# Patient Record
Sex: MALE | Race: BLACK OR AFRICAN AMERICAN | Employment: OTHER | URBAN - METROPOLITAN AREA
[De-identification: names, ages, dates, MRNs, and addresses within clinical notes are randomized per-mention and may not be internally consistent; named-entity substitution may affect disease eponyms.]

---

## 2017-03-01 ENCOUNTER — APPOINTMENT (OUTPATIENT)
Dept: CT IMAGING | Age: 82
DRG: 189 | End: 2017-03-01
Attending: EMERGENCY MEDICINE
Payer: MEDICARE

## 2017-03-01 ENCOUNTER — APPOINTMENT (OUTPATIENT)
Dept: GENERAL RADIOLOGY | Age: 82
DRG: 189 | End: 2017-03-01
Attending: EMERGENCY MEDICINE
Payer: MEDICARE

## 2017-03-01 ENCOUNTER — HOSPITAL ENCOUNTER (INPATIENT)
Age: 82
LOS: 7 days | Discharge: NURSING, REHAB OR LONG-TERM CARE | DRG: 189 | End: 2017-03-08
Attending: EMERGENCY MEDICINE | Admitting: INTERNAL MEDICINE
Payer: MEDICARE

## 2017-03-01 DIAGNOSIS — J96.01 ACUTE RESPIRATORY FAILURE WITH HYPOXIA (HCC): Primary | ICD-10-CM

## 2017-03-01 DIAGNOSIS — J44.9 CHRONIC OBSTRUCTIVE PULMONARY DISEASE, UNSPECIFIED COPD TYPE (HCC): ICD-10-CM

## 2017-03-01 DIAGNOSIS — J44.1 ACUTE EXACERBATION OF CHRONIC OBSTRUCTIVE PULMONARY DISEASE (COPD) (HCC): ICD-10-CM

## 2017-03-01 DIAGNOSIS — C34.12 MALIGNANT NEOPLASM OF UPPER LOBE OF LEFT LUNG (HCC): ICD-10-CM

## 2017-03-01 DIAGNOSIS — J18.9 COMMUNITY ACQUIRED PNEUMONIA: ICD-10-CM

## 2017-03-01 DIAGNOSIS — R06.02 SHORTNESS OF BREATH: ICD-10-CM

## 2017-03-01 PROBLEM — J96.91 RESPIRATORY FAILURE WITH HYPOXIA (HCC): Status: ACTIVE | Noted: 2017-03-01

## 2017-03-01 LAB
ALBUMIN SERPL BCP-MCNC: 3.1 G/DL (ref 3.4–5)
ALBUMIN/GLOB SERPL: 0.7 {RATIO} (ref 0.8–1.7)
ALP SERPL-CCNC: 72 U/L (ref 45–117)
ALT SERPL-CCNC: 62 U/L (ref 16–61)
ANION GAP BLD CALC-SCNC: 6 MMOL/L (ref 3–18)
APTT PPP: 29 SEC (ref 23–36.4)
ARTERIAL PATENCY WRIST A: YES
AST SERPL W P-5'-P-CCNC: 53 U/L (ref 15–37)
BASE EXCESS BLD CALC-SCNC: 11 MMOL/L
BASOPHILS # BLD AUTO: 0 K/UL (ref 0–0.06)
BASOPHILS # BLD: 0 % (ref 0–2)
BDY SITE: ABNORMAL
BILIRUB DIRECT SERPL-MCNC: 0.2 MG/DL (ref 0–0.2)
BILIRUB SERPL-MCNC: 0.6 MG/DL (ref 0.2–1)
BNP SERPL-MCNC: 235 PG/ML (ref 0–1800)
BUN SERPL-MCNC: 14 MG/DL (ref 7–18)
BUN/CREAT SERPL: 19 (ref 12–20)
CALCIUM SERPL-MCNC: 9.3 MG/DL (ref 8.5–10.1)
CHLORIDE SERPL-SCNC: 94 MMOL/L (ref 100–108)
CK MB CFR SERPL CALC: 1.1 % (ref 0–4)
CK MB SERPL-MCNC: 3.1 NG/ML (ref 5–25)
CK SERPL-CCNC: 287 U/L (ref 39–308)
CO2 SERPL-SCNC: 35 MMOL/L (ref 21–32)
CREAT SERPL-MCNC: 0.75 MG/DL (ref 0.6–1.3)
DIFFERENTIAL METHOD BLD: ABNORMAL
EOSINOPHIL # BLD: 0 K/UL (ref 0–0.4)
EOSINOPHIL NFR BLD: 0 % (ref 0–5)
ERYTHROCYTE [DISTWIDTH] IN BLOOD BY AUTOMATED COUNT: 14.1 % (ref 11.6–14.5)
GAS FLOW.O2 O2 DELIVERY SYS: ABNORMAL L/MIN
GLOBULIN SER CALC-MCNC: 4.4 G/DL (ref 2–4)
GLUCOSE SERPL-MCNC: 130 MG/DL (ref 74–99)
HCO3 BLD-SCNC: 35.6 MMOL/L (ref 22–26)
HCT VFR BLD AUTO: 40.4 % (ref 36–48)
HGB BLD-MCNC: 13.2 G/DL (ref 13–16)
INR PPP: 1 (ref 0.8–1.2)
LACTATE BLD-SCNC: 1.1 MMOL/L (ref 0.4–2)
LYMPHOCYTES # BLD AUTO: 5 % (ref 21–52)
LYMPHOCYTES # BLD: 0.6 K/UL (ref 0.9–3.6)
MAGNESIUM SERPL-MCNC: 2.6 MG/DL (ref 1.8–2.4)
MCH RBC QN AUTO: 26.6 PG (ref 24–34)
MCHC RBC AUTO-ENTMCNC: 32.7 G/DL (ref 31–37)
MCV RBC AUTO: 81.5 FL (ref 74–97)
MONOCYTES # BLD: 1.4 K/UL (ref 0.05–1.2)
MONOCYTES NFR BLD AUTO: 11 % (ref 3–10)
NEUTS SEG # BLD: 10.3 K/UL (ref 1.8–8)
NEUTS SEG NFR BLD AUTO: 84 % (ref 40–73)
O2/TOTAL GAS SETTING VFR VENT: 40 %
PCO2 BLD: 54.4 MMHG (ref 35–45)
PEEP RESPIRATORY: 6 CMH2O
PH BLD: 7.42 [PH] (ref 7.35–7.45)
PHOSPHATE SERPL-MCNC: 2.5 MG/DL (ref 2.5–4.9)
PIP ISTAT,IPIP: 13
PLATELET # BLD AUTO: 245 K/UL (ref 135–420)
PMV BLD AUTO: 9.6 FL (ref 9.2–11.8)
PO2 BLD: 76 MMHG (ref 80–100)
POTASSIUM SERPL-SCNC: 3.7 MMOL/L (ref 3.5–5.5)
PROT SERPL-MCNC: 7.5 G/DL (ref 6.4–8.2)
PROTHROMBIN TIME: 12.7 SEC (ref 11.5–15.2)
RBC # BLD AUTO: 4.96 M/UL (ref 4.7–5.5)
SAO2 % BLD: 95 % (ref 92–97)
SERVICE CMNT-IMP: ABNORMAL
SODIUM SERPL-SCNC: 135 MMOL/L (ref 136–145)
SPECIMEN TYPE: ABNORMAL
SPONTANEOUS TIMED, IST: YES
TOTAL RESP. RATE, ITRR: 32
TROPONIN I SERPL-MCNC: <0.02 NG/ML (ref 0–0.04)
WBC # BLD AUTO: 12.3 K/UL (ref 4.6–13.2)

## 2017-03-01 PROCEDURE — 99285 EMERGENCY DEPT VISIT HI MDM: CPT

## 2017-03-01 PROCEDURE — 87186 SC STD MICRODIL/AGAR DIL: CPT | Performed by: EMERGENCY MEDICINE

## 2017-03-01 PROCEDURE — 65660000001 HC RM ICU INTERMED STEPDOWN

## 2017-03-01 PROCEDURE — 71010 XR CHEST PORT: CPT

## 2017-03-01 PROCEDURE — 74011250636 HC RX REV CODE- 250/636: Performed by: PHYSICIAN ASSISTANT

## 2017-03-01 PROCEDURE — 74011250637 HC RX REV CODE- 250/637: Performed by: PHYSICIAN ASSISTANT

## 2017-03-01 PROCEDURE — 85610 PROTHROMBIN TIME: CPT | Performed by: EMERGENCY MEDICINE

## 2017-03-01 PROCEDURE — 80076 HEPATIC FUNCTION PANEL: CPT | Performed by: EMERGENCY MEDICINE

## 2017-03-01 PROCEDURE — 82803 BLOOD GASES ANY COMBINATION: CPT

## 2017-03-01 PROCEDURE — 96365 THER/PROPH/DIAG IV INF INIT: CPT

## 2017-03-01 PROCEDURE — 74011636320 HC RX REV CODE- 636/320: Performed by: EMERGENCY MEDICINE

## 2017-03-01 PROCEDURE — 74011250636 HC RX REV CODE- 250/636: Performed by: EMERGENCY MEDICINE

## 2017-03-01 PROCEDURE — 94660 CPAP INITIATION&MGMT: CPT

## 2017-03-01 PROCEDURE — 83880 ASSAY OF NATRIURETIC PEPTIDE: CPT | Performed by: EMERGENCY MEDICINE

## 2017-03-01 PROCEDURE — 80048 BASIC METABOLIC PNL TOTAL CA: CPT | Performed by: EMERGENCY MEDICINE

## 2017-03-01 PROCEDURE — 74011000250 HC RX REV CODE- 250: Performed by: EMERGENCY MEDICINE

## 2017-03-01 PROCEDURE — 82550 ASSAY OF CK (CPK): CPT | Performed by: EMERGENCY MEDICINE

## 2017-03-01 PROCEDURE — 94762 N-INVAS EAR/PLS OXIMTRY CONT: CPT

## 2017-03-01 PROCEDURE — 87040 BLOOD CULTURE FOR BACTERIA: CPT | Performed by: EMERGENCY MEDICINE

## 2017-03-01 PROCEDURE — 94640 AIRWAY INHALATION TREATMENT: CPT

## 2017-03-01 PROCEDURE — 36600 WITHDRAWAL OF ARTERIAL BLOOD: CPT

## 2017-03-01 PROCEDURE — 71275 CT ANGIOGRAPHY CHEST: CPT

## 2017-03-01 PROCEDURE — 93005 ELECTROCARDIOGRAM TRACING: CPT

## 2017-03-01 PROCEDURE — 85025 COMPLETE CBC W/AUTO DIFF WBC: CPT | Performed by: EMERGENCY MEDICINE

## 2017-03-01 PROCEDURE — 83605 ASSAY OF LACTIC ACID: CPT

## 2017-03-01 PROCEDURE — 74011000258 HC RX REV CODE- 258: Performed by: EMERGENCY MEDICINE

## 2017-03-01 PROCEDURE — 74011250636 HC RX REV CODE- 250/636: Performed by: FAMILY MEDICINE

## 2017-03-01 PROCEDURE — 83735 ASSAY OF MAGNESIUM: CPT | Performed by: EMERGENCY MEDICINE

## 2017-03-01 PROCEDURE — 85730 THROMBOPLASTIN TIME PARTIAL: CPT | Performed by: EMERGENCY MEDICINE

## 2017-03-01 PROCEDURE — 87077 CULTURE AEROBIC IDENTIFY: CPT | Performed by: EMERGENCY MEDICINE

## 2017-03-01 PROCEDURE — 84100 ASSAY OF PHOSPHORUS: CPT | Performed by: EMERGENCY MEDICINE

## 2017-03-01 PROCEDURE — 74011000250 HC RX REV CODE- 250: Performed by: PHYSICIAN ASSISTANT

## 2017-03-01 RX ORDER — MAGNESIUM SULFATE HEPTAHYDRATE 40 MG/ML
2 INJECTION, SOLUTION INTRAVENOUS ONCE
Status: COMPLETED | OUTPATIENT
Start: 2017-03-01 | End: 2017-03-01

## 2017-03-01 RX ORDER — IPRATROPIUM BROMIDE AND ALBUTEROL SULFATE 2.5; .5 MG/3ML; MG/3ML
3 SOLUTION RESPIRATORY (INHALATION) ONCE
Status: COMPLETED | OUTPATIENT
Start: 2017-03-01 | End: 2017-03-01

## 2017-03-01 RX ORDER — SODIUM CHLORIDE 9 MG/ML
100 INJECTION, SOLUTION INTRAVENOUS
Status: COMPLETED | OUTPATIENT
Start: 2017-03-01 | End: 2017-03-01

## 2017-03-01 RX ORDER — IPRATROPIUM BROMIDE AND ALBUTEROL SULFATE 2.5; .5 MG/3ML; MG/3ML
3 SOLUTION RESPIRATORY (INHALATION)
Status: DISCONTINUED | OUTPATIENT
Start: 2017-03-01 | End: 2017-03-08 | Stop reason: HOSPADM

## 2017-03-01 RX ORDER — ASPIRIN 81 MG/1
81 TABLET ORAL DAILY
Status: DISCONTINUED | OUTPATIENT
Start: 2017-03-02 | End: 2017-03-08 | Stop reason: HOSPADM

## 2017-03-01 RX ORDER — ENOXAPARIN SODIUM 100 MG/ML
40 INJECTION SUBCUTANEOUS EVERY 24 HOURS
Status: DISCONTINUED | OUTPATIENT
Start: 2017-03-01 | End: 2017-03-08 | Stop reason: HOSPADM

## 2017-03-01 RX ORDER — ACETAMINOPHEN 325 MG/1
650 TABLET ORAL
Status: DISCONTINUED | OUTPATIENT
Start: 2017-03-01 | End: 2017-03-08 | Stop reason: HOSPADM

## 2017-03-01 RX ORDER — ONDANSETRON 2 MG/ML
4 INJECTION INTRAMUSCULAR; INTRAVENOUS
Status: DISCONTINUED | OUTPATIENT
Start: 2017-03-01 | End: 2017-03-08 | Stop reason: HOSPADM

## 2017-03-01 RX ORDER — SODIUM CHLORIDE 0.9 % (FLUSH) 0.9 %
5-10 SYRINGE (ML) INJECTION EVERY 8 HOURS
Status: DISCONTINUED | OUTPATIENT
Start: 2017-03-01 | End: 2017-03-08 | Stop reason: HOSPADM

## 2017-03-01 RX ORDER — SODIUM CHLORIDE 0.9 % (FLUSH) 0.9 %
5-10 SYRINGE (ML) INJECTION AS NEEDED
Status: DISCONTINUED | OUTPATIENT
Start: 2017-03-01 | End: 2017-03-08 | Stop reason: HOSPADM

## 2017-03-01 RX ORDER — ALBUTEROL SULFATE 0.83 MG/ML
2.5 SOLUTION RESPIRATORY (INHALATION)
Status: DISPENSED | OUTPATIENT
Start: 2017-03-01 | End: 2017-03-01

## 2017-03-01 RX ORDER — LEVOFLOXACIN 5 MG/ML
750 INJECTION, SOLUTION INTRAVENOUS EVERY 24 HOURS
Status: DISCONTINUED | OUTPATIENT
Start: 2017-03-01 | End: 2017-03-04 | Stop reason: CLARIF

## 2017-03-01 RX ADMIN — IOPAMIDOL 71 ML: 755 INJECTION, SOLUTION INTRAVENOUS at 16:35

## 2017-03-01 RX ADMIN — ALBUTEROL SULFATE 2.5 MG: 2.5 SOLUTION RESPIRATORY (INHALATION) at 13:49

## 2017-03-01 RX ADMIN — IPRATROPIUM BROMIDE AND ALBUTEROL SULFATE 3 ML: .5; 3 SOLUTION RESPIRATORY (INHALATION) at 13:56

## 2017-03-01 RX ADMIN — Medication 10 ML: at 21:48

## 2017-03-01 RX ADMIN — ENOXAPARIN SODIUM 40 MG: 40 INJECTION SUBCUTANEOUS at 21:48

## 2017-03-01 RX ADMIN — MAGNESIUM SULFATE HEPTAHYDRATE 2 G: 40 INJECTION, SOLUTION INTRAVENOUS at 13:59

## 2017-03-01 RX ADMIN — IPRATROPIUM BROMIDE AND ALBUTEROL SULFATE 3 ML: .5; 3 SOLUTION RESPIRATORY (INHALATION) at 20:52

## 2017-03-01 RX ADMIN — ACETAMINOPHEN 650 MG: 325 TABLET, FILM COATED ORAL at 22:38

## 2017-03-01 RX ADMIN — METHYLPREDNISOLONE SODIUM SUCCINATE 40 MG: 125 INJECTION, POWDER, FOR SOLUTION INTRAMUSCULAR; INTRAVENOUS at 21:46

## 2017-03-01 RX ADMIN — SODIUM CHLORIDE 100 ML: 900 INJECTION, SOLUTION INTRAVENOUS at 16:35

## 2017-03-01 RX ADMIN — LEVOFLOXACIN 750 MG: 5 INJECTION, SOLUTION INTRAVENOUS at 17:25

## 2017-03-01 NOTE — H&P
[facility]as [facility]man 1946 [facility]  Hospitalist Division      History & Physical    Patient: Tyson Angeles MRN: [id number]  [facility]: [id number]    YOB: 1935  Age: 81 y.o. Sex: male    DOA: 3/1/2017 LOS:  LOS: 0 days        DOA:  3/1/2017  PCP:  Rico Barragan MD    Chief Complaint:  Respiratory distress    Assessment/ Plan:     Patient Active Problem List   Diagnosis Code    Lung mass R91.8    Renal mass N28.89    PAD (peripheral artery disease) (HCC) I73.9    Pulmonary nodules R91.8    CAD (coronary artery disease) I25.10    HTN (hypertension) I10    Hyperlipidemia E78.5    BPH (benign prostatic hypertrophy) N40.0    Respiratory failure with hypoxia (HCC) J96.91       A/P:  - Acute on chronic hypoxic respiratory failure with respiratory distress - Continue BIPAP. CTA Chest pending  - Acute COPD exacerbation - Continue Solumedrol/ Duo-Nebs. - Lovenox for DVT Prophylaxis  - Code status will need re-addressed. He told the ER MD he did not want to be intubated. He told me that he may want intubation if he had no other option. HPI:     Tyson Angeles is a 81 y.o. male with a hx of COPD, Chronic hypoxic respiratory failure on Home O2 3L NC, HTN, BPH, ? Remote history of lung CA who was admitted to [facility] on 3/1/17 for acute hypoxic respiratory failure and COPD exacerbation. He complains of getting short of breath about 1pm this afternoon after walking across a parking lot. He was in distress upon EMS's and was tripoding in the parking lot. He denies any chest pain. He also notes non-productive cough. He declines to answer any other questions for me while on BIPAP. He does not known his home medications and declines to discuss them. He is more comfortable on BIPAP in the ER. He will be admitted to [facility] for further evaluation and treatment.        Past Medical History:   Diagnosis Date    Arthritis     Arthropathy, unspecified, site unspecified     Asthma     BPH (benign prostatic hyperplasia)     COPD (chronic obstructive pulmonary disease) (HCC)     Hypertension     Microscopic hematuria        Past Surgical History:   Procedure Laterality Date    HX AAA REPAIR      HX COLONOSCOPY  2002    HX HERNIA REPAIR  2/6/2004    HX OTHER SURGICAL      Biopsy Prostate    HX OTHER SURGICAL  1/10/2008    HX VASCULAR ACCESS         Family History   Problem Relation Age of Onset    Asthma Father        Social History     Social History    Marital status: LEGALLY      Spouse name: N/A    Number of children: N/A    Years of education: N/A     Social History Main Topics    Smoking status: Former Smoker     Packs/day: 0.30     Years: 55.00     Types: Cigarettes     Quit date: 1/1/2004    Smokeless tobacco: Not on file    Alcohol use No    Drug use: No    Sexual activity: Not Currently     Other Topics Concern    Not on file     Social History Narrative    No narrative on file       No Known Allergies    Review of Systems:  - fever, - chills, - fatigue, - weight loss, - night sweats   - sore throat, - sinus congestion, - lymphadenopathy, - vision changes  - CP, -  palpitations  + shortness of breath. - cough, - hemoptysis  - nausea, - vomiting, - diarrhea, - abdominal pain, - reflux, - dysphagia  - dysuria, - hematuria, - urinary frequency  - rash, - pruritis  - back pain, - neck pain, - myalgia, - arthralgia  - H/A, - numbness, - tingling, - weakness, - slurred speech    Physical Exam:      Visit Vitals    /65    Pulse (!) 126    Temp 98.9 °F (37.2 °C)    Resp 27    SpO2 98%       Physical Exam:  Gen: In general, this is a well nourished male, in no acute distress on BIPAP. HEENT: Sclerae nonicteric. Oral mucous membranes moist.    Neck: Supple with midline trachea. CV: RRR without murmur or rub appreciated. Resp:Respirations are unlabored without use of accessory muscles. Lung fields bilaterally without wheezes or rhonchi. Diminished breath sounds bilaterally. Abd: Soft, nontender, nondistended. Extrem: Extremities are warm, without cyanosis or clubbing. No pitting pretibial edema. Palpable distal pulses X 4.   Skin: Warm, no visible rashes. Neuro: Patient is alert, oriented, and cooperative. No obvious focal defects. Moves all 4 extremities. Labs Reviewed:    Recent Results (from the past 24 hour(s))   EKG, 12 LEAD, INITIAL    Collection Time: 03/01/17  1:40 PM   Result Value Ref Range    Ventricular Rate 140 BPM    Atrial Rate 140 BPM    P-R Interval 116 ms    QRS Duration 78 ms    Q-T Interval 292 ms    QTC Calculation (Bezet) 445 ms    Calculated P Axis 80 degrees    Calculated R Axis -37 degrees    Calculated T Axis 68 degrees    Diagnosis       Sinus tachycardia with premature supraventricular complexes with occasional   premature ventricular complexes  Left axis deviation  Inferior infarct , age undetermined  Abnormal ECG  No previous ECGs available     POC LACTIC ACID    Collection Time: 03/01/17  1:54 PM   Result Value Ref Range    Lactic Acid (POC) 1.1 0.4 - 2.0 mmol/L   CBC WITH AUTOMATED DIFF    Collection Time: 03/01/17  2:00 PM   Result Value Ref Range    WBC 12.3 4.6 - 13.2 K/uL    RBC 4.96 4.70 - 5.50 M/uL    HGB 13.2 13.0 - 16.0 g/dL    HCT 40.4 36.0 - 48.0 %    MCV 81.5 74.0 - 97.0 FL    MCH 26.6 24.0 - 34.0 PG    MCHC 32.7 31.0 - 37.0 g/dL    RDW 14.1 11.6 - 14.5 %    PLATELET 944 634 - 275 K/uL    MPV 9.6 9.2 - 11.8 FL    NEUTROPHILS 84 (H) 40 - 73 %    LYMPHOCYTES 5 (L) 21 - 52 %    MONOCYTES 11 (H) 3 - 10 %    EOSINOPHILS 0 0 - 5 %    BASOPHILS 0 0 - 2 %    ABS. NEUTROPHILS 10.3 (H) 1.8 - 8.0 K/UL    ABS. LYMPHOCYTES 0.6 (L) 0.9 - 3.6 K/UL    ABS. MONOCYTES 1.4 (H) 0.05 - 1.2 K/UL    ABS. EOSINOPHILS 0.0 0.0 - 0.4 K/UL    ABS.  BASOPHILS 0.0 0.0 - 0.06 K/UL    DF AUTOMATED     METABOLIC PANEL, BASIC    Collection Time: 03/01/17  2:00 PM   Result Value Ref Range    Sodium 135 (L) 136 - 145 mmol/L Potassium 3.7 3.5 - 5.5 mmol/L    Chloride 94 (L) 100 - 108 mmol/L    CO2 35 (H) 21 - 32 mmol/L    Anion gap 6 3.0 - 18 mmol/L    Glucose 130 (H) 74 - 99 mg/dL    BUN 14 7.0 - 18 MG/DL    Creatinine 0.75 0.6 - 1.3 MG/DL    BUN/Creatinine ratio 19 12 - 20      GFR est AA >60 >60 ml/min/1.73m2    GFR est non-AA >60 >60 ml/min/1.73m2    Calcium 9.3 8.5 - 10.1 MG/DL   CARDIAC PANEL,(CK, CKMB & TROPONIN)    Collection Time: 03/01/17  2:00 PM   Result Value Ref Range     39 - 308 U/L    CK - MB 3.1 <3.6 ng/ml    CK-MB Index 1.1 0.0 - 4.0 %    Troponin-I, Qt. <0.02 0.0 - 0.045 NG/ML   PTT    Collection Time: 03/01/17  2:00 PM   Result Value Ref Range    aPTT 29.0 23.0 - 36.4 SEC   PROTHROMBIN TIME + INR    Collection Time: 03/01/17  2:00 PM   Result Value Ref Range    Prothrombin time 12.7 11.5 - 15.2 sec    INR 1.0 0.8 - 1.2     PRO-BNP    Collection Time: 03/01/17  2:00 PM   Result Value Ref Range    NT pro- 0 - 1800 PG/ML   CULTURE, BLOOD    Collection Time: 03/01/17  2:00 PM   Result Value Ref Range    Special Requests: PERIPHERAL      Culture result: PENDING    HEPATIC FUNCTION PANEL    Collection Time: 03/01/17  2:00 PM   Result Value Ref Range    Protein, total 7.5 6.4 - 8.2 g/dL    Albumin 3.1 (L) 3.4 - 5.0 g/dL    Globulin 4.4 (H) 2.0 - 4.0 g/dL    A-G Ratio 0.7 (L) 0.8 - 1.7      Bilirubin, total 0.6 0.2 - 1.0 MG/DL    Bilirubin, direct 0.2 0.0 - 0.2 MG/DL    Alk.  phosphatase 72 45 - 117 U/L    AST (SGOT) 53 (H) 15 - 37 U/L    ALT (SGPT) 62 (H) 16 - 61 U/L   MAGNESIUM    Collection Time: 03/01/17  2:00 PM   Result Value Ref Range    Magnesium 2.6 (H) 1.8 - 2.4 mg/dL   PHOSPHORUS    Collection Time: 03/01/17  2:00 PM   Result Value Ref Range    Phosphorus 2.5 2.5 - 4.9 MG/DL   POC G3    Collection Time: 03/01/17  2:22 PM   Result Value Ref Range    Device: BIPAP      FIO2 (POC) 40 %    pH (POC) 7.424 7.35 - 7.45      pCO2 (POC) 54.4 (H) 35.0 - 45.0 MMHG    pO2 (POC) 76 (L) 80 - 100 MMHG    HCO3 (POC) 35.6 (H) 22 - 26 MMOL/L    sO2 (POC) 95 92 - 97 %    Base excess (POC) 11 mmol/L    PEEP/CPAP (POC) 6 cmH2O    PIP (POC) 13      Allens test (POC) YES      Total resp. rate 32      Site RIGHT RADIAL      Specimen type (POC) ARTERIAL      Performed by Demetrice Varner     Spontaneous timed YES         Imaging Reviewed:    CXR  3/1/17  IMPRESSION:  1. Probable COPD with new bilateral lower lobe areas of infiltrate. Differential  diagnosis includes pneumonia, pulmonary edema in the face of severe COPD and  aspiration      Carrie Duong [facility]  Hospitalist Division  Pager:  [phone]  Office:  [phone]

## 2017-03-01 NOTE — IP AVS SNAPSHOT
Current Discharge Medication List  
  
Take these medications at their scheduled times Dose & Instructions Dispensing Information Comments Morning Noon Evening Bedtime ADVAIR DISKUS 500-50 mcg/dose diskus inhaler Generic drug:  fluticasone-salmeterol Your next dose is: Today, Tomorrow Other:  ____________ Dose:  1 Puff Take 1 Puff by inhalation every twelve (12) hours. Refills:  0  
     
   
   
   
  
 * albuterol sulfate 2.5 mg/0.5 mL Nebu nebulizer solution Commonly known as:  PROVENTIL;VENTOLIN Your next dose is: Today, Tomorrow Other:  ____________ Dose:  2.5 mg  
2.5 mg by Nebulization route once. Refills:  0  
     
   
   
   
  
 ASPIRIN LOW DOSE 81 mg tablet Generic drug:  aspirin delayed-release Your next dose is: Today, Tomorrow Other:  ____________ Take  by mouth daily. Refills:  0  
     
   
   
   
  
 AVODART 0.5 mg capsule Generic drug:  dutasteride Your next dose is: Today, Tomorrow Other:  ____________ Dose:  0.5 mg Take 0.5 mg by mouth daily. Refills:  0  
     
   
   
   
  
 cephALEXin 500 mg capsule Commonly known as:  Nickola Newer Your next dose is: Today, Tomorrow Other:  ____________ Dose:  500 mg Take 1 Cap by mouth every six (6) hours. Quantity:  12 Cap Refills:  0 CLARITIN 10 mg tablet Generic drug:  loratadine Your next dose is: Today, Tomorrow Other:  ____________ Dose:  10 mg Take 10 mg by mouth daily. Refills:  0  
     
   
   
   
  
 dilTIAZem 30 mg tablet Commonly known as:  CARDIZEM Your next dose is: Today, Tomorrow Other:  ____________ Dose:  30 mg Take 1 Tab by mouth every six (6) hours. Quantity:  30 Tab Refills:  0  
     
   
   
   
  
 lovastatin 40 mg tablet Commonly known as:  MEVACOR  
   
 Your next dose is: Today, Tomorrow Other:  ____________ Dose:  40 mg Take 40 mg by mouth nightly. Refills:  0  
     
   
   
   
  
 predniSONE 10 mg tablet Commonly known as:  Padmini Reynoso Your next dose is: Today, Tomorrow Other:  ____________ Dose:  10 mg Take 1 Tab by mouth daily (with breakfast). 40 mg po daily x 2 days,then 30 mg po daily x 3 days,then 20 mg po daily x 3 days,then 10 mg po daily Quantity:  30 Tab Refills:  0 SPIRIVA WITH HANDIHALER 18 mcg inhalation capsule Generic drug:  tiotropium Your next dose is: Today, Tomorrow Other:  ____________ Dose:  1 Cap Take 1 Cap by inhalation daily. Refills:  0  
     
   
   
   
  
 therapeutic multivitamin tablet Commonly known as:  [facility] Your next dose is: Today, Tomorrow Other:  ____________ Dose:  1 Tab Take 1 Tab by mouth daily. Refills:  0  
     
   
   
   
  
 * Notice: This list has 1 medication(s) that are the same as other medications prescribed for you. Read the directions carefully, and ask your doctor or other care provider to review them with you. Take these medications as needed Dose & Instructions Dispensing Information Comments Morning Noon Evening Bedtime COMBIVENT  mcg/actuation inhaler Generic drug:  ipratropium-albuterol Your next dose is: Today, Tomorrow Other:  ____________ Take  by inhalation every six (6) hours as needed. Refills:  0  
     
   
   
   
  
 traMADol 50 mg tablet Commonly known as:  ULTRAM  
   
Your next dose is: Today, Tomorrow Other:  ____________ Dose:  50 mg Take 1 Tab by mouth every six (6) hours as needed. Max Daily Amount: 200 mg. Quantity:  30 Tab Refills:  0 Take these medications as directed Dose & Instructions Dispensing Information Comments Morning Noon Evening Bedtime * albuterol 90 mcg/actuation inhaler Commonly known as:  PROVENTIL HFA, VENTOLIN HFA, PROAIR HFA Your next dose is: Today, Tomorrow Other:  ____________ Take  by inhalation. Refills:  0  
     
   
   
   
  
 hydralazine-hydrochlorothiazid 25-25 mg Cap Your next dose is: Today, Tomorrow Other:  ____________ Take  by mouth. Refills:  0  
     
   
   
   
  
 ROBITUSSIN A-C PO Your next dose is: Today, Tomorrow Other:  ____________ Take  by mouth. Refills:  0  
     
   
   
   
  
 XANAX 0.25 mg tablet Generic drug:  ALPRAZolam  
   
Your next dose is: Today, Tomorrow Other:  ____________ Take  by mouth. Refills:  0  
     
   
   
   
  
 * Notice: This list has 1 medication(s) that are the same as other medications prescribed for you. Read the directions carefully, and ask your doctor or other care provider to review them with you. Where to Get Your Medications Information about where to get these medications is not yet available ! Ask your nurse or doctor about these medications  
  cephALEXin 500 mg capsule  
 dilTIAZem 30 mg tablet  
 predniSONE 10 mg tablet  
 traMADol 50 mg tablet

## 2017-03-01 NOTE — ED NOTES
This RN and respiratory therapist accompanied patient via stretcher to CT. Patient returned to ED room without incident.

## 2017-03-01 NOTE — PROGRESS NOTES
Received pt on ambulance cpap, placed on bipap, see flowsheet for settings and alarms. Pt much more comfortable appearing on bipap, appears to be tolerating it well.

## 2017-03-01 NOTE — IP AVS SNAPSHOT
Cesar Doss 
 
 
 [address] [place] [address] [id number] [place] [address] Blvd Patient: Anabella Alegria MRN: [id number] ZIL:0/75/2133 You are allergic to the following No active allergies Recent Documentation Height Weight BMI Smoking Status 1.676 m 67.1 kg 23.88 kg/m2 Former Smoker Emergency Contacts Name Discharge Info Relation Home Work Mobile Skylar Zaragoza  Child [2] [phone] [address] [place] [address] CAREGIVER [3] Child [2]   [phone] About your hospitalization You were admitted on:  March 1, 2017 You last received care in the:  [address] [place] [address],[address] You were discharged on:  March 8, 2017 Unit phone number:  [phone] Why you were hospitalized Your primary diagnosis was:  Respiratory Failure With Hypoxia (Hcc) Your diagnoses also included:  Renal Mass, Pulmonary Nodules, Pad (Peripheral Artery Disease) (Hcc), Lung Mass, Htn (Hypertension), Hyperlipidemia, Bph (Benign Prostatic Hypertrophy), Cad (Coronary Artery Disease), Shortness Of Breath Providers Seen During Your Hospitalizations Provider Role Specialty Primary office phone Candy Cassidy DO Attending Provider Emergency Medicine [phone] Franko Wiseman MD Attending Provider Internal Medicine [phone] Orin Chiang MD Attending Provider Internal Medicine [phone] Your Primary Care Physician (PCP) Primary Care Physician Office Phone Office Fax Janay Nichelle [phone] Follow-up Information Follow up With Details Comments Contact Info JrCandelario Patel MD In 3 days follow up with primary care physician in 3 to 5 days  [facility] 
[address] [place] [address] 
[phone] Current Discharge Medication List  
  
START taking these medications Dose & Instructions Dispensing Information Comments Morning Noon Evening Bedtime  
 cephALEXin 500 mg capsule Commonly known as:  Janes Felix Your next dose is: Today, Tomorrow Other:  _________ Dose:  500 mg Take 1 Cap by mouth every six (6) hours. Quantity:  12 Cap Refills:  0  
     
   
   
   
  
 dilTIAZem 30 mg tablet Commonly known as:  CARDIZEM Your next dose is: Today, Tomorrow Other:  _________ Dose:  30 mg Take 1 Tab by mouth every six (6) hours. Quantity:  30 Tab Refills:  0  
     
   
   
   
  
 traMADol 50 mg tablet Commonly known as:  ULTRAM  
   
Your next dose is: Today, Tomorrow Other:  _________ Dose:  50 mg Take 1 Tab by mouth every six (6) hours as needed. Max Daily Amount: 200 mg. Quantity:  30 Tab Refills:  0 CONTINUE these medications which have CHANGED Dose & Instructions Dispensing Information Comments Morning Noon Evening Bedtime  
 predniSONE 10 mg tablet Commonly known as:  Efren Hawkins What changed:   
- how much to take 
- additional instructions Your next dose is: Today, Tomorrow Other:  _________ Dose:  10 mg Take 1 Tab by mouth daily (with breakfast). 40 mg po daily x 2 days,then 30 mg po daily x 3 days,then 20 mg po daily x 3 days,then 10 mg po daily Quantity:  30 Tab Refills:  0 CONTINUE these medications which have NOT CHANGED Dose & Instructions Dispensing Information Comments Morning Noon Evening Bedtime ADVAIR DISKUS 500-50 mcg/dose diskus inhaler Generic drug:  fluticasone-salmeterol Your next dose is: Today, Tomorrow Other:  _________ Dose:  1 Puff Take 1 Puff by inhalation every twelve (12) hours. Refills:  0  
     
   
   
   
  
 * albuterol sulfate 2.5 mg/0.5 mL Nebu nebulizer solution Commonly known as:  PROVENTIL;VENTOLIN Your next dose is: Today, Tomorrow Other:  _________ Dose:  2.5 mg  
2.5 mg by Nebulization route once. Refills:  0  
     
   
   
   
  
 * albuterol 90 mcg/actuation inhaler Commonly known as:  PROVENTIL HFA, VENTOLIN HFA, PROAIR HFA Your next dose is: Today, Tomorrow Other:  _________ Take  by inhalation. Refills:  0  
     
   
   
   
  
 ASPIRIN LOW DOSE 81 mg tablet Generic drug:  aspirin delayed-release Your next dose is: Today, Tomorrow Other:  _________ Take  by mouth daily. Refills:  0  
     
   
   
   
  
 AVODART 0.5 mg capsule Generic drug:  dutasteride Your next dose is: Today, Tomorrow Other:  _________ Dose:  0.5 mg Take 0.5 mg by mouth daily. Refills:  0 CLARITIN 10 mg tablet Generic drug:  loratadine Your next dose is: Today, Tomorrow Other:  _________ Dose:  10 mg Take 10 mg by mouth daily. Refills:  0  
     
   
   
   
  
 COMBIVENT  mcg/actuation inhaler Generic drug:  ipratropium-albuterol Your next dose is: Today, Tomorrow Other:  _________ Take  by inhalation every six (6) hours as needed. Refills:  0  
     
   
   
   
  
 hydralazine-hydrochlorothiazid 25-25 mg Cap Your next dose is: Today, Tomorrow Other:  _________ Take  by mouth. Refills:  0  
     
   
   
   
  
 lovastatin 40 mg tablet Commonly known as:  MEVACOR Your next dose is: Today, Tomorrow Other:  _________ Dose:  40 mg Take 40 mg by mouth nightly. Refills:  0  
     
   
   
   
  
 ROBITUSSIN A-C PO Your next dose is: Today, Tomorrow Other:  _________ Take  by mouth. Refills:  0 SPIRIVA WITH HANDIHALER 18 mcg inhalation capsule Generic drug:  tiotropium Your next dose is: Today, Tomorrow Other:  _________ Dose:  1 Cap Take 1 Cap by inhalation daily. Refills:  0  
     
   
   
   
  
 therapeutic multivitamin tablet Commonly known as:  [facility] Your next dose is: Today, Tomorrow Other:  _________ Dose:  1 Tab Take 1 Tab by mouth daily. Refills:  0  
     
   
   
   
  
 XANAX 0.25 mg tablet Generic drug:  ALPRAZolam  
   
Your next dose is: Today, Tomorrow Other:  _________ Take  by mouth. Refills:  0  
     
   
   
   
  
 * Notice: This list has 2 medication(s) that are the same as other medications prescribed for you. Read the directions carefully, and ask your doctor or other care provider to review them with you. Where to Get Your Medications Information on where to get these meds will be given to you by the nurse or doctor. ! Ask your nurse or doctor about these medications  
  cephALEXin 500 mg capsule  
 dilTIAZem 30 mg tablet  
 predniSONE 10 mg tablet  
 traMADol 50 mg tablet Discharge Instructions DISCHARGE SUMMARY from Nurse The following personal items are in your possession at time of discharge: 
 
Dental Appliances: None Visual Aid: With patient, Glasses Home Medications: None Jewelry: Watch, With patient Clothing: Sent home Other Valuables: None PATIENT INSTRUCTIONS: 
 
 
F-face looks uneven A-arms unable to move or move unevenly S-speech slurred or non-existent T-time-call 911 as soon as signs and symptoms begin-DO NOT go Back to bed or wait to see if you get better-TIME IS BRAIN. Warning Signs of HEART ATTACK Call 911 if you have these symptoms: ? Chest discomfort. Most heart attacks involve discomfort in the center of the chest that lasts more than a few minutes, or that goes away and comes back. It can feel like uncomfortable pressure, squeezing, fullness, or pain. ? Discomfort in other areas of the upper body. Symptoms can include pain or discomfort in one or both arms, the back, neck, jaw, or stomach. ? Shortness of breath with or without chest discomfort. ? Other signs may include breaking out in a cold sweat, nausea, or lightheadedness. Don't wait more than five minutes to call [address]! Fast action can save your life. Calling 911 is almost always the fastest way to get lifesaving treatment. Emergency Medical Services staff can begin treatment when they arrive  up to an hour sooner than if someone gets to the hospital by car. The discharge information has been reviewed with the patient. The patient verbalized understanding. Discharge medications reviewed with the patient and appropriate educational materials and side effects teaching were provided. Patient armband removed and shredded Discharge Orders None [facility] Announcement We are excited to announce that we are making your provider's discharge notes available to you in [facility]. You will see these notes when they are completed and signed by the physician that discharged you from your recent hospital stay. If you have any questions or concerns about any information you see in [facility], please call the Health Information Department where you were seen or reach out to your Primary Care Provider for more information about your plan of care. Introducing \[facility] [place] [facility]\"" & HEALTH SERVICES! Sven Del Castillo introduces [facility] patient portal. Now you can access parts of your medical record, email your doctor's office, and request medication refills online. 1. In your internet browser, go to [email or web address] 2. Click on the First Time User? Click Here link in the Sign In box. You will see the New Member Sign Up page. 3. Enter your [facility] Access Code exactly as it appears below. You will not need to use this code after youve completed the sign-up process. If you do not sign up before the expiration date, you must request a new code. · [facility] Access Code: [email or web address] Expires: 5/30/2017  1:37 PM 
 
4. Enter the last four digits of your Social Security Number (xxxx) and Date of Birth (mm/dd/yyyy) as indicated and click Submit. You will be taken to the next sign-up page. 5. Create a [facility] ID. This will be your [facility] login ID and cannot be changed, so think of one that is secure and easy to remember. 6. Create a [facility] password. You can change your password at any time. 7. Enter your Password Reset Question and Answer. This can be used at a later time if you forget your password. 8. Enter your e-mail address. You will receive e-mail notification when new information is available in [address] 9. Click Sign Up. You can now view and download portions of your medical record. 10. Click the Download Summary menu link to download a portable copy of your medical information. If you have questions, please visit the Frequently Asked Questions section of the [employer] [facility] website. Remember, [facility] is NOT to be used for urgent needs. For medical emergencies, dial 911. Now available from your iPhone and Android! General Information Please provide this summary of care documentation to your next provider. Patient Signature:  ____________________________________________________________ Date:  ____________________________________________________________  
  
Anuel Police Provider Signature:  ____________________________________________________________ Date:  ____________________________________________________________

## 2017-03-01 NOTE — ED NOTES
Patient medicated per [facility], allergies verified with patient prior to medication administration. Per EMS patient received 125 solumedrol and a duo neb in route. Family at bedside and they state they were on the was here as patient \"wansnt feeling good\" when they called EMS because \"he couldn't breath\". Family states patient has lung cancer that is not treatable and was diagnosed about a year ago. POA at bedside.

## 2017-03-01 NOTE — ED PROVIDER NOTES
HPI Comments: 1:33 PM Albert Pastor is a 81 y.o. male with h/o COPD, asthma, lung cancer, and HTN who presents to ED via EMS complaining of SOB. Patient was found in a parking lot, tripod position, and unable to speak full sentences. Patient had o2 saturation of 94% on nasal cannula 4 liters. EMS report patient was seen at [facility] 5 days ago for a nosebleed. EMS gave 1 combi treatment and Solumedrol 125 mg IV en route to ED. No other concerns or symptoms at this time. PCP: Gregorio Díaz MD    The history is provided by the EMS personnel. Past Medical History:   Diagnosis Date    Arthritis     Arthropathy, unspecified, site unspecified     Asthma     BPH (benign prostatic hyperplasia)     COPD (chronic obstructive pulmonary disease) (HCC)     Hypertension     Microscopic hematuria        Past Surgical History:   Procedure Laterality Date    HX AAA REPAIR      HX COLONOSCOPY  2002    HX HERNIA REPAIR  2/6/2004    HX OTHER SURGICAL      Biopsy Prostate    HX OTHER SURGICAL  1/10/2008    HX VASCULAR ACCESS           Family History:   Problem Relation Age of Onset    Asthma Father        Social History     Social History    Marital status: LEGALLY      Spouse name: N/A    Number of children: N/A    Years of education: N/A     Occupational History    Not on file. Social History Main Topics    Smoking status: Former Smoker     Packs/day: 0.30     Years: 55.00     Types: Cigarettes     Quit date: 1/1/2004    Smokeless tobacco: Not on file    Alcohol use No    Drug use: No    Sexual activity: Not Currently     Other Topics Concern    Not on file     Social History Narrative    No narrative on file         ALLERGIES: Review of patient's allergies indicates no known allergies. Review of Systems   Constitutional: Negative for chills, diaphoresis, fatigue, fever and unexpected weight change.    HENT: Negative for congestion, dental problem, ear discharge, ear pain, hearing loss, nosebleeds, postnasal drip, rhinorrhea, sinus pressure, sore throat, trouble swallowing and voice change. Eyes: Negative for photophobia, pain, discharge, redness and visual disturbance. Respiratory: Positive for shortness of breath. Negative for cough, chest tightness, wheezing and stridor. Cardiovascular: Negative for chest pain, palpitations and leg swelling. Gastrointestinal: Negative for abdominal distention, abdominal pain, anal bleeding, blood in stool, constipation, diarrhea, nausea and vomiting. Genitourinary: Negative for difficulty urinating, dysuria, flank pain, frequency, genital sores, hematuria and urgency. Musculoskeletal: Negative for arthralgias, back pain, myalgias, neck pain and neck stiffness. Skin: Negative for color change, pallor, rash and wound. Allergic/Immunologic: Negative for immunocompromised state. Neurological: Negative for dizziness, tremors, seizures, syncope, weakness, light-headedness, numbness and headaches. Hematological: Negative for adenopathy. Does not bruise/bleed easily. Psychiatric/Behavioral: Negative for agitation, confusion, decreased concentration, hallucinations, sleep disturbance and suicidal ideas. The patient is not nervous/anxious. Vitals:    03/01/17 1830 03/01/17 1900 03/01/17 1930 03/1936   BP: 141/69 133/65 163/72    Pulse: (!) 120 (!) 117 (!) 117 (!) 121   Resp: 21 19 24 28   Temp:       SpO2: 98% 97% 98% 98%            Physical Exam   Constitutional: He is oriented to person, place, and time. He appears well-developed and well-nourished. He appears distressed. Face mask in place. HENT:   Head: Normocephalic and atraumatic. Right Ear: External ear normal.   Left Ear: External ear normal.   Nose: Nose normal.   Mouth/Throat: Oropharynx is clear and moist. No oropharyngeal exudate. Eyes: Conjunctivae and EOM are normal. Pupils are equal, round, and reactive to light.  Right eye exhibits no discharge. Left eye exhibits no discharge. No scleral icterus. Neck: Normal range of motion. Neck supple. JVD present. No tracheal deviation present. No thyromegaly present. Cardiovascular: Regular rhythm, normal heart sounds and intact distal pulses. Tachycardia present. Exam reveals no gallop and no friction rub. No murmur heard. Tachycardia, S1/S2   Pulmonary/Chest: No stridor. He is in respiratory distress. He has no wheezes. He has no rales. He exhibits no tenderness. Tachypnea, diminished breath sounds at bases   Abdominal: Soft. Bowel sounds are normal. He exhibits no distension and no mass. There is no tenderness. There is no rebound and no guarding. Genitourinary: Penis normal.   Musculoskeletal: Normal range of motion. He exhibits no edema or tenderness. Lymphadenopathy:     He has no cervical adenopathy. Neurological: He is alert and oriented to person, place, and time. No cranial nerve deficit. He exhibits normal muscle tone. Coordination normal.   Skin: Skin is warm and dry. No rash noted. He is not diaphoretic. No erythema. No pallor. Psychiatric: He has a normal mood and affect. His behavior is normal. Judgment and thought content normal.   Nursing note and vitals reviewed. MDM  Number of Diagnoses or Management Options  Acute exacerbation of chronic obstructive pulmonary disease (COPD) ([facility] [address]):   Acute respiratory failure with hypoxia [facility]):   Community acquired pneumonia:   Malignant neoplasm of upper lobe of left lung [facility]):   Diagnosis management comments: Differential includes:  Respiratory failure secondary to CHF, COPD, Pneumonia, PE, ACS.     Review of Old medical records revealed that the patient's CT abdomen and pelvis 11/2016 revealed  CT ABD/PELVIS-IV ONLY11/5/2016  [facility]  Result Impression  IMPRESSION:    1.  Multiple dilated loops of small bowel with air-fluid levels.  Not convincing for acute small bowel obstruction.  Favor ileus vs. much less likely evolving partial small bowel obstruction. 2. Small volume ascites. 3. Multiple variable sized hypodense lesions in both kidneys.  Complex cyst in the right kidney upper pole has remained stable. 4.  Left inguinal canal fluid collection.            Amount and/or Complexity of Data Reviewed  Clinical lab tests: reviewed and ordered  Tests in the radiology section of CPT®: reviewed and ordered  Tests in the medicine section of CPT®: ordered and reviewed  Discussion of test results with the performing providers: yes  Decide to obtain previous medical records or to obtain history from someone other than the patient: yes  Obtain history from someone other than the patient: yes  Review and summarize past medical records: yes  Discuss the patient with other providers: yes  Independent visualization of images, tracings, or specimens: yes    Risk of Complications, Morbidity, and/or Mortality  Presenting problems: high  Diagnostic procedures: high  Management options: high    Critical Care  Total time providing critical care: 30-74 minutes    Patient Progress  Patient progress: stable    ED Course       Procedures    Vitals:  Patient Vitals for the past 12 hrs:   Temp Pulse Resp BP SpO2   03/1936 - (!) 121 28 - 98 %   03/01/17 1930 - (!) 117 24 163/72 98 %   03/01/17 1900 - (!) 117 19 133/65 97 %   03/01/17 1830 - (!) 120 21 141/69 98 %   03/01/17 1800 - (!) 121 18 122/67 98 %   03/01/17 1730 - (!) 121 16 (!) 164/119 98 %   03/01/17 1700 - (!) 122 26 150/76 98 %   03/01/17 1600 - (!) 126 24 130/62 98 %   03/01/17 1530 - (!) 126 27 135/65 98 %   03/01/17 1515 - (!) 127 23 142/61 98 %   03/01/17 1500 - (!) 128 25 156/79 96 %   03/01/17 1445 - (!) 129 24 130/63 96 %   03/01/17 1430 - (!) 129 27 (!) 129/116 97 %   03/01/17 1415 - (!) 131 25 132/74 97 %   03/01/17 1345 - (!) 132 21 - 98 %   03/01/17 1344 - - - - 98 %   03/01/17 1338 98.9 °F (37.2 °C) - 22 146/73 99 %   03/01/17 1334 - - - 146/73 99 % Medications ordered:   Medications   sodium chloride (NS) flush 5-10 mL (not administered)   sodium chloride (NS) flush 5-10 mL (not administered)   albuterol (PROVENTIL VENTOLIN) nebulizer solution 2.5 mg (2.5 mg Nebulization Given 3/1/17 1349)   levoFLOXacin (LEVAQUIN) 750 mg in D5W IVPB (750 mg IntraVENous New Bag 3/1/17 1725)   albuterol-ipratropium (DUO-NEB) 2.5 MG-0.5 MG/3 ML (3 mL Nebulization Given 3/1/17 1356)   magnesium sulfate 2 g/50 ml IVPB (premix or compounded) (0 g IntraVENous IV Completed 3/1/17 1459)   iopamidol (ISOVUE-370) 76 % injection 75 mL (71 mL IntraVENous Given 3/1/17 1635)   0.9% sodium chloride infusion 100 mL (100 mL IntraVENous Bolus 3/1/17 1635)         Lab findings:  Recent Results (from the past 12 hour(s))   EKG, 12 LEAD, INITIAL    Collection Time: 03/01/17  1:40 PM   Result Value Ref Range    Ventricular Rate 140 BPM    Atrial Rate 140 BPM    P-R Interval 116 ms    QRS Duration 78 ms    Q-T Interval 292 ms    QTC Calculation (Bezet) 445 ms    Calculated P Axis 80 degrees    Calculated R Axis -37 degrees    Calculated T Axis 68 degrees    Diagnosis       Sinus tachycardia with premature supraventricular complexes with occasional   premature ventricular complexes  Left axis deviation  Inferior infarct , age undetermined  Abnormal ECG  No previous ECGs available     POC LACTIC ACID    Collection Time: 03/01/17  1:54 PM   Result Value Ref Range    Lactic Acid (POC) 1.1 0.4 - 2.0 mmol/L   CBC WITH AUTOMATED DIFF    Collection Time: 03/01/17  2:00 PM   Result Value Ref Range    WBC 12.3 4.6 - 13.2 K/uL    RBC 4.96 4.70 - 5.50 M/uL    HGB 13.2 13.0 - 16.0 g/dL    HCT 40.4 36.0 - 48.0 %    MCV 81.5 74.0 - 97.0 FL    MCH 26.6 24.0 - 34.0 PG    MCHC 32.7 31.0 - 37.0 g/dL    RDW 14.1 11.6 - 14.5 %    PLATELET 481 320 - 595 K/uL    MPV 9.6 9.2 - 11.8 FL    NEUTROPHILS 84 (H) 40 - 73 %    LYMPHOCYTES 5 (L) 21 - 52 %    MONOCYTES 11 (H) 3 - 10 %    EOSINOPHILS 0 0 - 5 %    BASOPHILS 0 0 - 2 %    ABS. NEUTROPHILS 10.3 (H) 1.8 - 8.0 K/UL    ABS. LYMPHOCYTES 0.6 (L) 0.9 - 3.6 K/UL    ABS. MONOCYTES 1.4 (H) 0.05 - 1.2 K/UL    ABS. EOSINOPHILS 0.0 0.0 - 0.4 K/UL    ABS. BASOPHILS 0.0 0.0 - 0.06 K/UL    DF AUTOMATED     METABOLIC PANEL, BASIC    Collection Time: 03/01/17  2:00 PM   Result Value Ref Range    Sodium 135 (L) 136 - 145 mmol/L    Potassium 3.7 3.5 - 5.5 mmol/L    Chloride 94 (L) 100 - 108 mmol/L    CO2 35 (H) 21 - 32 mmol/L    Anion gap 6 3.0 - 18 mmol/L    Glucose 130 (H) 74 - 99 mg/dL    BUN 14 7.0 - 18 MG/DL    Creatinine 0.75 0.6 - 1.3 MG/DL    BUN/Creatinine ratio 19 12 - 20      GFR est AA >60 >60 ml/min/1.73m2    GFR est non-AA >60 >60 ml/min/1.73m2    Calcium 9.3 8.5 - 10.1 MG/DL   CARDIAC PANEL,(CK, CKMB & TROPONIN)    Collection Time: 03/01/17  2:00 PM   Result Value Ref Range     39 - 308 U/L    CK - MB 3.1 <3.6 ng/ml    CK-MB Index 1.1 0.0 - 4.0 %    Troponin-I, Qt. <0.02 0.0 - 0.045 NG/ML   PTT    Collection Time: 03/01/17  2:00 PM   Result Value Ref Range    aPTT 29.0 23.0 - 36.4 SEC   PROTHROMBIN TIME + INR    Collection Time: 03/01/17  2:00 PM   Result Value Ref Range    Prothrombin time 12.7 11.5 - 15.2 sec    INR 1.0 0.8 - 1.2     PRO-BNP    Collection Time: 03/01/17  2:00 PM   Result Value Ref Range    NT pro- 0 - 1800 PG/ML   CULTURE, BLOOD    Collection Time: 03/01/17  2:00 PM   Result Value Ref Range    Special Requests: PERIPHERAL      Culture result: PENDING    HEPATIC FUNCTION PANEL    Collection Time: 03/01/17  2:00 PM   Result Value Ref Range    Protein, total 7.5 6.4 - 8.2 g/dL    Albumin 3.1 (L) 3.4 - 5.0 g/dL    Globulin 4.4 (H) 2.0 - 4.0 g/dL    A-G Ratio 0.7 (L) 0.8 - 1.7      Bilirubin, total 0.6 0.2 - 1.0 MG/DL    Bilirubin, direct 0.2 0.0 - 0.2 MG/DL    Alk.  phosphatase 72 45 - 117 U/L    AST (SGOT) 53 (H) 15 - 37 U/L    ALT (SGPT) 62 (H) 16 - 61 U/L   MAGNESIUM    Collection Time: 03/01/17  2:00 PM   Result Value Ref Range    Magnesium 2.6 (H) 1.8 - 2.4 mg/dL   PHOSPHORUS    Collection Time: 03/01/17  2:00 PM   Result Value Ref Range    Phosphorus 2.5 2.5 - 4.9 MG/DL   POC G3    Collection Time: 03/01/17  2:22 PM   Result Value Ref Range    Device: BIPAP      FIO2 (POC) 40 %    pH (POC) 7.424 7.35 - 7.45      pCO2 (POC) 54.4 (H) 35.0 - 45.0 MMHG    pO2 (POC) 76 (L) 80 - 100 MMHG    HCO3 (POC) 35.6 (H) 22 - 26 MMOL/L    sO2 (POC) 95 92 - 97 %    Base excess (POC) 11 mmol/L    PEEP/CPAP (POC) 6 cmH2O    PIP (POC) 13      Allens test (POC) YES      Total resp. rate 32      Site RIGHT RADIAL      Specimen type (POC) ARTERIAL      Performed by Elisa Sweeney     Spontaneous timed YES         EKG interpretation by ED Physician:   ED EKG interpretation:  Rhythm: sinus tachycardia. Rate (approx.): 140 bpm; Axis: LAD; QRS interval: normal; ST/T wave: no acute ST/T wave changes; Other findings: normal. This EKG was interpreted by Armando Pablo DO,ED Provider. X-Ray, CT or other radiology findings or impressions:  CTA CHEST W WO CONT   Final Result   IMPRESSION:     1. No evidence of pulmonary embolism.     2. Spiculated left upper lobe lung mass with fiducial markers present within it  consistent with lung carcinoma.     3. Bilateral lower lobe patchy infiltrates which are also seen to be new on  chest x-ray done today. Findings are consistent with either pneumonia or  aspiration.     4. COPD with emphysematous changes.     5. Patchy areas of opacity in the inferior left upper lobe and right middle lobe  which could represent scarring or acute areas of infiltrate.     6. Multiple bilateral renal cysts. At least one in the medial upper pole region  of the right kidney is indeterminate. Solid mass in this location cannot be  ruled out.       Interpreted by radiologist 16:59   XR CHEST PORT   Final Result   IMPRESSION:     1. Probable COPD with new bilateral lower lobe areas of infiltrate.  Differential diagnosis includes pneumonia, pulmonary edema in the face of severe COPD and aspiration. Interpreted by radiologist 14:35       Orders:  Orders Placed This Encounter    CULTURE, BLOOD     Standing Status:   Standing     Number of Occurrences:   1    CULTURE, BLOOD     Standing Status:   Standing     Number of Occurrences:   1    XR CHEST PORT     Standing Status:   Standing     Number of Occurrences:   1     Order Specific Question:   Reason for Exam     Answer:   Shortness of breath    CTA CHEST W WO CONT     Standing Status:   Standing     Number of Occurrences:   1     Order Specific Question:   Transport     Answer:   Stretcher [5]     Order Specific Question:   Is Patient Allergic to Contrast Dye? Answer:   No     Order Specific Question:   Does patient have history of Renal Disease? Answer:   No    CBC WITH AUTOMATED DIFF     Standing Status:   Standing     Number of Occurrences:   1    METABOLIC PANEL, BASIC     Standing Status:   Standing     Number of Occurrences:   1    CARDIAC PANEL,(CK, CKMB & TROPONIN)     Standing Status:   Standing     Number of Occurrences:   1    PTT     Standing Status:   Standing     Number of Occurrences:   1    PROTHROMBIN TIME + INR     Standing Status:   Standing     Number of Occurrences:   1    PRO-BNP     Standing Status:   Standing     Number of Occurrences:   1    BLOOD GAS, ARTERIAL     Standing Status:   Standing     Number of Occurrences:   1    HEPATIC FUNCTION PANEL     Standing Status:   Standing     Number of Occurrences:   1    MAGNESIUM     Standing Status:   Standing     Number of Occurrences:   1    PHOSPHORUS     Standing Status:   Standing     Number of Occurrences:   1    DIET NPO     Standing Status:   Standing     Number of Occurrences:   1    CARDIAC MONITORING     Standing Status:   Standing     Number of Occurrences:   1     Order Specific Question:   Type: Answer:   Bedside     Order Specific Question:   Off unit:      Answer:   w/ Tele & Nurse    PULSE OXIMETRY CONTINUOUS Standing Status:   Standing     Number of Occurrences:   1    FULL CODE     Standing Status:   Standing     Number of Occurrences:   1    IP CONSULT TO ONCOLOGY     Standing Status:   Standing     Number of Occurrences:   1     Order Specific Question:   Reason for Consult: Answer:   Left Upper Lobe     Order Specific Question:   Did you call or speak to the consulting provider? Answer:   No     Order Specific Question:   Consult To     Answer:   Dr. Eric Braxton Specific Question:   Schedule When? Answer:   TODAY    POC LACTIC ACID     Standing Status:   Standing     Number of Occurrences:   1    POC G3     Standing Status:   Standing     Number of Occurrences:   1    EKG, 12 LEAD, INITIAL     Standing Status:   Standing     Number of Occurrences:   1     Order Specific Question:   Reason for Exam:     Answer:   chest pain    SALINE LOCK IV ONE TIME Routine     Standing Status:   Standing     Number of Occurrences:   1    BLOOD PRESSURE MONITOR     Standing Status:   Standing     Number of Occurrences:   1    sodium chloride (NS) flush 5-10 mL    sodium chloride (NS) flush 5-10 mL    albuterol-ipratropium (DUO-NEB) 2.5 MG-0.5 MG/3 ML     Order Specific Question:   MODE OF DELIVERY     Answer:   Nebulizer    albuterol (PROVENTIL VENTOLIN) nebulizer solution 2.5 mg     Order Specific Question:   MODE OF DELIVERY     Answer:   Nebulizer    magnesium sulfate 2 g/50 ml IVPB (premix or compounded)    DISCONTD: methylPREDNISolone (PF) (SOLU-MEDROL) injection 125 mg    iopamidol (ISOVUE-370) 76 % injection 75 mL    0.9% sodium chloride infusion 100 mL    levoFLOXacin (LEVAQUIN) 750 mg in D5W IVPB     Order Specific Question:   Antibiotic Indications     Answer:   Pneumonia (CAP)    IP CONSULT TO HOSPITALIST     Standing Status:   Standing     Number of Occurrences:   1     Order Specific Question:   Reason for Consult:      Answer:   COPD / ASTHMA     Comments:   Respiratory failure Order Specific Question:   Did you call or speak to the consulting provider? Answer:   No     Order Specific Question:   Consult To     Answer:   Dr. Krystle Gordon Specific Question:   Schedule When? Answer:   TODAY    INITIAL PHYSICIAN ORDER: INPATIENT Stepdown; 3. Patient receiving treatment that can only be provided in an inpatient setting (further clarification in H&P documentation)     Standing Status:   Standing     Number of Occurrences:   1     Order Specific Question:   Status: Answer:   Inpatient [101]     Order Specific Question:   Type of Bed     Answer:   Stepdown [17]     Order Specific Question:   Inpatient Hospitalization Certified Necessary for the Following Reasons     Answer:   3. Patient receiving treatment that can only be provided in an inpatient setting (further clarification in H&P documentation)     Order Specific Question:   Admitting Diagnosis     Answer:   Respiratory failure with hypoxia [facility]) [[id number]]     Order Specific Question:   Admitting Physician     Answer:   Liliane Dural     Order Specific Question:   Attending Physician     Answer:   Liliane Dural     Order Specific Question:   Estimated Length of Stay     Answer:   > or = to 2 Midnights     Order Specific Question:   Discharge Plan:     Answer:   Home with Office Follow-up       Reevaluation, Progress notes, Consult notes, or additional Procedure notes:   2:37 PM I reassessed the patient and spoke with his family. Patient's grandson states patient quit smoking 20 years ago after smoking 1 ppd. Patient was diagnosed with asbestosis and used to work in the shipyard. Records from 2011 show a lung mass, although no further diagnosis was found. Patient's granddaughter reports patient received radiation and CyberKnife treatment for the mass. Patient also had a renal mass noted in his previous medical records, which patient's granddaughter states nothing was done for.  Patient is tachycardic with heart rate in the 130's. Patient agrees to stay in [facility] for admission. 4:30 PM Consult: I discussed care with Kerry Heart (PA for hospitalist). It was a standard discussion including patient history, chief complaint, available diagnostic results, and predicted treatment course. She agrees to admit. 6:42 PM Consult: I discussed care with Dr. Catracho Miller (oncologist). It was a standard discussion including patient history, chief complaint, available diagnostic results, and predicted treatment course. He states Dr. Bandar Jane (oncologist) will see the patient in consultation tomorrow. Disposition:  Diagnosis:   1. Acute respiratory failure with hypoxia ([address]    2. Acute exacerbation of chronic obstructive pulmonary disease (COPD) ([address]    3. Malignant neoplasm of upper lobe of left lung ([address]    4. Community acquired pneumonia        Disposition: Admit    Follow-up Information     None           Current Discharge Medication List      CONTINUE these medications which have NOT CHANGED    Details   loratadine (CLARITIN) 10 mg tablet Take 10 mg by mouth daily. GUAIFENESIN/CODEINE PHOSPHATE (ROBITUSSIN A-C PO) Take  by mouth.        benzonatate (TESSALON PERLES) 100 mg capsule Take 100 mg by mouth three (3) times daily as needed. predniSONE (DELTASONE) 10 mg tablet Take  by mouth daily (with breakfast). hydralazine-hydrochlorothiazid 25-25 mg cap Take  by mouth. aspirin delayed-release (ASPIRIN LOW DOSE) 81 mg tablet Take  by mouth daily. lovastatin (MEVACOR) 40 mg tablet Take 40 mg by mouth nightly. ALPRAZolam (XANAX) 0.25 mg tablet Take  by mouth. ipratropium-albuterol (COMBIVENT)  mcg/actuation inhaler Take  by inhalation every six (6) hours as needed. therapeutic multivitamin (THERAGRAN) tablet Take 1 Tab by mouth daily. dutaseride (AVODART) 0.5 mg capsule Take 0.5 mg by mouth daily.         !! fluticasone-salmeterol (ADVAIR DISKUS) 500-50 mcg/dose diskus inhaler Take 1 Puff by inhalation every twelve (12) hours. !! fluticasone-salmeterol (ADVAIR) 500-50 mcg/dose diskus inhaler Take 1 Puff by inhalation every twelve (12) hours. albuterol sulfate (PROVENTIL;VENTOLIN) 2.5 mg/0.5 mL Nebu 2.5 mg by Nebulization route once. tiotropium (SPIRIVA WITH HANDIHALER) 18 mcg inhalation capsule Take 1 Cap by inhalation daily. albuterol (PROVENTIL HFA, VENTOLIN HFA) 90 mcg/actuation inhaler Take  by inhalation. !! - Potential duplicate medications found. Please discuss with provider. [address] [place] [address] for and in the presence of Claribel Justice DO (03/01/17)      Physician Attestation  I personally performed the services described in this documentation, reviewed and edited the documentation which was dictated to the scribe in my presence, and it accurately records my words and actions.     Claribel Justice DO (03/01/17)      Signed by: Yg Jung, March 01, 2017 at 7:59 PM

## 2017-03-01 NOTE — ED TRIAGE NOTES
Per arrived via EMS in severe resp distress onCPAP. Per EMS patient was tripoding on hands and knees on O2 @ 2 liters with sats at 93% in resp distress. Pt placed on cpap by EMS and bipap in the ED by resp.

## 2017-03-02 LAB
ANION GAP BLD CALC-SCNC: 10 MMOL/L (ref 3–18)
ARTERIAL PATENCY WRIST A: YES
ATRIAL RATE: 140 BPM
BASE EXCESS BLD CALC-SCNC: 10 MMOL/L
BASOPHILS # BLD AUTO: 0 K/UL (ref 0–0.06)
BASOPHILS # BLD: 0 % (ref 0–2)
BDY SITE: ABNORMAL
BODY TEMPERATURE: 97.7
BUN SERPL-MCNC: 17 MG/DL (ref 7–18)
BUN/CREAT SERPL: 21 (ref 12–20)
CALCIUM SERPL-MCNC: 8.7 MG/DL (ref 8.5–10.1)
CALCULATED P AXIS, ECG09: 80 DEGREES
CALCULATED R AXIS, ECG10: -37 DEGREES
CALCULATED T AXIS, ECG11: 68 DEGREES
CHLORIDE SERPL-SCNC: 94 MMOL/L (ref 100–108)
CO2 SERPL-SCNC: 31 MMOL/L (ref 21–32)
CREAT SERPL-MCNC: 0.81 MG/DL (ref 0.6–1.3)
DIAGNOSIS, 93000: NORMAL
DIFFERENTIAL METHOD BLD: ABNORMAL
EOSINOPHIL # BLD: 0 K/UL (ref 0–0.4)
EOSINOPHIL NFR BLD: 0 % (ref 0–5)
ERYTHROCYTE [DISTWIDTH] IN BLOOD BY AUTOMATED COUNT: 14.1 % (ref 11.6–14.5)
GAS FLOW.O2 O2 DELIVERY SYS: ABNORMAL L/MIN
GLUCOSE SERPL-MCNC: 154 MG/DL (ref 74–99)
HCO3 BLD-SCNC: 34.4 MMOL/L (ref 22–26)
HCT VFR BLD AUTO: 36 % (ref 36–48)
HGB BLD-MCNC: 11.8 G/DL (ref 13–16)
LYMPHOCYTES # BLD AUTO: 5 % (ref 21–52)
LYMPHOCYTES # BLD: 0.7 K/UL (ref 0.9–3.6)
MCH RBC QN AUTO: 26.6 PG (ref 24–34)
MCHC RBC AUTO-ENTMCNC: 32.8 G/DL (ref 31–37)
MCV RBC AUTO: 81.1 FL (ref 74–97)
MONOCYTES # BLD: 0.8 K/UL (ref 0.05–1.2)
MONOCYTES NFR BLD AUTO: 6 % (ref 3–10)
NEUTS SEG # BLD: 11.9 K/UL (ref 1.8–8)
NEUTS SEG NFR BLD AUTO: 89 % (ref 40–73)
O2/TOTAL GAS SETTING VFR VENT: 30 %
P-R INTERVAL, ECG05: 116 MS
PCO2 BLD: 49.7 MMHG (ref 35–45)
PEEP RESPIRATORY: 6 CMH2O
PH BLD: 7.45 [PH] (ref 7.35–7.45)
PIP ISTAT,IPIP: 14
PLATELET # BLD AUTO: 250 K/UL (ref 135–420)
PMV BLD AUTO: 9.7 FL (ref 9.2–11.8)
PO2 BLD: 76 MMHG (ref 80–100)
POTASSIUM SERPL-SCNC: 4.1 MMOL/L (ref 3.5–5.5)
Q-T INTERVAL, ECG07: 292 MS
QRS DURATION, ECG06: 78 MS
QTC CALCULATION (BEZET), ECG08: 445 MS
RBC # BLD AUTO: 4.44 M/UL (ref 4.7–5.5)
SAO2 % BLD: 96 % (ref 92–97)
SERVICE CMNT-IMP: ABNORMAL
SODIUM SERPL-SCNC: 135 MMOL/L (ref 136–145)
SPECIMEN TYPE: ABNORMAL
TOTAL RESP. RATE, ITRR: 24
VENTRICULAR RATE, ECG03: 140 BPM
WBC # BLD AUTO: 13.4 K/UL (ref 4.6–13.2)

## 2017-03-02 PROCEDURE — 74011250636 HC RX REV CODE- 250/636: Performed by: FAMILY MEDICINE

## 2017-03-02 PROCEDURE — 87070 CULTURE OTHR SPECIMN AEROBIC: CPT | Performed by: PHYSICIAN ASSISTANT

## 2017-03-02 PROCEDURE — 77030020263 HC SOL INJ SOD CL0.9% LFCR 1000ML

## 2017-03-02 PROCEDURE — 77030029684 HC NEB SM VOL KT MONA -A

## 2017-03-02 PROCEDURE — 87077 CULTURE AEROBIC IDENTIFY: CPT | Performed by: PHYSICIAN ASSISTANT

## 2017-03-02 PROCEDURE — 74011250636 HC RX REV CODE- 250/636: Performed by: PHYSICIAN ASSISTANT

## 2017-03-02 PROCEDURE — 74011000250 HC RX REV CODE- 250: Performed by: NURSE PRACTITIONER

## 2017-03-02 PROCEDURE — 94640 AIRWAY INHALATION TREATMENT: CPT

## 2017-03-02 PROCEDURE — 87040 BLOOD CULTURE FOR BACTERIA: CPT | Performed by: INTERNAL MEDICINE

## 2017-03-02 PROCEDURE — 85025 COMPLETE CBC W/AUTO DIFF WBC: CPT | Performed by: PHYSICIAN ASSISTANT

## 2017-03-02 PROCEDURE — 87106 FUNGI IDENTIFICATION YEAST: CPT | Performed by: PHYSICIAN ASSISTANT

## 2017-03-02 PROCEDURE — 76450000000

## 2017-03-02 PROCEDURE — 74011250636 HC RX REV CODE- 250/636: Performed by: NURSE PRACTITIONER

## 2017-03-02 PROCEDURE — 74011250636 HC RX REV CODE- 250/636: Performed by: INTERNAL MEDICINE

## 2017-03-02 PROCEDURE — 74011000250 HC RX REV CODE- 250: Performed by: PHYSICIAN ASSISTANT

## 2017-03-02 PROCEDURE — 87186 SC STD MICRODIL/AGAR DIL: CPT | Performed by: PHYSICIAN ASSISTANT

## 2017-03-02 PROCEDURE — 65660000001 HC RM ICU INTERMED STEPDOWN

## 2017-03-02 PROCEDURE — 74011250637 HC RX REV CODE- 250/637: Performed by: PHYSICIAN ASSISTANT

## 2017-03-02 PROCEDURE — 94660 CPAP INITIATION&MGMT: CPT

## 2017-03-02 PROCEDURE — 80048 BASIC METABOLIC PNL TOTAL CA: CPT | Performed by: PHYSICIAN ASSISTANT

## 2017-03-02 PROCEDURE — C9113 INJ PANTOPRAZOLE SODIUM, VIA: HCPCS | Performed by: NURSE PRACTITIONER

## 2017-03-02 PROCEDURE — 36600 WITHDRAWAL OF ARTERIAL BLOOD: CPT

## 2017-03-02 PROCEDURE — 74011000250 HC RX REV CODE- 250: Performed by: INTERNAL MEDICINE

## 2017-03-02 PROCEDURE — 74011250636 HC RX REV CODE- 250/636: Performed by: EMERGENCY MEDICINE

## 2017-03-02 PROCEDURE — 36415 COLL VENOUS BLD VENIPUNCTURE: CPT | Performed by: PHYSICIAN ASSISTANT

## 2017-03-02 PROCEDURE — 82803 BLOOD GASES ANY COMBINATION: CPT

## 2017-03-02 RX ORDER — ARFORMOTEROL TARTRATE 15 UG/2ML
15 SOLUTION RESPIRATORY (INHALATION)
Status: DISCONTINUED | OUTPATIENT
Start: 2017-03-02 | End: 2017-03-02

## 2017-03-02 RX ORDER — SODIUM CHLORIDE 9 MG/ML
75 INJECTION, SOLUTION INTRAVENOUS CONTINUOUS
Status: DISCONTINUED | OUTPATIENT
Start: 2017-03-02 | End: 2017-03-03

## 2017-03-02 RX ORDER — BUDESONIDE 0.5 MG/2ML
500 INHALANT ORAL
Status: DISCONTINUED | OUTPATIENT
Start: 2017-03-02 | End: 2017-03-02

## 2017-03-02 RX ADMIN — Medication 10 ML: at 14:11

## 2017-03-02 RX ADMIN — BUDESONIDE 500 MCG: 0.5 INHALANT RESPIRATORY (INHALATION) at 10:15

## 2017-03-02 RX ADMIN — METHYLPREDNISOLONE SODIUM SUCCINATE 40 MG: 125 INJECTION, POWDER, FOR SOLUTION INTRAMUSCULAR; INTRAVENOUS at 04:18

## 2017-03-02 RX ADMIN — ARFORMOTEROL TARTRATE 15 MCG: 15 SOLUTION RESPIRATORY (INHALATION) at 20:23

## 2017-03-02 RX ADMIN — SODIUM CHLORIDE 750 MG: 900 INJECTION, SOLUTION INTRAVENOUS at 23:37

## 2017-03-02 RX ADMIN — METHYLPREDNISOLONE SODIUM SUCCINATE 40 MG: 125 INJECTION, POWDER, FOR SOLUTION INTRAMUSCULAR; INTRAVENOUS at 09:40

## 2017-03-02 RX ADMIN — METHYLPREDNISOLONE SODIUM SUCCINATE 40 MG: 125 INJECTION, POWDER, FOR SOLUTION INTRAMUSCULAR; INTRAVENOUS at 18:47

## 2017-03-02 RX ADMIN — METHYLPREDNISOLONE SODIUM SUCCINATE 40 MG: 125 INJECTION, POWDER, FOR SOLUTION INTRAMUSCULAR; INTRAVENOUS at 21:54

## 2017-03-02 RX ADMIN — SODIUM CHLORIDE 40 MG: 9 INJECTION INTRAMUSCULAR; INTRAVENOUS; SUBCUTANEOUS at 18:56

## 2017-03-02 RX ADMIN — IPRATROPIUM BROMIDE AND ALBUTEROL SULFATE 3 ML: .5; 3 SOLUTION RESPIRATORY (INHALATION) at 13:43

## 2017-03-02 RX ADMIN — BUDESONIDE 500 MCG: 0.5 INHALANT RESPIRATORY (INHALATION) at 20:23

## 2017-03-02 RX ADMIN — Medication 10 ML: at 22:00

## 2017-03-02 RX ADMIN — SODIUM CHLORIDE 75 ML/HR: 900 INJECTION, SOLUTION INTRAVENOUS at 18:46

## 2017-03-02 RX ADMIN — IPRATROPIUM BROMIDE AND ALBUTEROL SULFATE 3 ML: .5; 3 SOLUTION RESPIRATORY (INHALATION) at 20:23

## 2017-03-02 RX ADMIN — SODIUM CHLORIDE 1500 MG: 900 INJECTION, SOLUTION INTRAVENOUS at 14:00

## 2017-03-02 RX ADMIN — ENOXAPARIN SODIUM 40 MG: 40 INJECTION SUBCUTANEOUS at 20:40

## 2017-03-02 RX ADMIN — ARFORMOTEROL TARTRATE 15 MCG: 15 SOLUTION RESPIRATORY (INHALATION) at 10:15

## 2017-03-02 RX ADMIN — IPRATROPIUM BROMIDE AND ALBUTEROL SULFATE 3 ML: .5; 3 SOLUTION RESPIRATORY (INHALATION) at 07:57

## 2017-03-02 RX ADMIN — ASPIRIN 81 MG: 81 TABLET, COATED ORAL at 09:40

## 2017-03-02 RX ADMIN — LEVOFLOXACIN 750 MG: 5 INJECTION, SOLUTION INTRAVENOUS at 18:56

## 2017-03-02 RX ADMIN — IPRATROPIUM BROMIDE AND ALBUTEROL SULFATE 3 ML: .5; 3 SOLUTION RESPIRATORY (INHALATION) at 01:31

## 2017-03-02 RX ADMIN — Medication 10 ML: at 05:38

## 2017-03-02 NOTE — PROGRESS NOTES
[address] [place], [place] [address]  Hospitalist Division    Daily progress Note    Patient: Chasity Lang MRN: [id number]  CSN: [id number]    YOB: 1935  Age: 81 y.o. Sex: male    DOA: 3/1/2017 LOS:  LOS: 1 day                    Interval Events:     Off of BIPAP for a few hours today. Now back on.   2/4 blood culture bottles growing gram positive cocci    Assessment/Plan:     Chief Complaint:  Shortness of breath    Patient Active Problem List   Diagnosis Code    Lung mass R91.8    Renal mass N28.89    PAD (peripheral artery disease) ([facility]) I73.9    Pulmonary nodules R91.8    CAD (coronary artery disease) I25.10    HTN (hypertension) I10    Hyperlipidemia E78.5    BPH (benign prostatic hypertrophy) N40.0    Respiratory failure with hypoxia ([facility]) J96.91       A/P:  - Acute on chronic hypoxic respiratory failure with respiratory distress - Continue BIPAP. PCCM consulted. Re-check CXR in the am.  - Acute COPD exacerbation - Continue Solumedrol/ Duo-Nebs/ Pulmicort/ Brovana. - Pneumonia - Continue Vanco/ Levaquin. + blood cultures with gram positive cocci. Follow blood cultures. Send sputum culture. - Lung mass with hx of Lung CA - s/p Neuroendocrine Carcinoma. Heme/ Onc following. Plan outpatient follow-up for mass. - He decided again today he wants to be FULL code. He seems a bit indecisive about this. May benefit from Palliative Care consult to help sort out family dynamics/ AMS/ code status      Activity: Bedrest  DVT Prophylaxis: Lovenox  Peptic Ulcer Prophylaxis:  NPO  Care Plan discussed with: RN    Subjective: On BIPAP. Denies any pain. Feels tired. Doesn't want to answer any other questions.      Objective:      Visit Vitals    BP (!) 145/100    Pulse (!) 105    Temp 98.1 °F (36.7 °C)    Resp 19    Ht 5' 6\" (1.676 m)    Wt 67.1 kg (147 lb 14.9 oz)    SpO2 98%    BMI 23.88 kg/m2       Physical Exam:  Gen: In general, this is a well nourished male, in no acute distress on BIPAP. HEENT: Sclerae nonicteric. Oral mucous membranes moist.   Neck: Supple with midline trachea. CV: RRR without murmur or rub appreciated. Resp:Respirations are unlabored without use of accessory muscles. Lung fields bilaterally without wheezes or rhonchi. Diminished breath sounds bilaterally. Abd: Soft, nontender, nondistended. Extrem: Extremities are warm, without cyanosis or clubbing. No pitting pretibial edema. Palpable distal pulses X 4.   Skin: Warm, no visible rashes. Neuro: Patient is alert, oriented, and cooperative. No obvious focal defects. Moves all 4 extremities    Intake and Output:  Current Shift:  03/02 0701 - 03/02 1900  In: 400 [P.O.:400]  Out: 450 [Urine:300]  Last three shifts:  02/28 1901 - 03/02 0700  In: 295 [P.O.:145; I.V.:150]  Out: 610 [Urine:395]    Recent Results (from the past 24 hour(s))   POC G3    Collection Time: 03/01/17  2:22 PM   Result Value Ref Range    Device: BIPAP      FIO2 (POC) 40 %    pH (POC) 7.424 7.35 - 7.45      pCO2 (POC) 54.4 (H) 35.0 - 45.0 MMHG    pO2 (POC) 76 (L) 80 - 100 MMHG    HCO3 (POC) 35.6 (H) 22 - 26 MMOL/L    sO2 (POC) 95 92 - 97 %    Base excess (POC) 11 mmol/L    PEEP/CPAP (POC) 6 cmH2O    PIP (POC) 13      Allens test (POC) YES      Total resp. rate 32      Site RIGHT RADIAL      Specimen type (POC) ARTERIAL      Performed by Jaime Long     Spontaneous timed YES     CBC WITH AUTOMATED DIFF    Collection Time: 03/02/17  3:20 AM   Result Value Ref Range    WBC 13.4 (H) 4.6 - 13.2 K/uL    RBC 4.44 (L) 4.70 - 5.50 M/uL    HGB 11.8 (L) 13.0 - 16.0 g/dL    HCT 36.0 36.0 - 48.0 %    MCV 81.1 74.0 - 97.0 FL    MCH 26.6 24.0 - 34.0 PG    MCHC 32.8 31.0 - 37.0 g/dL    RDW 14.1 11.6 - 14.5 %    PLATELET 945 645 - 753 K/uL    MPV 9.7 9.2 - 11.8 FL    NEUTROPHILS 89 (H) 40 - 73 %    LYMPHOCYTES 5 (L) 21 - 52 %    MONOCYTES 6 3 - 10 %    EOSINOPHILS 0 0 - 5 %    BASOPHILS 0 0 - 2 %    ABS. NEUTROPHILS 11.9 (H) 1.8 - 8.0 K/UL    ABS. LYMPHOCYTES 0.7 (L) 0.9 - 3.6 K/UL    ABS. MONOCYTES 0.8 0.05 - 1.2 K/UL    ABS. EOSINOPHILS 0.0 0.0 - 0.4 K/UL    ABS. BASOPHILS 0.0 0.0 - 0.06 K/UL    DF AUTOMATED     METABOLIC PANEL, BASIC    Collection Time: 03/02/17  3:20 AM   Result Value Ref Range    Sodium 135 (L) 136 - 145 mmol/L    Potassium 4.1 3.5 - 5.5 mmol/L    Chloride 94 (L) 100 - 108 mmol/L    CO2 31 21 - 32 mmol/L    Anion gap 10 3.0 - 18 mmol/L    Glucose 154 (H) 74 - 99 mg/dL    BUN 17 7.0 - 18 MG/DL    Creatinine 0.81 0.6 - 1.3 MG/DL    BUN/Creatinine ratio 21 (H) 12 - 20      GFR est AA >60 >60 ml/min/1.73m2    GFR est non-AA >60 >60 ml/min/1.73m2    Calcium 8.7 8.5 - 10.1 MG/DL   POC G3    Collection Time: 03/02/17  7:59 AM   Result Value Ref Range    Device: BIPAP      FIO2 (POC) 30 %    pH (POC) 7.446 7.35 - 7.45      pCO2 (POC) 49.7 (H) 35.0 - 45.0 MMHG    pO2 (POC) 76 (L) 80 - 100 MMHG    HCO3 (POC) 34.4 (H) 22 - 26 MMOL/L    sO2 (POC) 96 92 - 97 %    Base excess (POC) 10 mmol/L    PEEP/CPAP (POC) 6 cmH2O    PIP (POC) 14      Allens test (POC) YES      Total resp. rate 24      Site RIGHT RADIAL      Patient temp. 97.7      Specimen type (POC) ARTERIAL      Performed by Janneth Lockett        Lab Results   Component Value Date/Time    Glucose 154 03/02/2017 03:20 AM    Glucose 130 03/01/2017 02:00 PM          [facility]  Hospitalist Division  Pager:  [phone]  Office:  [phone]

## 2017-03-02 NOTE — PROGRESS NOTES
[facility]: [phone] ([phone])  [facility]: [phone]   [facility]/[address]: [phone]    Patient Name: Clark Ruiz  YOB: 1935    Date of Initial Consult: 3/2/2017   Reason for Consult: care decisions  Requesting Provider: DEBORAH Aguilar  Primary Care Physician: Oleg Salmeron MD      SUMMARY:   Clark Ruiz is a 81y.o. year old with a past history of  Arthritis, Asthma, BPH, COPD, hypertension, who was admitted on 3/1/2017 at becoming short of breath walking across a parking lot. EMS was called and upon arrival he found in respiratory distress. He was placed on BIPAP in the Er and is currently in the ICU. He reports he feels better with the BIPAP, but still has some dyspnea. Current medical issues leading to Palliative Medicine involvement include: 81year old who in the past had completed an DDNR, but now wishes to rescind the document and is accepting of all life prolonging therapies. Palliative medicine is consulted to clarify goals of care and care decisions. PALLIATIVE DIAGNOSES:   1. Shortness of breath   2. COPD   3. Acute on chronic respiratory failure        PLAN:   1. Patient seen in ICU along with Ms. Geovany Geovany BECERRA. BIPAP is donned, he is alert and oriented x 3, able to participate in conversation comfortably. He tells us he lives alone, is able to handle all of his ADl's, no longer drives. There was no family at bedside. 2. Goals of care although on BIPAP he was able to speak with us. He is accepting of intubation and CPR if needed. He has rescinded his DNR status and is now a full code. He did share he would not long term ventilation, and his daughter in law Pilar Villarreal (Norma) would be able to further direct his medical care. Hopeful, as he improves and once off BiPap we will be able to further discuss his code status and care decisions.    3. Short of breath, reports feeling better on BIPAP and does not feel the treatment is to burdensome. 4. COPD, acute exacerbation, also with pneumonia and + bld c/s.   5. Initial consult note routed to primary continuity provider  6. Communicated plan of care with: Palliative IDT,RN, patient        GOALS OF CARE:     [====Goals of Care====]  GOALS OF CARE:  Patient / health care proxy stated goals: full aggressive care       TREATMENT PREFERENCES:   Code Status: Full Code    Advance Care Planning:  Advance Care Planning 3/1/2017   Patient's Healthcare Decision Maker is: Named in scanned ACP document   Primary Decision Maker Name Neo Palencia   Primary Decision Maker Phone Number [phone]   Primary Decision Maker Relationship to Patient Other relative   Confirm Advance Directive Yes, on file       Other:    The palliative care team has discussed with patient / health care proxy about goals of care / treatment preferences for patient.  [====Goals of Care====]    Advance Care Planning 3/1/2017   Patient's Healthcare Decision Maker is: Named in scanned ACP document   Primary Decision Maker Name Neo Palencia   Primary Decision Maker Phone Number [phone]   Primary Decision Maker Relationship to Patient Other relative   Confirm Advance Directive Yes, on file           HISTORY:     History obtained from: chart     CHIEF COMPLAINT: shortness of breath     HPI/SUBJECTIVE:    The patient is:   [x] Verbal and participatory  [] Non-participatory due to:   Please see summary  CTA of chest 3/1/2017 No evidence of pulmonary embolism.     2. Spiculated left upper lobe lung mass with fiducial markers present within it consistent with lung carcinoma.     3. Bilateral lower lobe patchy infiltrates which are also seen to be new on chest x-ray done today. Findings are consistent with either pneumonia or  aspiration.     4. COPD with emphysematous changes.     5.  Patchy areas of opacity in the inferior left upper lobe and right middle lobe which could represent scarring or acute areas of infiltrate.     6. Multiple bilateral renal cysts. At least one in the medial upper pole region of the right kidney is indeterminate. Solid mass in this location cannot be ruled out. Clinical Pain Assessment (nonverbal scale for nonverbal patients): Pain: 0  Denies pain        FUNCTIONAL ASSESSMENT:     Palliative Performance Scale (PPS): 50%          PSYCHOSOCIAL/SPIRITUAL SCREENING:     Advance Care Planning:  Advance Care Planning 3/1/2017   Patient's Healthcare Decision Maker is: Named in scanned ACP document   Primary Decision Maker Name Mary Weeks   Primary Decision Maker Phone Number [phone]   Primary Decision Maker Relationship to Patient Other relative   Confirm Advance Directive Yes, on file        Any spiritual / Christianity concerns:  [] Yes /  [x] No    Caregiver Burnout:  [] Yes /  [x] No /  [] No Caregiver Present      Anticipatory grief assessment:   [x] Normal  / [] Maladaptive       ESAS Anxiety: Anxiety: 1    ESAS Depression:          REVIEW OF SYSTEMS:     Positive and pertinent negative findings in ROS are noted above in HPI. The following systems were [x] reviewed / [] unable to be reviewed as noted in HPI  Other findings are noted below. Systems: constitutional, ears/nose/mouth/throat, respiratory, gastrointestinal, genitourinary, musculoskeletal, integumentary, neurologic, psychiatric, endocrine. Positive findings noted below. Modified ESAS Completed by: provider   Fatigue: 6 Drowsiness: 0     Pain: 0   Anxiety: 1 Nausea: 0     Dyspnea: 4     Constipation: No     Stool Occurrence(s): 1        PHYSICAL EXAM:     Wt Readings from Last 3 Encounters:   03/01/17 67.1 kg (147 lb 14.9 oz)   07/31/13 73 kg (161 lb)   01/30/13 75.3 kg (166 lb)     Blood pressure 155/69, pulse 100, temperature 98.4 °F (36.9 °C), resp. rate 17, height 5' 6\" (1.676 m), weight 67.1 kg (147 lb 14.9 oz), SpO2 99 %.   Pain:  Pain Scale 1: Numeric (0 - 10)  Pain Intensity 1: 0  Pain Onset 1: gradual with cough  Pain Location 1: Chest, Back  Pain Orientation 1: Right  Pain Description 1: Sharp (pain with coughing only)  Pain Intervention(s) 1: Medication (see MAR)  Last bowel movement:yesterday     Constitutional: 81year old male lying in bed with Bipap donned, awake alert, talkative in NAD  ENMT: BIPAP in place  Respiratory: breathing appears comfortable, but slightly labored   Skin: warm, dry  Neurologic: alert and oriented x 3, able to participate in conversation        HISTORY:     Principal Problem:    Respiratory failure with hypoxia ([facility] [address] (3/1/2017)    Active Problems:    Lung mass (11/27/2012)      Renal mass (11/27/2012)      PAD (peripheral artery disease) ([facility] (11/27/2012)      Pulmonary nodules (11/27/2012)      CAD (coronary artery disease) (11/27/2012)      HTN (hypertension) (11/27/2012)      Hyperlipidemia (11/27/2012)      BPH (benign prostatic hypertrophy) (11/27/2012)      Past Medical History:   Diagnosis Date    Arthritis     Arthropathy, unspecified, site unspecified     Asthma     BPH (benign prostatic hyperplasia)     COPD (chronic obstructive pulmonary disease) ([facility] [facility]     Hypertension     Microscopic hematuria       Past Surgical History:   Procedure Laterality Date    HX AAA REPAIR      HX COLONOSCOPY  2002    HX HERNIA REPAIR  2/6/2004    HX OTHER SURGICAL      Biopsy Prostate    HX OTHER SURGICAL  1/10/2008    HX VASCULAR ACCESS        Family History   Problem Relation Age of Onset    Asthma Father      History reviewed, no pertinent family history.   Social History   Substance Use Topics    Smoking status: Former Smoker     Packs/day: 0.30     Years: 55.00     Types: Cigarettes     Quit date: 1/1/2004    Smokeless tobacco: Not on file    Alcohol use No     No Known Allergies   Current Facility-Administered Medications   Medication Dose Route Frequency    vancomycin (VANCOCIN) 750 mg in 0.9% sodium chloride (MBP/ADV) 250 mL ADV  750 mg IntraVENous Q12H    [START ON 3/4/2017] VANCOMYCIN INFORMATION NOTE   Other ONCE    pantoprazole (PROTONIX) 40 mg in sodium chloride 0.9 % 10 mL injection  40 mg IntraVENous Q24H    0.9% sodium chloride infusion  75 mL/hr IntraVENous CONTINUOUS    sodium chloride (NS) flush 5-10 mL  5-10 mL IntraVENous Q8H    sodium chloride (NS) flush 5-10 mL  5-10 mL IntraVENous PRN    acetaminophen (TYLENOL) tablet 650 mg  650 mg Oral Q4H PRN    ondansetron (ZOFRAN) injection 4 mg  4 mg IntraVENous Q4H PRN    enoxaparin (LOVENOX) injection 40 mg  40 mg SubCUTAneous Q24H    albuterol-ipratropium (DUO-NEB) 2.5 MG-0.5 MG/3 ML  3 mL Nebulization Q6H RT    aspirin delayed-release tablet 81 mg  81 mg Oral DAILY    levoFLOXacin (LEVAQUIN) 750 mg in D5W IVPB  750 mg IntraVENous Q24H    methylPREDNISolone (PF) (SOLU-MEDROL) injection 40 mg  40 mg IntraVENous Q6H        LAB AND IMAGING FINDINGS:     Lab Results   Component Value Date/Time    WBC 14.1 03/03/2017 04:00 AM    HGB 11.8 03/03/2017 04:00 AM    PLATELET 199 46/94/8661 04:00 AM     Lab Results   Component Value Date/Time    Sodium 138 03/03/2017 04:00 AM    Potassium 4.0 03/03/2017 04:00 AM    Chloride 98 03/03/2017 04:00 AM    CO2 29 03/03/2017 04:00 AM    BUN 27 03/03/2017 04:00 AM    Creatinine 0.79 03/03/2017 04:00 AM    Calcium 8.5 03/03/2017 04:00 AM    Magnesium 2.6 03/01/2017 02:00 PM    Phosphorus 2.5 03/01/2017 02:00 PM      Lab Results   Component Value Date/Time    AST (SGOT) 53 03/01/2017 02:00 PM    Alk.  phosphatase 72 03/01/2017 02:00 PM    Protein, total 7.5 03/01/2017 02:00 PM    Albumin 3.1 03/01/2017 02:00 PM    Globulin 4.4 03/01/2017 02:00 PM     Lab Results   Component Value Date/Time    INR 1.0 03/01/2017 02:00 PM    Prothrombin time 12.7 03/01/2017 02:00 PM    aPTT 29.0 03/01/2017 02:00 PM      No results found for: IRON, FE, TIBC, IBCT, PSAT, FERR   No results found for: PH, PCO2, PO2  No components found for: [place]   Lab Results   Component Value Date/Time    CK 287 03/01/2017 02:00 PM    CK - MB 3.1 03/01/2017 02:00 PM              Total time: 50 minutes   Counseling / coordination time: 40 minutes   > 50% counseling / coordination?: yes     Prolonged service was provided for  []30 min   []75 min in face to face time in the presence of the patient. Time Start:   Time End:   Note: this can only be billed with 26834 (initial) or 89827 (follow up). If multiple start / stop times, list each separately.

## 2017-03-02 NOTE — PROGRESS NOTES
[facility] Dosing Services     Pharmacy dosing Vancomycin for treatment of Other/ (+) blood cultures      Started on a regimen of Vancomycin 1500 mg IV x 1 dose for loading dose     Day of Therapy: 0    Other Antibiotics: Levaquin    Labs:   Bun/Serum Creatinine - 17/0.81  CrCl - 64.5 ml/min  WBC - 13.4    Cultures:    3/1/17 Blood x 2: Pending     Plan:  Initiated Vancomycin 750 mg IV Q 12H for goal trough of 15-20. Ordered trough level on 3/4/17. Pharmacy to follow and will make changes to dose and/or frequency based on clinical status. Thank you for the consult,   Ruiz Hugo, RishabhD, M.S.   [address] [id number]   21

## 2017-03-02 NOTE — PROGRESS NOTES
Pt states he wants to recind his DNR and AMD. He told his MD he wants to be a full code. He told me that he wants Xiao Donald [phone] to answer questions verify info. Bedside nurse states he said Xiao Donald is his POA. Will attempt to call her.

## 2017-03-02 NOTE — PROGRESS NOTES
1946-arrived to ICU, pt on NC, insisting on standing to void, pt is very short of breath with minimal exertion, pt willing to have partial CHG bath, declines application of mepilex and SCDs. Pt oriented to room, call bell in reach   2003-BIPAP placed for respiratory distress   2017-Sherlyn Thorntonan [phone] at bedside   2036-Dr Blair pagealexa for BIPAP orders, new orders recieved  2238-tylenol 650mg po given for c/o pain  2315-pt resting quietly  0200-assessment unchanged  0300-AM labs drawn  0400-pt declined AM care  0700  Bedside and Verbal shift change report given to KYLAH Viveros (oncoming nurse) by Janna Lucas RN   (offgoing nurse). Report included the following information MAR, Accordion, Recent Results, Med Rec Status, Cardiac Rhythm SR/ST and Alarm Parameters .

## 2017-03-02 NOTE — PROGRESS NOTES
9727  Assumed care of pt. Currently on BIPAP, resting. 0800  Switched to nasal cannula. Awake, alert, DAWN's. Got OOB without supervision, reminded to ring call light when he desires to get OOB to avoid falling. 0900  Stew ice chips and water po without evidence of aspiration. 1355 OOB to [facility] for BM, dyspneic afterwards with prolonged recovery. Placed back on BIPAP. Recovered eupneic state soon after BIPAP resumed.   1415  sleeping

## 2017-03-02 NOTE — ED NOTES
Patient refused to remove his home clothing that he is wearing under the hospital gown. Patient requesting to be able to wear his clothing on the floor. Patient advised that may not be possible.

## 2017-03-02 NOTE — PROGRESS NOTES
0845  Pt taken off Bipap and placed on 3 lpm nasal cannula. Sats-98%, HR-100, RR-22. Respiratory will continue to monitor.

## 2017-03-02 NOTE — DIABETES MGMT
NUTRITIONAL ASSESSMENT AND GLYCEMIC CONTROL PLAN OF Jemma Lowe           82 y.o.           3/1/2017                 1. Acute respiratory failure with hypoxia ([facility]    2. Acute exacerbation of chronic obstructive pulmonary disease (COPD) ([facility] [address]    3. Malignant neoplasm of upper lobe of left lung ([facility] [address]    4. Community acquired pneumonia       []  No Cultural, Hindu or ethnic dietary need identified. []  Cultural, Hindu and ethnic food preferences identified and addressed    [x]  Participated in discharge planning/Interdisciplinary rounds   Food allergies: [x]  No        []  Yes-  ASSESSMENT:    Pt screened for nutritional status. He is within his ideal wt range as evidenced by 104% ideal weight and BMI=23.1kg/m2. Pt was well nourished prior to admission. Inadequate protein-energy intake related to respiratory distress AEB inability to take in oral nourishment while on bipap. INTERVENTIONS/PLAN:   Encourage increased intake at meals to meet calorie and protein requirements when diet resumed. SUBJECTIVE/OBJECTIVE:   Information obtained from:ICU rounds  Diet: NPO  No data found. NS at 75ml/hr.   Medications: [x]                Reviewed     Most Recent POC Glucose:   Recent Labs      03/02/17   0320  03/01/17   1400   GLU  154*  130*         Labs:   No results found for: HBA1C, HGBE8, UBZ1OSEN  Lab Results   Component Value Date/Time    Sodium 135 03/02/2017 03:20 AM    Potassium 4.1 03/02/2017 03:20 AM    Chloride 94 03/02/2017 03:20 AM    CO2 31 03/02/2017 03:20 AM    Anion gap 10 03/02/2017 03:20 AM    Glucose 154 03/02/2017 03:20 AM    BUN 17 03/02/2017 03:20 AM    Creatinine 0.81 03/02/2017 03:20 AM    Calcium 8.7 03/02/2017 03:20 AM    Magnesium 2.6 03/01/2017 02:00 PM    Phosphorus 2.5 03/01/2017 02:00 PM    Albumin 3.1 03/01/2017 02:00 PM     Anthropometrics: IBW : 64.4 kg (142 lb), % IBW (Calculated): 104.18 %, BMI (calculated): 23.9  Wt Readings from Last 1 Encounters: 03/01/17 67.1 kg (147 lb 14.9 oz)      Ht Readings from Last 1 Encounters:   03/01/17 5' 6\" (1.676 m)       Estimated Nutrition Needs: 2010 Kcals/day, Protein (g): 80 g   Fluid reqrts 2 liters/d  Based on:   [x]          Actual BW    []          IBW   []            Adjusted BW      Nutrition Diagnoses:      As stated above. Nutrition Interventions:  Goal:     Intake will meet >75% of energy and protein requirements by 3/7. Wt maintenance by 3/12.      Nutrition Monitoring and Evaluation      [x]     Monitor po intake on meal rounds when diet resumes  [x]     Continue inpatient monitoring and intervention  []     Other:      Nutrition Risk:  [x]   High     []  Moderate    []  Minimal/Uncompromised    Josiah Parsons RD

## 2017-03-02 NOTE — PROGRESS NOTES
Patient currently on Bipap and did not attempt interview. Case-management will follow-up tomorrow for completion of CM interview and clarification of legal NOK/POA and to establish DC plan .  Severiano Economy RN

## 2017-03-02 NOTE — PROGRESS NOTES
conducted an initial consultation and Spiritual Assessment for Brendon, who is a 81 y.o.,male. Patients Primary Language is: [place]. According to the patients EMR Jew Affiliation is: Shelby Cheema.     The reason the Patient came to the hospital is:   Patient Active Problem List    Diagnosis Date Noted    Respiratory failure with hypoxia ([facility] [address] 03/01/2017    Lung mass 11/27/2012    Renal mass 11/27/2012    [facility] 11/27/2012    Pulmonary nodules 11/27/2012    CAD (coronary artery disease) 11/27/2012    HTN (hypertension) 11/27/2012    Hyperlipidemia 11/27/2012    BPH (benign prostatic hypertrophy) 11/27/2012        The  provided the following Interventions:  Initiated a relationship of care and support with patient in room 2701 around 1100 this morning. Patient did not have a lot to say and simply responded with answers to questions asked. Provided information about Spiritual Care Services. Offered prayer and assurance of continued prayers on patients behalf. The following outcomes were achieved:  Patient shared limited information about his medical narrative and spiritual journey/beliefs. Patient processed feeling about current hospitalization. Patient expressed gratitude for pastoral care visit. Assessment:  Patient does not have any Gnosticism/cultural needs that will affect patients preferences in health care. There are no further spiritual or Gnosticism issues which require Spiritual Care Services interventions at this time. Plan:  Chaplains will continue to follow and will provide pastoral care on an as needed/requested basis    . Franci Fall   Spiritual Care   [phone]

## 2017-03-02 NOTE — PROGRESS NOTES
-1355-Pt. Placed back on Bipap13/6 FIo2-30% due to increased WOB. Pt. Received scheduled nebulizer treatment prior. O2 Sats-98% HR-116, RR-20. Respiratory will continue to monitor. -[facility]    -0536-Pt. Remains on Bipap 13/6, FIo2-30%. Resting comfortably.  O2 Sats-97% HR-99, RR-22.-[facility]

## 2017-03-02 NOTE — CDMP QUERY
Dr. Sarthak Mcallister in reference to your note added to H&P,     \"Pt is breathing better, still on BIPAP  CTA negative for PE, + pneumonia and lung mass  Pt has of lung CA   Cont steroids, HHN and abx\"    This was not mentioned elsewhere, could you please  clarify if this patient is being treated/managed for:    =>Pneumonia  =>Other Explanation of clinical findings  =>Unable to Determine (no explanation of clinical findings)    Please clarify and document your clinical opinion in the progress notes and discharge summary.      Thank you,    Pao Francois RN, [phone]  [place] [phone]

## 2017-03-02 NOTE — CONSULTS
Hematology/Oncology Consultation Note  Jae Bone  2701/01    Assessment  81 y.o. M with neuroendocrine carcinoma    Plan  Neuroendocrine carcinoma  S/p 55Gy CyberKnife 8-9/2013  Not sure this is larger than prior [facility] scan 8/2016, when it measured 2.9 x 1.4 x 1.6 cm.   - Please ask Radiology to compare the size of the mass to other scans from [facility]. - Advised follow up with Dr. Robert Clark at [facility] where he had Cyberknife in the past, but he is not really keen on that  - Can follow up with me as outpatient at [facility], [facility], [phone]    Respiratory failure  - Bipap    CC  I was asked by Dr. Reggie Gross to evaluate [address] for lung mass. He  is a 81 y.o. male admitted on 3/1/2017 for Respiratory failure with hypoxia ([address] History  4/10/08 CT chest  Emphysema.  No definite fibrosis.  Peribronchial cuffing that could be related to chronic infection. Bilateral posterior pleural plaques.  Question has there been significant asbestos exposure. Likely renal cysts. Tiny right lobe nodule, not clearly calcified.  Recommend follow up per Fleischner Society recommendations. 7/18/10 CT chest  1.  No evidence of pulmonary embolism. 2.  Multiple bilateral renal cysts.  Multiple bilateral subcentimeter renal hypodensities, too small characterize, but likely cysts. 3.  Two pulmonary nodules at the left lung, largest measuring 5 mm.  Recommendation for management of the lung nodules should be based on most recent Fleischner Society guideline:  1/14/111 CTA chest  1.  No evidence of pulmonary embolism. 2.  New paramediastinal right upper lobe mass causing attenuation of a right upper lobe pulmonary arterial segmental branch.  While this could be infectious/inflammatory.  This is most likely a primary lung neoplasm.  Tissue sampling is recommended.  No adenopathy. 3.  New increased parenchymal density in the medial right apex which could be a region of mild atelectasis.  Recommend attention on follow-up for change. 4.  New trace bilateral pleural effusions.  Stable posterior bilateral pleural thickening. 5.  Decreased left apical lung nodule.  More spiculated irregular left upper lobe density is stable. 6.  Multiple renal cysts and additional low-density foci.  There are two small right renal lesions that do not meet full criteria for simple cyst and solid mass cannot be excluded, limited by small size and motion.  Ultrasound might be useful in assessment of these two foci that border the dominant upper pole cyst.  2/24/12 CT chest  1.  Stable bilateral pulmonary nodules dating back to 5/27/11.  The largest measures 4 mm.  The should be followed according to the bullet that lines. 2.  Advanced emphysematous changes. 3.  Stable bilateral upper lobe scarring. 4. Renal cysts. 6/5/13 CT chest/abd  New irregular soft tissue density mass 1.5 cm in the left upper lobe or dramatic interval increase in size of a barely visible tiny nodule consistent with malignancy likely primary lung carcinoma.  Tissue diagnosis is recommended.       Constellation of findings which are perplexing without the benefit of specific history with prior right upper lobe mass and scattered pulmonary nodules followed by resolution of the mass with replacement by a groundglass infiltrate and decrease in size of the pulmonary nodules.  Does this patient have a history of previous malignancy within the right upper lobe with radiation treatment or chemotherapy?       There are a few tiny scattered pulmonary nodules which predated the suspicious right upper lobe mass with additional scattered pulmonary nodules which had been suspicious for metastatic disease.  These few pulmonary nodules are stable back to 2010 and therefore consistent with benign nodules.       Groundglass nodule within the left apex since 7/18/10.  New Fleishner Society Guidelines for ground glass nodules-    Solitary pure ground glass nodules larger than 5 mm require  yearly surveillance Ct exams for a minimum of 3 years       Stable bilateral micronodular adrenal thickening consistent with benign etiology. Stable bilateral renal simple cysts.  Stable septated right renal upper lobe small cyst.       8/5/13  LUNG, LEFT UPPER LOBE, CT GUIDED CORE BIOPSY WITH TOUCH  PREPARATIONS AND CELL BLOCK:    - POSITIVE FOR MALIGNANCY.   - Carcinoma with neuroendocrine features, see NOTE. NOTE:  This is a poorly differentiated carcinoma with large cell size,  abundant cytoplasm and tumor necrosis.  By immunostain with  appropriate controls, tumor cells are positive for CK7, TTF-1,  CD56, focal and weakly positive for chromogranin/synaptophysin,  and negative for NapsinA,  p63, and CK5/6.  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 Dr. Juan Nieves assistant was informed of the diagnosis on 8/7/13. THIS CASE HAS BEEN REVIEWED BY AN ADDITIONAL MEMBER OF THIS  DEPARTMENT ([address]) WHO CONCURS. Biopsy and fiducial placement, complicated by pneumothorax    8/12/13 PET  1.  Abnormal FDG uptake within a spiculated 1.1 x 1.5 cm left upper lobe pulmonary nodule consistent with underlying neoplasm.       2.  No other foci of malignant FDG uptake. 7/31/15 CT chest  Impression:   1. New pulmonary nodules seen bilaterally: 9 mm left apical and 7 mm right lung base. Given patient's history, these are suspicious for possible new pulmonary malignancies area recommend short interval followup with repeat chest CT in 3 months.       2. 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 Increased on the left in the region of fiducial markers, possibly post radiation related.       3. Multiple renal cysts again noted. Mildly complex 1.3 cm right upper pole cyst is unchanged compared to the 2013, but increased compared to 2011 where it measured 1.1 cm. Not FDG avid on PET/CT.  Can further evaluate with dedicated enhanced abdominal CT or MRI (renal mass protocol) to determine enhancing characteristics, as clinically warranted. 8/20/16 CT CAP  1. The left upper lobe lesion adjacent the fiducials appears mildly more conspicuous. May consider correlation with PET-CT. In the region of the fiducials seen on prior CT, there is a soft tissue density measuring 2.9 x 1.4 x 1.6 cm. This appears increased in size since prior imaging. In the left apex best seen on image 5 of series 3, there is a 1 x 0.7 cm right nodule which appears similar to mildly decreased since prior imaging       2. The left apical pulmonary nodule is similar to mildly decreased since the prior exam.        3. A right lower lobe pulmonary nodule is no longer appreciated.        4. Dilated small bowel and colon. No obstructing process or transition point. This may represent ileus. Close follow up is recommended.        5. Severe right hip osteoarthritis.        6. Multiple unchanged renal cysts.        7. Left inguinal fat hernia with a small amount of nonspecific fluid within it. 11/5/16 CT AP      1.  Multiple dilated loops of small bowel with air-fluid levels.  Not convincing for acute small bowel obstruction.  Favor ileus vs. much less likely evolving partial small bowel obstruction.       2. Small volume ascites.       3. Multiple variable sized hypodense lesions in both kidneys.  Complex cyst in the right kidney upper pole has remained stable.       4.  Left inguinal canal fluid collection.         11/11/16 CgA 3  3/1/17 CT chest  1. No evidence of pulmonary embolism.     2. Spiculated left upper lobe lung mass with fiducial markers present within it  consistent with lung carcinoma.     3. Bilateral lower lobe patchy infiltrates which are also seen to be new on  chest x-ray done today. Findings are consistent with either pneumonia or  aspiration.     4. COPD with emphysematous changes.     5.  Patchy areas of opacity in the inferior left upper lobe and right middle lobe  which could represent scarring or acute areas of infiltrate.     6. Multiple bilateral renal cysts. At least one in the medial upper pole region  of the right kidney is indeterminate. Solid mass in this location cannot be  ruled out.         Developed sudden onset dyspnea while walking across parking lot  Chronic home O2 3L  Called EMS  [facility]  Admitted to stepdown     Breathing is \"much better\" than yesterday  On BIPAP  Continues to wheeze  Denies pain  Has not had followup for his lung cancer  Reviewed CT and uncertainly about size comparison    No Known Allergies    Prior to Admission medications    Medication Sig Start Date End Date Taking? Authorizing Provider   loratadine (CLARITIN) 10 mg tablet Take 10 mg by mouth daily. Historical Provider   GUAIFENESIN/CODEINE PHOSPHATE (ROBITUSSIN A-C PO) Take  by mouth. Yes Historical Provider   benzonatate (TESSALON PERLES) 100 mg capsule Take 100 mg by mouth three (3) times daily as needed. Yes Historical Provider   predniSONE (DELTASONE) 10 mg tablet Take  by mouth daily (with breakfast). Historical Provider   hydralazine-hydrochlorothiazid 25-25 mg cap Take  by mouth. Historical Provider   aspirin delayed-release (ASPIRIN LOW DOSE) 81 mg tablet Take  by mouth daily. Yes Historical Provider   lovastatin (MEVACOR) 40 mg tablet Take 40 mg by mouth nightly. Historical Provider   ALPRAZolam Alverta Joyce) 0.25 mg tablet Take  by mouth. Yes Historical Provider   ipratropium-albuterol (COMBIVENT)  mcg/actuation inhaler Take  by inhalation every six (6) hours as needed. Historical Provider   therapeutic multivitamin (THERAGRAN) tablet Take 1 Tab by mouth daily. Historical Provider   dutaseride (AVODART) 0.5 mg capsule Take 0.5 mg by mouth daily. Yes Historical Provider   fluticasone-salmeterol (ADVAIR DISKUS) 500-50 mcg/dose diskus inhaler Take 1 Puff by inhalation every twelve (12) hours.      Yes Historical Provider   fluticasone-salmeterol (ADVAIR) 500-50 mcg/dose diskus inhaler Take 1 Puff by inhalation every twelve (12) hours. Historical Provider   albuterol sulfate (PROVENTIL;VENTOLIN) 2.5 mg/0.5 mL Nebu 2.5 mg by Nebulization route once. Yes Historical Provider   tiotropium (SPIRIVA WITH HANDIHALER) 18 mcg inhalation capsule Take 1 Cap by inhalation daily. Historical Provider   albuterol (PROVENTIL HFA, VENTOLIN HFA) 90 mcg/actuation inhaler Take  by inhalation.      Yes Historical Provider       Current Facility-Administered Medications   Medication Dose Route Frequency    sodium chloride (NS) flush 5-10 mL  5-10 mL IntraVENous Q8H    sodium chloride (NS) flush 5-10 mL  5-10 mL IntraVENous PRN    acetaminophen (TYLENOL) tablet 650 mg  650 mg Oral Q4H PRN    ondansetron (ZOFRAN) injection 4 mg  4 mg IntraVENous Q4H PRN    enoxaparin (LOVENOX) injection 40 mg  40 mg SubCUTAneous Q24H    albuterol-ipratropium (DUO-NEB) 2.5 MG-0.5 MG/3 ML  3 mL Nebulization Q6H RT    aspirin delayed-release tablet 81 mg  81 mg Oral DAILY    levoFLOXacin (LEVAQUIN) 750 mg in D5W IVPB  750 mg IntraVENous Q24H    methylPREDNISolone (PF) (SOLU-MEDROL) injection 40 mg  40 mg IntraVENous Q6H       Past Medical History:   Diagnosis Date    Arthritis     Arthropathy, unspecified, site unspecified     Asthma     BPH (benign prostatic hyperplasia)     COPD (chronic obstructive pulmonary disease) (HCC)     Hypertension     Microscopic hematuria      Past Surgical History:   Procedure Laterality Date    HX AAA REPAIR      HX COLONOSCOPY  2002    HX HERNIA REPAIR  2/6/2004    HX OTHER SURGICAL      Biopsy Prostate    HX OTHER SURGICAL  1/10/2008    HX VASCULAR ACCESS       Family History   Problem Relation Age of Onset    Asthma Father      Social History     Social History    Marital status: LEGALLY      Spouse name: N/A    Number of children: N/A    Years of education: N/A     Social History Main Topics    Smoking status: Former Smoker     Packs/day: 0.30     Years: 55.00     Types: Cigarettes     Quit date: 2004    Smokeless tobacco: Not on file    Alcohol use No    Drug use: No    Sexual activity: Not Currently     Other Topics Concern    Not on file     Social History Narrative    No narrative on file     Lives alone  Doesn't drive due to \"bum knee\"  Retired from [employer]  Has asbestos exposure    8 kids, 6 in the area    ROS  GEN: No malaise, fevers, night sweats, weight changes  NEURO: No headaches, no weakness or difficulty walking  HEENT: No visual changes, pharyngitis, dysphagia  CV: No chest pain, palpitations or claudication. No orthopnea/PND. PULM: No SOB, cough, hemoptysis. GI: No nausea, vomiting, diarrhea, constipation, melena or hematochezia. : Passing urine well, no dysuria  ENDO: No polyuria or polydipsia, no heat/cold intolerance  MUSC/SKEL: No joint swellings, no arthralgias, no myalgias  PSYCH: No depression or anxiety, no difficulty sleeping    Exam  Temp (24hrs), Av °F (37.2 °C), Min:97.7 °F (36.5 °C), Max:100.4 °F (38 °C)      GEN: Well-nourished, in no acute distress, on BIPAP  NEURO:  Alert, oriented x3, non-focal  HENT: Posterior oropharynx benign without exudates/lesions  EYES: PERRL, sclerae anicteric  NECK:  Supple without masses or adenopathy. CV:  Regular rate and rhythm without murmurs. No BUE or  BLE edema  PULM:  Tight expiratory wheeze bilaterally, faint voice  ABD:  Bowel sounds positive. Soft, non-distended, non-tender, and without palpable masses or organomegaly  EXT:  No cyanosis or clubbing  SKIN:  No rashes or petechiae      Labs  Recent Labs      17   0320   WBC  13.4*   HCT  36.0   MCV  81.1     Recent Labs      17   0320  17   1400   NA  135*  135*   CO2  31  35*   BUN  17  14   INR   --   1.0     Recent Labs      17   1400   AGRAT  0.7*     No results for input(s): UGLU in the last 72 hours.     No lab exists for component: Josette Paniagua, BLAYNE, [facility], [facility], [facility], [facility], U[facility] Ann ROBERTS  @[email or web address][[email or web address]:8@    No lab exists for component: [address] [place] [address] MD  [facility] [phone]  Cell [phone]

## 2017-03-02 NOTE — PALLIATIVE CARE
Thom Fall, NP and I met with pt who was on bipap but could communicate. Pt confirmed that he is willing to be intubated and so remains FULL CODE.

## 2017-03-02 NOTE — CONSULTS
[facility]  Pulmonary, Critical Care, and Sleep Medicine    Name: Mary Search MRN: [id number]   : 1935 Hospital: [address] [place] [address]   Date: 3/2/2017        Critical Care Initial Patient Consult    IMPRESSION:   · Acute on chronic hypoxemic resp failure likely multifactorial: infection/PNA, COPD exacerbation and possible mets w/hx CA of lung and other causes. On BIPAP  · COPD exacerbation  · Hx Lung Cancer/lung mass  · Hx CAD-on Aspirin, Stress Echo 3/25-no ischemia, Nuclear stress test (3/30/2007) low probability of ischemia. · Hx HTN, BPH, OA, PAD. PLAN:     RS: Continue BIPAP, keep O2 sats >90%, continue bronchodilators, steroid-taper when clinically indicated, aspiration precautions, HOB 30 degrees and bronchial hygiene. CTA chest 3/1-no PE,,, see full report. ABG and CXR 3/2 reviewed . Repeat CXR in am.  CVS: Tachy w/HR low 100's, BP mildly elevated-monitor for now, hydralazine/HCTZ listed on home med's. Telemetry on bedside monitoring shows ST w/rate 102,  ECG 3/1 reviewed ST w/ PVC's rate 140, Echo 10/4/2010 reviewed, showed EF 65%, nwa. Repeat Echo  ID: Ab- Vanco/Levaquin, blood cultures X 1 (3/1) prelim result growing GPC in pairs chains, final result pending, Repeat blood cx (3/2) NGTD. Leukocytosis but currently afebrile. Trend temp and wbc, sepsis bundle per protocol  ENDO:  Monitor glucose,   GI: PPI   METABOLIC:   Monitor lytes, replaced as needed   RENAL:  Monitor renal/creat  CNS:  Monitor mentation   HEMATOLOGY: Monitor H/H and platelets  MUSCULOSKELETAL: Moves all extremities   PAIN AND SEDATION:  PRN  NUTRITION: NPO while on BIPAP  ADVANCE DIRECTIVE:  Full code, Palliative following  FAMILY DISCUSSION: No family at bedside  Lines:  PIV  Further recommendations will be based on the patient's response to recommended treatment and results of the investigation ordered.     Quality Care: PPI, DVT prophylaxis, HOB elevated, Infection control all reviewed and addressed. Events and notes from last 24 hours reviewed. Care plan discussed with nursing         Subjective/History: This patient has been seen and evaluated at the request of Merna Flores (PA-Hospitalist) for Acute respiratory failure/COPD exacerbation/PNA. HPI obtained mostly from records reviewed,  Pt currently on BIPAP. Patient is a 81 y.o. male PMHx: Lung Cancer, HTN, BPH, COPD and arthritis presented to ED via EMS due to SOB after walking in the parking lot and nonproductive cough. In ED placed on BIPAP with improvement, had ABG and CXR. Transfer to ICU stepdown for further monitoring. Pt currently on BIPAP with sats 98% on bedside monitoring , he report sleepy, response appropriately and follow commands. Medication Review:  · Pressors - none  · Sedation - none  · Antibiotics - vancomycin, levaquin  · Pain - none  · GI/ DVT - protonix  · Others (other gtts)    Past Medical History:   Diagnosis Date    Arthritis     Arthropathy, unspecified, site unspecified     Asthma     BPH (benign prostatic hyperplasia)     COPD [facility]chronic obstructive pulmonary disease) ([facility] [address]     Hypertension     Microscopic hematuria       Past Surgical History:   Procedure Laterality Date    HX AAA REPAIR      HX COLONOSCOPY  2002    HX HERNIA REPAIR  2/6/2004    HX OTHER SURGICAL      Biopsy Prostate    HX OTHER SURGICAL  1/10/2008    HX VASCULAR ACCESS        Prior to Admission medications    Medication Sig Start Date End Date Taking? Authorizing Provider   loratadine (CLARITIN) 10 mg tablet Take 10 mg by mouth daily. Yes Historical Provider   GUAIFENESIN/CODEINE PHOSPHATE (ROBITUSSIN A-C PO) Take  by mouth. Historical Provider   predniSONE (DELTASONE) 10 mg tablet Take  by mouth daily (with breakfast). Historical Provider   hydralazine-hydrochlorothiazid 25-25 mg cap Take  by mouth. Yes Historical Provider   aspirin delayed-release (ASPIRIN LOW DOSE) 81 mg tablet Take  by mouth daily. Yes Historical Provider   lovastatin (MEVACOR) 40 mg tablet Take 40 mg by mouth nightly. Yes Historical Provider   ALPRAZolam (XANAX) 0.25 mg tablet Take  by mouth. Yes Historical Provider   ipratropium-albuterol (COMBIVENT)  mcg/actuation inhaler Take  by inhalation every six (6) hours as needed. Historical Provider   therapeutic multivitamin (THERAGRAN) tablet Take 1 Tab by mouth daily. Yes Historical Provider   dutaseride (AVODART) 0.5 mg capsule Take 0.5 mg by mouth daily. Yes Historical Provider   fluticasone-salmeterol (ADVAIR DISKUS) 500-50 mcg/dose diskus inhaler Take 1 Puff by inhalation every twelve (12) hours. Yes Historical Provider   albuterol sulfate (PROVENTIL;VENTOLIN) 2.5 mg/0.5 mL Nebu 2.5 mg by Nebulization route once. Yes Historical Provider   tiotropium (SPIRIVA WITH HANDIHALER) 18 mcg inhalation capsule Take 1 Cap by inhalation daily. Yes Historical Provider   albuterol (PROVENTIL HFA, VENTOLIN HFA) 90 mcg/actuation inhaler Take  by inhalation.      Yes Historical Provider     Current Facility-Administered Medications   Medication Dose Route Frequency    budesonide (PULMICORT) 500 mcg/2 ml nebulizer suspension  500 mcg Nebulization BID RT    arformoterol (BROVANA) neb solution 15 mcg  15 mcg Nebulization BID RT    vancomycin (VANCOCIN) 1,500 mg in 0.9% sodium chloride 500 mL IVPB  1,500 mg IntraVENous ONCE    [START ON 3/3/2017] vancomycin (VANCOCIN) 750 mg in 0.9% sodium chloride (MBP/ADV) 250 mL ADV  750 mg IntraVENous Q12H    [START ON 3/4/2017] VANCOMYCIN INFORMATION NOTE   Other ONCE    sodium chloride (NS) flush 5-10 mL  5-10 mL IntraVENous Q8H    enoxaparin (LOVENOX) injection 40 mg  40 mg SubCUTAneous Q24H    albuterol-ipratropium (DUO-NEB) 2.5 MG-0.5 MG/3 ML  3 mL Nebulization Q6H RT    aspirin delayed-release tablet 81 mg  81 mg Oral DAILY    levoFLOXacin (LEVAQUIN) 750 mg in D5W IVPB  750 mg IntraVENous Q24H    methylPREDNISolone (PF) (SOLU-MEDROL) injection 40 mg  40 mg IntraVENous Q6H     No Known Allergies   Social History   Substance Use Topics    Smoking status: Former Smoker     Packs/day: 0.30     Years: 55.00     Types: Cigarettes     Quit date: 2004    Smokeless tobacco: Not on file    Alcohol use No      Family History   Problem Relation Age of Onset    Asthma Father         Review of Systems:   Unable to obtain, pt on BIPAP      Objective:   Vital Signs:    Visit Vitals    /87    Pulse (!) 111    Temp 98.1 °F (36.7 °C)    Resp 16    Ht 5' 6\" (1.676 m)    Wt 67.1 kg (147 lb 14.9 oz)    SpO2 99%    BMI 23.88 kg/m2       O2 Device: BIPAP   O2 Flow Rate (L/min): 3 l/min   Temp (24hrs), Av.8 °F (37.1 °C), Min:97.7 °F (36.5 °C), Max:100.4 °F (38 °C)       Intake/Output:   Last shift:       07 -  1900  In: 900 [P.O.:400; I.V.:500]  Out: 450 [Urine:300]  Last 3 shifts:  190 -  0700  In: 295 [P.O.:145; I.V.:150]  Out: 610 [Urine:395]    Intake/Output Summary (Last 24 hours) at 17 1543  Last data filed at 17 1500   Gross per 24 hour   Intake             1195 ml   Output             1060 ml   Net              135 ml       Physical Exam:  General:   On BIPAP, sleepy but arousable, response appropriately and following commands.    Head:   Normocephalic, without obvious abnormality, atraumatic.    Eyes:   Conjunctivae/corneas clear. PERRL, EOMs intact.    Nose:  Nares normal. Septum midline. Mucosa normal. No drainage or sinus tenderness.    Throat:  Lips, mucosa normal.    Neck:  Supple, symmetrical, trachea midline.    Back:   Symmetric, no curvature. ROM normal.    Lungs:   Coarse breath sounds, no wheezing on auscultation bilaterally.    Chest wall:   No tenderness or deformity.    Heart:   Tachy, Regular rate and rhythm,  no murmur   Abdomen:   Soft, non-tender.  Bowel sounds normal. No masses, No organomegaly.    Extremities:  Extremities normal, atraumatic, no cyanosis or edema.  Pulses:  2+ and symmetric all extremities.    Skin:  Skin color, texture, turgor normal.    Lymph nodes:        Cervical, supraclavicular, and axillary nodes normal.    Neurologic:  Grossly nonfocal          Data:     Recent Results (from the past 12 hour(s))   POC G3    Collection Time: 03/02/17  7:59 AM   Result Value Ref Range    Device: BIPAP      FIO2 (POC) 30 %    pH (POC) 7.446 7.35 - 7.45      pCO2 (POC) 49.7 (H) 35.0 - 45.0 MMHG    pO2 (POC) 76 (L) 80 - 100 MMHG    HCO3 (POC) 34.4 (H) 22 - 26 MMOL/L    sO2 (POC) 96 92 - 97 %    Base excess (POC) 10 mmol/L    PEEP/CPAP (POC) 6 cmH2O    PIP (POC) 14      Allens test (POC) YES      Total resp. rate 24      Site RIGHT RADIAL      Patient temp. 97.7      Specimen type (POC) ARTERIAL      Performed by Melanie Kawasaki              Telemetry:  ST    Imaging:  I have personally reviewed the patients radiographs and have reviewed the reports:      Results from Hospital Encounter encounter on 03/01/17   XR CHEST PORT   Narrative EXAM: Chest portable    INDICATION: Short of breath    COMPARISON: Two-view chest 7/9/2010    _______________    FINDINGS:    AP portable chest film was performed. 2 images were performed to include the  costophrenic angle regions. Lungs are hyperinflated. There are new bilateral  lower lobe infiltrates with slightly increased interstitial markings in the  midlung fields. Probable upper lobe bullous disease. No definite effusion. No  pneumothorax. Heart and pulmonary vascularity are normal for AP technique. 3  small metallic densities project over the left apical region. These are new  compared to previous exams. _______________         Impression IMPRESSION:      1. Probable COPD with new bilateral lower lobe areas of infiltrate. Differential  diagnosis includes pneumonia, pulmonary edema in the face of severe COPD and  aspiration.             Results from [place] encounter on 03/01/17   CTA CHEST W WO CONT   Narrative EXAM: CTA Chest    INDICATION: Chest pain    COMPARISON: Single view chest done earlier today. TECHNIQUE: Axial CT imaging from the thoracic inlet through the diaphragm with  intravenous contrast. Coronal and sagittal MIP reformats were generated. One or more dose reduction techniques were used on this CT: automated exposure  control, adjustment of the mAs and/or kVp according to patient's size, and  iterative reconstruction techniques. The specific techniques utilized on this CT  exam have been documented in the patient's electronic medical record.    _______________    FINDINGS:    EXAM QUALITY: Overall exam quality is adequate with good opacification of  pulmonary arteries. Mild respiratory artifact in the lung bases. PULMONARY ARTERIES: No evidence of pulmonary embolism. MEDIASTINUM: Heart size is normal. No evidence of right heart strain. No  pericardial effusion. There is left ventricular hypertrophy. Atherosclerotic  changes of aorta. Esophagus is unremarkable. LYMPH NODES: There are some nonspecific normal sized superior mediastinal lymph  nodes. No pathologically enlarged adenopathy. AIRWAY: Normal.    LUNGS: There is a spiculated masslike opacity in the left upper lobe with some  high density areas. This probably represent fiducial markers in the left upper  lobe mass. Fiducial markers were seen on chest x-ray done today but were new  compared to prior exams. There are bilateral patchy lower lobe infiltrates which  are also seen on chest x-ray today. These are new finding compared to prior  chest x-rays. There is a tree-in-bud appearance particularly in the right lower  lobe associated with the area of opacity. Patchy density inferior left upper  lobe could represent scarring or small focal acute infiltrate. Similar changes  are present in the right middle lobe No discrete pulmonary nodules other than  that described in the left upper lobe.  Emphysematous changes with right upper  lobe scarring. PLEURA: No effusion or pneumothorax    UPPER ABDOMEN: Multiple bilateral simple renal cysts. There is a 1 in the  superior medial right kidney on axial image 236 which is less obviously cystic. This may be secondary to its small size and volume averaging. This measures 15  mm. Hounsfield units are approximately 35 for this particular lesion. Upper  abdominal structures are otherwise unremarkable. OTHER: No acute or aggressive osseous abnormalities identified. _______________         Impression IMPRESSION:    1. No evidence of pulmonary embolism. 2.  Spiculated left upper lobe lung mass with fiducial markers present within it  consistent with lung carcinoma. 3. Bilateral lower lobe patchy infiltrates which are also seen to be new on  chest x-ray done today. Findings are consistent with either pneumonia or  aspiration. 4. COPD with emphysematous changes. 5. Patchy areas of opacity in the inferior left upper lobe and right middle lobe  which could represent scarring or acute areas of infiltrate. 6. Multiple bilateral renal cysts. At least one in the medial upper pole region  of the right kidney is indeterminate. Solid mass in this location cannot be  ruled out.         Nelly Juan NP  Pulmonary Critical Care & Sleep Medicine   [address] [place] [address], [address] [place] [address]  [phone]

## 2017-03-03 ENCOUNTER — APPOINTMENT (OUTPATIENT)
Dept: GENERAL RADIOLOGY | Age: 82
DRG: 189 | End: 2017-03-03
Attending: PHYSICIAN ASSISTANT
Payer: MEDICARE

## 2017-03-03 PROBLEM — R06.02 SHORTNESS OF BREATH: Status: ACTIVE | Noted: 2017-03-03

## 2017-03-03 LAB
ANION GAP BLD CALC-SCNC: 11 MMOL/L (ref 3–18)
BASOPHILS # BLD AUTO: 0 K/UL (ref 0–0.06)
BASOPHILS # BLD: 0 % (ref 0–2)
BUN SERPL-MCNC: 27 MG/DL (ref 7–18)
BUN/CREAT SERPL: 34 (ref 12–20)
CALCIUM SERPL-MCNC: 8.5 MG/DL (ref 8.5–10.1)
CHLORIDE SERPL-SCNC: 98 MMOL/L (ref 100–108)
CO2 SERPL-SCNC: 29 MMOL/L (ref 21–32)
CREAT SERPL-MCNC: 0.79 MG/DL (ref 0.6–1.3)
DIFFERENTIAL METHOD BLD: ABNORMAL
EOSINOPHIL # BLD: 0 K/UL (ref 0–0.4)
EOSINOPHIL NFR BLD: 0 % (ref 0–5)
ERYTHROCYTE [DISTWIDTH] IN BLOOD BY AUTOMATED COUNT: 14.1 % (ref 11.6–14.5)
GLUCOSE BLD STRIP.AUTO-MCNC: 137 MG/DL (ref 70–110)
GLUCOSE BLD STRIP.AUTO-MCNC: 153 MG/DL (ref 70–110)
GLUCOSE SERPL-MCNC: 153 MG/DL (ref 74–99)
HCT VFR BLD AUTO: 36.2 % (ref 36–48)
HGB BLD-MCNC: 11.8 G/DL (ref 13–16)
LYMPHOCYTES # BLD AUTO: 4 % (ref 21–52)
LYMPHOCYTES # BLD: 0.5 K/UL (ref 0.9–3.6)
MCH RBC QN AUTO: 26.1 PG (ref 24–34)
MCHC RBC AUTO-ENTMCNC: 32.6 G/DL (ref 31–37)
MCV RBC AUTO: 80.1 FL (ref 74–97)
MONOCYTES # BLD: 0.8 K/UL (ref 0.05–1.2)
MONOCYTES NFR BLD AUTO: 6 % (ref 3–10)
NEUTS SEG # BLD: 12.7 K/UL (ref 1.8–8)
NEUTS SEG NFR BLD AUTO: 90 % (ref 40–73)
PLATELET # BLD AUTO: 328 K/UL (ref 135–420)
PMV BLD AUTO: 10.1 FL (ref 9.2–11.8)
POTASSIUM SERPL-SCNC: 4 MMOL/L (ref 3.5–5.5)
RBC # BLD AUTO: 4.52 M/UL (ref 4.7–5.5)
SODIUM SERPL-SCNC: 138 MMOL/L (ref 136–145)
WBC # BLD AUTO: 14.1 K/UL (ref 4.6–13.2)

## 2017-03-03 PROCEDURE — 82962 GLUCOSE BLOOD TEST: CPT

## 2017-03-03 PROCEDURE — 74011000250 HC RX REV CODE- 250: Performed by: NURSE PRACTITIONER

## 2017-03-03 PROCEDURE — 80048 BASIC METABOLIC PNL TOTAL CA: CPT | Performed by: PHYSICIAN ASSISTANT

## 2017-03-03 PROCEDURE — 94660 CPAP INITIATION&MGMT: CPT

## 2017-03-03 PROCEDURE — 74011258636 HC RX REV CODE- 258/636: Performed by: INTERNAL MEDICINE

## 2017-03-03 PROCEDURE — 94640 AIRWAY INHALATION TREATMENT: CPT

## 2017-03-03 PROCEDURE — 77030014143 HC TY PUNC LUMBR BD -A

## 2017-03-03 PROCEDURE — 74011250636 HC RX REV CODE- 250/636: Performed by: INTERNAL MEDICINE

## 2017-03-03 PROCEDURE — 74011000250 HC RX REV CODE- 250: Performed by: PHYSICIAN ASSISTANT

## 2017-03-03 PROCEDURE — C9113 INJ PANTOPRAZOLE SODIUM, VIA: HCPCS | Performed by: NURSE PRACTITIONER

## 2017-03-03 PROCEDURE — 85025 COMPLETE CBC W/AUTO DIFF WBC: CPT | Performed by: PHYSICIAN ASSISTANT

## 2017-03-03 PROCEDURE — 74011250636 HC RX REV CODE- 250/636: Performed by: EMERGENCY MEDICINE

## 2017-03-03 PROCEDURE — 71010 XR CHEST PORT: CPT

## 2017-03-03 PROCEDURE — 74011250636 HC RX REV CODE- 250/636: Performed by: PHYSICIAN ASSISTANT

## 2017-03-03 PROCEDURE — 65660000001 HC RM ICU INTERMED STEPDOWN

## 2017-03-03 PROCEDURE — 74011250636 HC RX REV CODE- 250/636: Performed by: FAMILY MEDICINE

## 2017-03-03 PROCEDURE — 93306 TTE W/DOPPLER COMPLETE: CPT

## 2017-03-03 PROCEDURE — 36415 COLL VENOUS BLD VENIPUNCTURE: CPT | Performed by: PHYSICIAN ASSISTANT

## 2017-03-03 PROCEDURE — 74011250636 HC RX REV CODE- 250/636: Performed by: NURSE PRACTITIONER

## 2017-03-03 RX ORDER — DEXTROSE, SODIUM CHLORIDE, SODIUM LACTATE, POTASSIUM CHLORIDE, AND CALCIUM CHLORIDE 5; .6; .31; .03; .02 G/100ML; G/100ML; G/100ML; G/100ML; G/100ML
50 INJECTION, SOLUTION INTRAVENOUS CONTINUOUS
Status: DISCONTINUED | OUTPATIENT
Start: 2017-03-03 | End: 2017-03-05

## 2017-03-03 RX ADMIN — Medication 10 ML: at 21:24

## 2017-03-03 RX ADMIN — IPRATROPIUM BROMIDE AND ALBUTEROL SULFATE 3 ML: .5; 3 SOLUTION RESPIRATORY (INHALATION) at 20:44

## 2017-03-03 RX ADMIN — SODIUM CHLORIDE 40 MG: 9 INJECTION INTRAMUSCULAR; INTRAVENOUS; SUBCUTANEOUS at 18:09

## 2017-03-03 RX ADMIN — METHYLPREDNISOLONE SODIUM SUCCINATE 40 MG: 125 INJECTION, POWDER, FOR SOLUTION INTRAMUSCULAR; INTRAVENOUS at 21:23

## 2017-03-03 RX ADMIN — METHYLPREDNISOLONE SODIUM SUCCINATE 40 MG: 125 INJECTION, POWDER, FOR SOLUTION INTRAMUSCULAR; INTRAVENOUS at 18:00

## 2017-03-03 RX ADMIN — LEVOFLOXACIN 750 MG: 5 INJECTION, SOLUTION INTRAVENOUS at 18:08

## 2017-03-03 RX ADMIN — IPRATROPIUM BROMIDE AND ALBUTEROL SULFATE 3 ML: .5; 3 SOLUTION RESPIRATORY (INHALATION) at 07:30

## 2017-03-03 RX ADMIN — SODIUM CHLORIDE, SODIUM LACTATE, POTASSIUM CHLORIDE, CALCIUM CHLORIDE, AND DEXTROSE MONOHYDRATE 50 ML/HR: 600; 310; 30; 20; 5 INJECTION, SOLUTION INTRAVENOUS at 12:00

## 2017-03-03 RX ADMIN — ENOXAPARIN SODIUM 40 MG: 40 INJECTION SUBCUTANEOUS at 21:24

## 2017-03-03 RX ADMIN — IPRATROPIUM BROMIDE AND ALBUTEROL SULFATE 3 ML: .5; 3 SOLUTION RESPIRATORY (INHALATION) at 01:13

## 2017-03-03 RX ADMIN — IPRATROPIUM BROMIDE AND ALBUTEROL SULFATE 3 ML: .5; 3 SOLUTION RESPIRATORY (INHALATION) at 14:01

## 2017-03-03 RX ADMIN — METHYLPREDNISOLONE SODIUM SUCCINATE 40 MG: 125 INJECTION, POWDER, FOR SOLUTION INTRAMUSCULAR; INTRAVENOUS at 04:16

## 2017-03-03 NOTE — ROUTINE PROCESS
Receive patient on BIPAP 13/6 and FiO2 30%. - 2020 patient requested to take BIPAP mask off and go on 3 lpm via NC.   - no respiratory distress noted. Plan: Place patient back on BIPAP prn or when patient goes to sleep. 113 Patient placed back on BIPAP 13/6 FiO2 30%. Will continue to monitor patient closely.

## 2017-03-03 NOTE — CONSULTS
[facility]  Pulmonary, Critical Care, and Sleep Medicine    Name: Clark Ruiz MRN: [id number]   : 1935 Hospital: [facility]   Date: 3/3/2017        Critical Care Initial Patient Consult    IMPRESSION:   · Acute on chronic hypoxemic resp failure likely multifactorial: infection/PNA, COPD exacerbation and possible mets w/hx CA of lung and other causes. On BIPAP  · COPD exacerbation, on home O2 baseline  · Hx Lung Cancer/lung mass  · Hx CAD-on Aspirin, Stress Echo 3/25-no ischemia, Nuclear stress test (3/30/2007) low probability of ischemia. · Hx HTN, BPH, OA, PAD. PLAN:     RS: Continue BIPAP, keep O2 sats >90%, O2/NC PRN rest in between BIPAP. Continue bronchodilators, steroid-taper when clinically indicated, aspiration precautions, HOB 30 degrees and bronchial hygiene. CTA chest 3/1-no PE. ABG PRN/or change condition. Repeat CXR 3/3 reviewed-mildly improved  CVS: BNP ok (235) Troponin-ok  (235). Remain tachy w/HR low 100's, BP remain elevated ranging 125//85, plan to add low dose Cardizem (short acting in divided dose) if off BIPAP, will not restart hydralazine/HCTZ listed on home med's. Telemetry on bedside monitoring shows ST w/rate 102,  ECG 3/1 reviewed ST w/ PVC's rate 140, Echo 10/4/2010 reviewed, showed EF 65%, nwa. Repeat Echo 3/2/17 reviewed EF 55-60%, nwa G1DD,  ID: Ab- Vanco/Levaquin, blood cultures X 1 (3/1) prelim result growing GPC in pairs chains/strep pneumonia. Sputum cx; prelim. Growing GPC/GNR-final result pending, Repeat blood cx (3/2) NGTD. Leukocytosis trending up but currently afebrile.  Trend temp and wbc, sepsis bundle per protocol  ENDO:  Monitor glucose,   GI: PPI   METABOLIC:   Monitor lytes, replaced as needed   RENAL:  Monitor renal/creat  CNS:  Monitor mentation   HEMATOLOGY: Monitor H/H and platelets  MUSCULOSKELETAL: Moves all extremities   PAIN AND SEDATION:  PRN  NUTRITION: NPO while on BIPAP, D5 LR 50 ml/hr  ADVANCE DIRECTIVE:  Full code, Palliative following  FAMILY DISCUSSION: No family at bedside  Lines:  PIV  Further recommendations will be based on the patient's response to recommended treatment and results of the investigation ordered. Quality Care: PPI, DVT prophylaxis, HOB elevated, Infection control all reviewed and addressed. Events and notes from last 24 hours reviewed. Care plan discussed with nursing         Subjective/History: This patient has been seen and evaluated at the request of Chalo Sams (PA-Hospitalist) for Acute respiratory failure/COPD exacerbation/PNA. HPI obtained mostly from records reviewed,  Pt currently on BIPAP. Patient is a 81 y.o. male PMHx: Lung Cancer, HTN, BPH, COPD and arthritis presented to ED via EMS due to SOB after walking in the parking lot and nonproductive cough. In ED placed on BIPAP with improvement, had ABG and CXR. Transfer to ICU stepdown for further monitoring. 3/2/17  Pt currently on BIPAP with sats 98% on bedside monitoring , he report sleepy, response appropriately and follow commands. 3/3/17   Pt remain on BIPAP with sats 98%. No accessory muscle use noted. Per RN pt able to be off BIPAP for 2 hrs-had sips of liquid, still sleepy this am, able to response simple question by nodding head,follows commands, he did not like taking off blanket for assessment, no distress noted. Glucose elevated, continue to monitor, no hx DM  CXR today better  Elevated BP/Tachy will add low dose Cardizem (short acting in divided dose) po if able to wean off BIPAP, otherwise monitor for now. Medication Review:  · Pressors - none  · Sedation - none  · Antibiotics - vancomycin, levaquin  · Pain - none  · GI/ DVT - protonix  · Others (other gtts)D5LR 50 ml/hr.     Past Medical History:   Diagnosis Date    Arthritis     Arthropathy, unspecified, site unspecified     Asthma     BPH (benign prostatic hyperplasia)     COPD [facility]chronic obstructive pulmonary disease) ([facility] [address]     Hypertension     Microscopic hematuria       Past Surgical History:   Procedure Laterality Date    HX AAA REPAIR      HX COLONOSCOPY  2002    HX HERNIA REPAIR  2/6/2004    HX OTHER SURGICAL      Biopsy Prostate    HX OTHER SURGICAL  1/10/2008    HX VASCULAR ACCESS        Prior to Admission medications    Medication Sig Start Date End Date Taking? Authorizing Provider   loratadine (CLARITIN) 10 mg tablet Take 10 mg by mouth daily. Yes Historical Provider   GUAIFENESIN/CODEINE PHOSPHATE (ROBITUSSIN A-C PO) Take  by mouth. Historical Provider   predniSONE (DELTASONE) 10 mg tablet Take  by mouth daily (with breakfast). Historical Provider   hydralazine-hydrochlorothiazid 25-25 mg cap Take  by mouth. Yes Historical Provider   aspirin delayed-release (ASPIRIN LOW DOSE) 81 mg tablet Take  by mouth daily. Yes Historical Provider   lovastatin (MEVACOR) 40 mg tablet Take 40 mg by mouth nightly. Yes Historical Provider   ALPRAZolam (XANAX) 0.25 mg tablet Take  by mouth. Yes Historical Provider   ipratropium-albuterol (COMBIVENT)  mcg/actuation inhaler Take  by inhalation every six (6) hours as needed. Historical Provider   therapeutic multivitamin (THERAGRAN) tablet Take 1 Tab by mouth daily. Yes Historical Provider   dutaseride (AVODART) 0.5 mg capsule Take 0.5 mg by mouth daily. Yes Historical Provider   fluticasone-salmeterol (ADVAIR DISKUS) 500-50 mcg/dose diskus inhaler Take 1 Puff by inhalation every twelve (12) hours. Yes Historical Provider   albuterol sulfate (PROVENTIL;VENTOLIN) 2.5 mg/0.5 mL Nebu 2.5 mg by Nebulization route once. Yes Historical Provider   tiotropium (SPIRIVA WITH HANDIHALER) 18 mcg inhalation capsule Take 1 Cap by inhalation daily. Yes Historical Provider   albuterol (PROVENTIL HFA, VENTOLIN HFA) 90 mcg/actuation inhaler Take  by inhalation.      Yes Historical Provider     Current Facility-Administered Medications   Medication Dose Route Frequency    dextrose 5% lactated ringers infusion  50 mL/hr IntraVENous CONTINUOUS    methylPREDNISolone (PF) (SOLU-MEDROL) injection 40 mg  40 mg IntraVENous Q8H    [START ON 3/4/2017] VANCOMYCIN INFORMATION NOTE   Other ONCE    pantoprazole (PROTONIX) 40 mg in sodium chloride 0.9 % 10 mL injection  40 mg IntraVENous Q24H    sodium chloride (NS) flush 5-10 mL  5-10 mL IntraVENous Q8H    enoxaparin (LOVENOX) injection 40 mg  40 mg SubCUTAneous Q24H    albuterol-ipratropium (DUO-NEB) 2.5 MG-0.5 MG/3 ML  3 mL Nebulization Q6H RT    aspirin delayed-release tablet 81 mg  81 mg Oral DAILY    levoFLOXacin (LEVAQUIN) 750 mg in D5W IVPB  750 mg IntraVENous Q24H     No Known Allergies   Social History   Substance Use Topics    Smoking status: Former Smoker     Packs/day: 0.30     Years: 55.00     Types: Cigarettes     Quit date: 2004    Smokeless tobacco: Not on file    Alcohol use No      Family History   Problem Relation Age of Onset    Asthma Father         Review of Systems:   Unable to obtain, pt on BIPAP      Objective:   Vital Signs:    Visit Vitals    /85 (BP 1 Location: Right arm, BP Patient Position: Sitting)    Pulse (!) 112    Temp 97.9 °F (36.6 °C)    Resp 22    Ht 5' 6\" (1.676 m)    Wt 67.1 kg (147 lb 14.9 oz)    SpO2 97%    BMI 23.88 kg/m2       O2 Device: BIPAP   O2 Flow Rate (L/min): 3 l/min   Temp (24hrs), Av.3 °F (36.8 °C), Min:97.3 °F (36.3 °C), Max:99 °F (37.2 °C)       Intake/Output:   Last shift:       07 - 1900  In: 5097 [P.O.:400;  I.V.:650]  Out: 300 [Urine:300]  Last 3 shifts: 1901 -  0700  In: 2062.5 [P.O.:545; I.V.:1517.5]  Out: 1935 [Urine:1570]    Intake/Output Summary (Last 24 hours) at 17 1527  Last data filed at 17 1300   Gross per 24 hour   Intake           1917.5 ml   Output             1175 ml   Net            742.5 ml       Physical Exam:  General:   On BIPAP, sleepy but arousable, response appropriately and following commands.    Head:   Normocephalic, without obvious abnormality, atraumatic.    Eyes:   Conjunctivae/corneas clear. PERRL, EOMs intact.    Nose:  Nares normal. Septum midline. Mucosa normal. No drainage or sinus tenderness.    Throat:  Lips, mucosa normal.    Neck:  Supple, symmetrical, trachea midline.    Back:   Symmetric, no curvature. ROM normal.    Lungs:   Coarse breath sounds, no wheezing on auscultation bilaterally.    Chest wall:   No tenderness or deformity.    Heart:   Tachy, Regular rate and rhythm,  no murmur   Abdomen:   Soft, non-tender. Bowel sounds normal. No masses, No organomegaly.    Extremities:  Extremities normal, atraumatic, no cyanosis or edema.    Pulses:  2+ and symmetric all extremities.    Skin:  Skin color, texture, turgor normal.    Lymph nodes:        Cervical, supraclavicular, and axillary nodes normal.    Neurologic:  Grossly nonfocal          Data:     Recent Results (from the past 12 hour(s))   CBC WITH AUTOMATED DIFF    Collection Time: 03/03/17  4:00 AM   Result Value Ref Range    WBC 14.1 (H) 4.6 - 13.2 K/uL    RBC 4.52 (L) 4.70 - 5.50 M/uL    HGB 11.8 (L) 13.0 - 16.0 g/dL    HCT 36.2 36.0 - 48.0 %    MCV 80.1 74.0 - 97.0 FL    MCH 26.1 24.0 - 34.0 PG    MCHC 32.6 31.0 - 37.0 g/dL    RDW 14.1 11.6 - 14.5 %    PLATELET 277 429 - 153 K/uL    MPV 10.1 9.2 - 11.8 FL    NEUTROPHILS 90 (H) 40 - 73 %    LYMPHOCYTES 4 (L) 21 - 52 %    MONOCYTES 6 3 - 10 %    EOSINOPHILS 0 0 - 5 %    BASOPHILS 0 0 - 2 %    ABS. NEUTROPHILS 12.7 (H) 1.8 - 8.0 K/UL    ABS. LYMPHOCYTES 0.5 (L) 0.9 - 3.6 K/UL    ABS. MONOCYTES 0.8 0.05 - 1.2 K/UL    ABS. EOSINOPHILS 0.0 0.0 - 0.4 K/UL    ABS.  BASOPHILS 0.0 0.0 - 0.06 K/UL    DF AUTOMATED     METABOLIC PANEL, BASIC    Collection Time: 03/03/17  4:00 AM   Result Value Ref Range    Sodium 138 136 - 145 mmol/L    Potassium 4.0 3.5 - 5.5 mmol/L    Chloride 98 (L) 100 - 108 mmol/L    CO2 29 21 - 32 mmol/L    Anion gap 11 3.0 - 18 mmol/L    Glucose 153 (H) 74 - 99 mg/dL    BUN 27 (H) 7.0 - 18 MG/DL    Creatinine 0.79 0.6 - 1.3 MG/DL    BUN/Creatinine ratio 34 (H) 12 - 20      GFR est AA >60 >60 ml/min/1.73m2    GFR est non-AA >60 >60 ml/min/1.73m2    Calcium 8.5 8.5 - 10.1 MG/DL   GLUCOSE, POC    Collection Time: 03/03/17 11:17 AM   Result Value Ref Range    Glucose (POC) 153 (H) 70 - 110 mg/dL   GLUCOSE, POC    Collection Time: 03/03/17 12:02 PM   Result Value Ref Range    Glucose (POC) 137 (H) 70 - 110 mg/dL             Telemetry:  ST    Imaging:  I have personally reviewed the patients radiographs and have reviewed the reports:      Results from Hospital Encounter encounter on 03/01/17   XR CHEST PORT   Narrative EXAM: Chest, portable upright, one view    INDICATION: Shortness of breath    COMPARISON: 3/1/2017    _______________    FINDINGS:    Cardiac silhouette and pulmonary vascularity are normal. The visible spiculated  mass left apex measures about 16 mm, contains 3 fiducials, without change. Interval improving streaky opacities in the lung bases. Lungs are hyperinflated. The right costophrenic angle is sharp, the left costophrenic angle is barely  incompletely included. No pneumothorax.    _______________         Impression IMPRESSION:    Hyperinflation. Improving, nearly resolved bibasilar atelectasis and/or  infiltrate. Left apical mass containing fiducials, without apparent change. Results from Hospital Encounter encounter on 03/01/17   CTA CHEST W WO CONT   Addendum Addendum:   Spiculated mass containing fiducials in the left upper lobe is difficult  to measure due to linear extension of atelectasis or scarring peripherally  from its lateral margin. It measures approximately 2.4 x 1.3 cm. In comparison with  a CT scan an outside institute dated 8/20/2016, the measurements are unchanged.       Monica Tidwell MD 3/3/2017  8:05 AM             Narrative EXAM: CTA Chest    INDICATION: Chest pain    COMPARISON: Single view chest done earlier today. TECHNIQUE: Axial CT imaging from the thoracic inlet through the diaphragm with  intravenous contrast. Coronal and sagittal MIP reformats were generated. One or more dose reduction techniques were used on this CT: automated exposure  control, adjustment of the mAs and/or kVp according to patient's size, and  iterative reconstruction techniques. The specific techniques utilized on this CT  exam have been documented in the patient's electronic medical record.    _______________    FINDINGS:    EXAM QUALITY: Overall exam quality is adequate with good opacification of  pulmonary arteries. Mild respiratory artifact in the lung bases. PULMONARY ARTERIES: No evidence of pulmonary embolism. MEDIASTINUM: Heart size is normal. No evidence of right heart strain. No  pericardial effusion. There is left ventricular hypertrophy. Atherosclerotic  changes of aorta. Esophagus is unremarkable. LYMPH NODES: There are some nonspecific normal sized superior mediastinal lymph  nodes. No pathologically enlarged adenopathy. AIRWAY: Normal.    LUNGS: There is a spiculated masslike opacity in the left upper lobe with some  high density areas. This probably represent fiducial markers in the left upper  lobe mass. Fiducial markers were seen on chest x-ray done today but were new  compared to prior exams. There are bilateral patchy lower lobe infiltrates which  are also seen on chest x-ray today. These are new finding compared to prior  chest x-rays. There is a tree-in-bud appearance particularly in the right lower  lobe associated with the area of opacity. Patchy density inferior left upper  lobe could represent scarring or small focal acute infiltrate. Similar changes  are present in the right middle lobe No discrete pulmonary nodules other than  that described in the left upper lobe. Emphysematous changes with right upper  lobe scarring.     PLEURA: No effusion or pneumothorax    UPPER ABDOMEN: Multiple bilateral simple renal cysts. There is a 1 in the  superior medial right kidney on axial image 236 which is less obviously cystic. This may be secondary to its small size and volume averaging. This measures 15  mm. Hounsfield units are approximately 35 for this particular lesion. Upper  abdominal structures are otherwise unremarkable. OTHER: No acute or aggressive osseous abnormalities identified. _______________         Impression IMPRESSION:    1. No evidence of pulmonary embolism. 2.  Spiculated left upper lobe lung mass with fiducial markers present within it  consistent with lung carcinoma. 3. Bilateral lower lobe patchy infiltrates which are also seen to be new on  chest x-ray done today. Findings are consistent with either pneumonia or  aspiration. 4. COPD with emphysematous changes. 5. Patchy areas of opacity in the inferior left upper lobe and right middle lobe  which could represent scarring or acute areas of infiltrate. 6. Multiple bilateral renal cysts. At least one in the medial upper pole region  of the right kidney is indeterminate. Solid mass in this location cannot be  ruled out.         Madison Martinez, NP  Pulmonary Critical Care & Sleep Medicine   [address] [place] [address], [address] [place] [address]  [phone]

## 2017-03-03 NOTE — PROGRESS NOTES
[address] [place], [place] [address]pec\[facility] [place] [facility]\""ty Group  Hospitalist Division    Daily progress Note    Patient: Dee Dale MRN: [id number]  CSN: [id number]    YOB: 1935  Age: 81 y.o. Sex: male    DOA: 3/1/2017 LOS:  LOS: 2 days                    Interval Events:     Blood cultures from 3/1 growing strep pneumonia  Repeat blood cultures negative thus far    Assessment/Plan:     Chief Complaint:  Shortness of breath    Patient Active Problem List   Diagnosis Code    Lung mass R91.8    Renal mass N28.89    PAD [facility]peripheral artery disease) ([facility]) I73.9    COPD (chronic obstructive pulmonary disease) ([facility]) J44.9    Pulmonary nodules R91.8    CAD (coronary artery disease) I25.10    HTN (hypertension) I10    Hyperlipidemia E78.5    BPH (benign prostatic hypertrophy) N40.0    Respiratory failure with hypoxia ([facility]) J96.91    Shortness of breath R06.02       A/P:  - Acute on chronic hypoxic respiratory failure with respiratory distress - Continue BIPAP. PCCM following.   - Acute COPD exacerbation - Continue Solumedrol/ Duo-Nebs/ Pulmicort/ Brovana. - Pneumonia - Continue Vanco/ Levaquin. Blood cultures from 3/1 have grown strep pneumonia. Repeat cultures negative thus far. Sputum culture pending. CXR improving  - Lung mass with hx of Lung CA - s/p Neuroendocrine Carcinoma. Heme/ Onc following. Plan outpatient follow-up for mass. - Palliative care following. Assistance appreciated. He has rescinded his DDNR. Now FULL code.         Activity: Bedrest  DVT Prophylaxis: Lovenox  Peptic Ulcer Prophylaxis: NPO  Care Plan discussed with: RN    Subjective:      He is gradually feeling better. States he feels tired. Has been off of BIPAP since this morning. Mildly short of breath. Denies any chest pain. Not much appetite.      Objective:      Visit Vitals    BP (!) 174/137    Pulse (!) 108    Temp 97.9 °F (36.6 °C)    Resp 19    Ht 5' 6\" (1.676 m)    Wt 67.1 kg (147 lb 14.9 oz)    SpO2 96%    BMI 23.88 kg/m2       Physical Exam:  Gen: In general, this is a well nourished male, in no acute distress on NC.  HEENT: Sclerae nonicteric. Oral mucous membranes moist.   Neck: Supple with midline trachea. CV: Tachycardia without murmur or rub appreciated. Resp:Respirations are unlabored without use of accessory muscles. Lung fields bilaterally without wheezes or rhonchi. Diminished breath sounds bilaterally. Abd: Soft, nontender, nondistended. Extrem: Extremities are warm, without cyanosis or clubbing. No pitting pretibial edema. Palpable distal pulses X 4.   Skin: Warm, no visible rashes. Neuro: Patient is alert, oriented, and cooperative. No obvious focal defects. Moves all 4 extremities       Intake and Output:  Current Shift:  03/03 0701 - 03/03 1900  In: 800 [P.O.:200; I.V.:600]  Out: 300 [Urine:300]  Last three shifts:  03/01 1901 - 03/03 0700  In: 2062.5 [P.O.:545; I.V.:1517.5]  Out: 1935 [Urine:1570]    Recent Results (from the past 24 hour(s))   CULTURE, RESPIRATORY/SPUTUM/BRONCH W GRAM STAIN    Collection Time: 03/02/17  5:00 PM   Result Value Ref Range    Special Requests: NO SPECIAL REQUESTS      GRAM STAIN 10-25 WBC/lpf      GRAM STAIN <10 EPI/lpf      GRAM STAIN MUCUS PRESENT      GRAM STAIN        MODERATE  GRAM POSITIVE COCCI  IN PAIRS  IN CHAINS      GRAM STAIN FEW  GRAM NEGATIVE RODS        GRAM STAIN RARE  YEAST        Culture result: PENDING    CBC WITH AUTOMATED DIFF    Collection Time: 03/03/17  4:00 AM   Result Value Ref Range    WBC 14.1 (H) 4.6 - 13.2 K/uL    RBC 4.52 (L) 4.70 - 5.50 M/uL    HGB 11.8 (L) 13.0 - 16.0 g/dL    HCT 36.2 36.0 - 48.0 %    MCV 80.1 74.0 - 97.0 FL    MCH 26.1 24.0 - 34.0 PG    MCHC 32.6 31.0 - 37.0 g/dL    RDW 14.1 11.6 - 14.5 %    PLATELET 101 407 - 016 K/uL    MPV 10.1 9.2 - 11.8 FL    NEUTROPHILS 90 (H) 40 - 73 %    LYMPHOCYTES 4 (L) 21 - 52 %    MONOCYTES 6 3 - 10 %    EOSINOPHILS 0 0 - 5 %    BASOPHILS 0 0 - 2 %    ABS.  NEUTROPHILS 12.7 (H) 1.8 - 8.0 K/UL ABS. LYMPHOCYTES 0.5 (L) 0.9 - 3.6 K/UL    ABS. MONOCYTES 0.8 0.05 - 1.2 K/UL    ABS. EOSINOPHILS 0.0 0.0 - 0.4 K/UL    ABS. BASOPHILS 0.0 0.0 - 0.06 K/UL    DF AUTOMATED     METABOLIC PANEL, BASIC    Collection Time: 03/03/17  4:00 AM   Result Value Ref Range    Sodium 138 136 - 145 mmol/L    Potassium 4.0 3.5 - 5.5 mmol/L    Chloride 98 (L) 100 - 108 mmol/L    CO2 29 21 - 32 mmol/L    Anion gap 11 3.0 - 18 mmol/L    Glucose 153 (H) 74 - 99 mg/dL    BUN 27 (H) 7.0 - 18 MG/DL    Creatinine 0.79 0.6 - 1.3 MG/DL    BUN/Creatinine ratio 34 (H) 12 - 20      GFR est AA >60 >60 ml/min/1.73m2    GFR est non-AA >60 >60 ml/min/1.73m2    Calcium 8.5 8.5 - 10.1 MG/DL   GLUCOSE, POC    Collection Time: 03/03/17 11:17 AM   Result Value Ref Range    Glucose (POC) 153 (H) 70 - 110 mg/dL   GLUCOSE, POC    Collection Time: 03/03/17 12:02 PM   Result Value Ref Range    Glucose (POC) 137 (H) 70 - 110 mg/dL       Lab Results   Component Value Date/Time    Glucose 153 03/03/2017 04:00 AM    Glucose 154 03/02/2017 03:20 AM    Glucose 130 03/01/2017 02:00 PM        [facility]  Hospitalist Division  Pager:  [phone]  Office:  [phone]

## 2017-03-03 NOTE — ROUTINE PROCESS
Bedside and Verbal shift change report given to Arias Garcia (oncoming nurse) by Nitish Mosley RN (offgoing nurse). Report included the following information SBAR, Kardex, Intake/Output, MAR, Recent Results and Med Rec Status.

## 2017-03-03 NOTE — PROGRESS NOTES
Hematology/Oncology Chart Note  Lino Young  2701/01      Addendum to CT report  Spiculated mass containing fiducials in the left upper lobe is difficult to  measure due to linear extension of atelectasis or scarring peripherally from its  lateral margin. It measures approximately 2.4 x 1.3 cm.  In comparison with a CT  scan an outside institute dated 8/20/2016, the measurements are unchanged.           Mass is stable, consistent with treated disease  Available as further questions arise      Grace Dubose MD  [facility] [phone]  Cell [phone]

## 2017-03-03 NOTE — PROGRESS NOTES
[facility]  Pulmonary, Critical Care, and Sleep Medicine    Name: Corinna Connor MRN: [id number]   : 1935 Hospital: [facility]   Date: 3/3/2017        Critical Care Progress Note    IMPRESSION:   · Acute on chronic hypoxemic resp failure likely multifactorial: infection/PNA, COPD exacerbation and possible mets w/hx CA of lung and other causes. On BIPAP  · COPD exacerbation, on home O2 baseline  · Hx Lung Cancer/lung mass  · Hx CAD-on Aspirin, Stress Echo 3/25-no ischemia, Nuclear stress test (3/30/2007) low probability of ischemia. · Hx HTN, BPH, OA, PAD. PLAN:     RS: Continue BIPAP, keep O2 sats >90%, O2/NC PRN rest in between BIPAP. Continue bronchodilators, steroid-taper when clinically indicated, aspiration precautions, HOB 30 degrees and bronchial hygiene. CTA chest 3/1-no PE. ABG PRN/or change condition. Repeat CXR 3/3 reviewed-mildly improved  CVS: BNP ok (235) Troponin-ok  (235). Remain tachy w/HR low 100's, BP remain elevated ranging 125//85, plan to add low dose Cardizem (short acting in divided dose) if off BIPAP, will not restart hydralazine/HCTZ listed on home med's. Telemetry on bedside monitoring shows ST w/rate 102,  ECG 3/1 reviewed ST w/ PVC's rate 140, Echo 10/4/2010 reviewed, showed EF 65%, nwa. Repeat Echo 3/2/17 reviewed EF 55-60%, nwa G1DD,  ID: Ab- Vanco/Levaquin, blood cultures X 1 (3/1) prelim result growing GPC in pairs chains/strep pneumonia. Sputum cx; prelim. Growing GPC/GNR-final result pending, Repeat blood cx (3/2) NGTD. Leukocytosis trending up but currently afebrile.  Trend temp and wbc, sepsis bundle per protocol  ENDO:  Monitor glucose,   GI: PPI   METABOLIC:   Monitor lytes, replaced as needed   RENAL:  Monitor renal/creat  CNS:  Monitor mentation   HEMATOLOGY: Monitor H/H and platelets  MUSCULOSKELETAL: Moves all extremities   PAIN AND SEDATION:  PRN  NUTRITION: NPO while on BIPAP, D5 LR 50 ml/hr  ADVANCE DIRECTIVE: Full code, Palliative following  FAMILY DISCUSSION: No family at bedside  Lines:  PIV  Further recommendations will be based on the patient's response to recommended treatment and results of the investigation ordered. Quality Care: PPI, DVT prophylaxis, HOB elevated, Infection control all reviewed and addressed. Events and notes from last 24 hours reviewed. Care plan discussed with nursing         Subjective/History: This patient has been seen and evaluated at the request of Prema Crespo (PA-Hospitalist) for Acute respiratory failure/COPD exacerbation/PNA. HPI obtained mostly from records reviewed,  Pt currently on BIPAP. Patient is a 81 y.o. male PMHx: Lung Cancer, HTN, BPH, COPD and arthritis presented to ED via EMS due to SOB after walking in the parking lot and nonproductive cough. In ED placed on BIPAP with improvement, had ABG and CXR. Transfer to ICU stepdown for further monitoring. 3/2/17  Pt currently on BIPAP with sats 98% on bedside monitoring , he report sleepy, response appropriately and follow commands. 3/3/17   Pt remain on BIPAP with sats 98%. No accessory muscle use noted. Per RN pt able to be off BIPAP for 2 hrs-had sips of liquid, still sleepy this am, able to response simple question by nodding head,follows commands, he did not like taking off blanket for assessment, no distress noted. Glucose elevated, continue to monitor, no hx DM  CXR today better  Elevated BP/Tachy will add low dose Cardizem (short acting in divided dose) po if able to wean off BIPAP, otherwise monitor for now. Medication Review:  · Pressors - none  · Sedation - none  · Antibiotics - vancomycin, levaquin  · Pain - none  · GI/ DVT - protonix  · Others (other gtts)D5LR 50 ml/hr.     Past Medical History:   Diagnosis Date    Arthritis     Arthropathy, unspecified, site unspecified     Asthma     BPH (benign prostatic hyperplasia)     COPD (chronic obstructive pulmonary disease) ([facility] [address]     Hypertension  Microscopic hematuria       Past Surgical History:   Procedure Laterality Date    HX AAA REPAIR      HX COLONOSCOPY  2002    HX HERNIA REPAIR  2/6/2004    HX OTHER SURGICAL      Biopsy Prostate    HX OTHER SURGICAL  1/10/2008    HX VASCULAR ACCESS        Prior to Admission medications    Medication Sig Start Date End Date Taking? Authorizing Provider   loratadine (CLARITIN) 10 mg tablet Take 10 mg by mouth daily. Yes Historical Provider   GUAIFENESIN/CODEINE PHOSPHATE (ROBITUSSIN A-C PO) Take  by mouth. Historical Provider   predniSONE (DELTASONE) 10 mg tablet Take  by mouth daily (with breakfast). Historical Provider   hydralazine-hydrochlorothiazid 25-25 mg cap Take  by mouth. Yes Historical Provider   aspirin delayed-release (ASPIRIN LOW DOSE) 81 mg tablet Take  by mouth daily. Yes Historical Provider   lovastatin (MEVACOR) 40 mg tablet Take 40 mg by mouth nightly. Yes Historical Provider   ALPRAZolam (XANAX) 0.25 mg tablet Take  by mouth. Yes Historical Provider   ipratropium-albuterol (COMBIVENT)  mcg/actuation inhaler Take  by inhalation every six (6) hours as needed. Historical Provider   therapeutic multivitamin (THERAGRAN) tablet Take 1 Tab by mouth daily. Yes Historical Provider   dutaseride (AVODART) 0.5 mg capsule Take 0.5 mg by mouth daily. Yes Historical Provider   fluticasone-salmeterol (ADVAIR DISKUS) 500-50 mcg/dose diskus inhaler Take 1 Puff by inhalation every twelve (12) hours. Yes Historical Provider   albuterol sulfate (PROVENTIL;VENTOLIN) 2.5 mg/0.5 mL Nebu 2.5 mg by Nebulization route once. Yes Historical Provider   tiotropium (SPIRIVA WITH HANDIHALER) 18 mcg inhalation capsule Take 1 Cap by inhalation daily. Yes Historical Provider   albuterol (PROVENTIL HFA, VENTOLIN HFA) 90 mcg/actuation inhaler Take  by inhalation.      Yes Historical Provider     Current Facility-Administered Medications   Medication Dose Route Frequency    dextrose 5% lactated ringers infusion  50 mL/hr IntraVENous CONTINUOUS    methylPREDNISolone (PF) (SOLU-MEDROL) injection 40 mg  40 mg IntraVENous Q8H    [START ON 3/4/2017] VANCOMYCIN INFORMATION NOTE   Other ONCE    pantoprazole (PROTONIX) 40 mg in sodium chloride 0.9 % 10 mL injection  40 mg IntraVENous Q24H    sodium chloride (NS) flush 5-10 mL  5-10 mL IntraVENous Q8H    enoxaparin (LOVENOX) injection 40 mg  40 mg SubCUTAneous Q24H    albuterol-ipratropium (DUO-NEB) 2.5 MG-0.5 MG/3 ML  3 mL Nebulization Q6H RT    aspirin delayed-release tablet 81 mg  81 mg Oral DAILY    levoFLOXacin (LEVAQUIN) 750 mg in D5W IVPB  750 mg IntraVENous Q24H     No Known Allergies   Social History   Substance Use Topics    Smoking status: Former Smoker     Packs/day: 0.30     Years: 55.00     Types: Cigarettes     Quit date: 2004    Smokeless tobacco: Not on file    Alcohol use No      Family History   Problem Relation Age of Onset    Asthma Father         Review of Systems:   Unable to obtain, pt on BIPAP      Objective:   Vital Signs:    Visit Vitals    /85 (BP 1 Location: Right arm, BP Patient Position: Sitting)    Pulse (!) 112    Temp 97.9 °F (36.6 °C)    Resp 22    Ht 5' 6\" (1.676 m)    Wt 67.1 kg (147 lb 14.9 oz)    SpO2 97%    BMI 23.88 kg/m2       O2 Device: BIPAP   O2 Flow Rate (L/min): 3 l/min   Temp (24hrs), Av.3 °F (36.8 °C), Min:97.3 °F (36.3 °C), Max:99 °F (37.2 °C)       Intake/Output:   Last shift:       07 -  1900  In: 6561 [P.O.:400;  I.V.:650]  Out: 300 [Urine:300]  Last 3 shifts: 1901 -  0700  In: 2062.5 [P.O.:545; I.V.:1517.5]  Out: 1935 [Urine:1570]    Intake/Output Summary (Last 24 hours) at 17 1554  Last data filed at 17 1300   Gross per 24 hour   Intake           1917.5 ml   Output             1175 ml   Net            742.5 ml       Physical Exam:  General:   On BIPAP, sleepy but arousable, response appropriately and following commands.    Head:   Normocephalic, without obvious abnormality, atraumatic.    Eyes:   Conjunctivae/corneas clear. PERRL, EOMs intact.    Nose:  Nares normal. Septum midline. Mucosa normal. No drainage or sinus tenderness.    Throat:  Lips, mucosa normal.    Neck:  Supple, symmetrical, trachea midline.    Back:   Symmetric, no curvature. ROM normal.    Lungs:   Coarse breath sounds, no wheezing on auscultation bilaterally.    Chest wall:   No tenderness or deformity.    Heart:   Tachy, Regular rate and rhythm,  no murmur   Abdomen:   Soft, non-tender. Bowel sounds normal. No masses, No organomegaly.    Extremities:  Extremities normal, atraumatic, no cyanosis or edema.    Pulses:  2+ and symmetric all extremities.    Skin:  Skin color, texture, turgor normal.    Lymph nodes:        Cervical, supraclavicular, and axillary nodes normal.    Neurologic:  Grossly nonfocal          Data:     Recent Results (from the past 12 hour(s))   CBC WITH AUTOMATED DIFF    Collection Time: 03/03/17  4:00 AM   Result Value Ref Range    WBC 14.1 (H) 4.6 - 13.2 K/uL    RBC 4.52 (L) 4.70 - 5.50 M/uL    HGB 11.8 (L) 13.0 - 16.0 g/dL    HCT 36.2 36.0 - 48.0 %    MCV 80.1 74.0 - 97.0 FL    MCH 26.1 24.0 - 34.0 PG    MCHC 32.6 31.0 - 37.0 g/dL    RDW 14.1 11.6 - 14.5 %    PLATELET 658 538 - 542 K/uL    MPV 10.1 9.2 - 11.8 FL    NEUTROPHILS 90 (H) 40 - 73 %    LYMPHOCYTES 4 (L) 21 - 52 %    MONOCYTES 6 3 - 10 %    EOSINOPHILS 0 0 - 5 %    BASOPHILS 0 0 - 2 %    ABS. NEUTROPHILS 12.7 (H) 1.8 - 8.0 K/UL    ABS. LYMPHOCYTES 0.5 (L) 0.9 - 3.6 K/UL    ABS. MONOCYTES 0.8 0.05 - 1.2 K/UL    ABS. EOSINOPHILS 0.0 0.0 - 0.4 K/UL    ABS.  BASOPHILS 0.0 0.0 - 0.06 K/UL    DF AUTOMATED     METABOLIC PANEL, BASIC    Collection Time: 03/03/17  4:00 AM   Result Value Ref Range    Sodium 138 136 - 145 mmol/L    Potassium 4.0 3.5 - 5.5 mmol/L    Chloride 98 (L) 100 - 108 mmol/L    CO2 29 21 - 32 mmol/L    Anion gap 11 3.0 - 18 mmol/L    Glucose 153 (H) 74 - 99 mg/dL    BUN 27 (H) 7.0 - 18 MG/DL    Creatinine 0.79 0.6 - 1.3 MG/DL    BUN/Creatinine ratio 34 (H) 12 - 20      GFR est AA >60 >60 ml/min/1.73m2    GFR est non-AA >60 >60 ml/min/1.73m2    Calcium 8.5 8.5 - 10.1 MG/DL   GLUCOSE, POC    Collection Time: 03/03/17 11:17 AM   Result Value Ref Range    Glucose (POC) 153 (H) 70 - 110 mg/dL   GLUCOSE, POC    Collection Time: 03/03/17 12:02 PM   Result Value Ref Range    Glucose (POC) 137 (H) 70 - 110 mg/dL             Telemetry:  ST    Imaging:  I have personally reviewed the patients radiographs and have reviewed the reports:      Results from Hospital Encounter encounter on 03/01/17   XR CHEST PORT   Narrative EXAM: Chest, portable upright, one view    INDICATION: Shortness of breath    COMPARISON: 3/1/2017    _______________    FINDINGS:    Cardiac silhouette and pulmonary vascularity are normal. The visible spiculated  mass left apex measures about 16 mm, contains 3 fiducials, without change. Interval improving streaky opacities in the lung bases. Lungs are hyperinflated. The right costophrenic angle is sharp, the left costophrenic angle is barely  incompletely included. No pneumothorax.    _______________         Impression IMPRESSION:    Hyperinflation. Improving, nearly resolved bibasilar atelectasis and/or  infiltrate. Left apical mass containing fiducials, without apparent change. Results from Hospital Encounter encounter on 03/01/17   CTA CHEST W WO CONT   Addendum Addendum:   Spiculated mass containing fiducials in the left upper lobe is difficult  to measure due to linear extension of atelectasis or scarring peripherally  from its lateral margin. It measures approximately 2.4 x 1.3 cm. In comparison with  a CT scan an outside institute dated 8/20/2016, the measurements are unchanged.       Val Zaman MD 3/3/2017  8:05 AM             Narrative EXAM: CTA Chest    INDICATION: Chest pain    COMPARISON: Single view chest done earlier today.    TECHNIQUE: Axial CT imaging from the thoracic inlet through the diaphragm with  intravenous contrast. Coronal and sagittal MIP reformats were generated. One or more dose reduction techniques were used on this CT: automated exposure  control, adjustment of the mAs and/or kVp according to patient's size, and  iterative reconstruction techniques. The specific techniques utilized on this CT  exam have been documented in the patient's electronic medical record.    _______________    FINDINGS:    EXAM QUALITY: Overall exam quality is adequate with good opacification of  pulmonary arteries. Mild respiratory artifact in the lung bases. PULMONARY ARTERIES: No evidence of pulmonary embolism. MEDIASTINUM: Heart size is normal. No evidence of right heart strain. No  pericardial effusion. There is left ventricular hypertrophy. Atherosclerotic  changes of aorta. Esophagus is unremarkable. LYMPH NODES: There are some nonspecific normal sized superior mediastinal lymph  nodes. No pathologically enlarged adenopathy. AIRWAY: Normal.    LUNGS: There is a spiculated masslike opacity in the left upper lobe with some  high density areas. This probably represent fiducial markers in the left upper  lobe mass. Fiducial markers were seen on chest x-ray done today but were new  compared to prior exams. There are bilateral patchy lower lobe infiltrates which  are also seen on chest x-ray today. These are new finding compared to prior  chest x-rays. There is a tree-in-bud appearance particularly in the right lower  lobe associated with the area of opacity. Patchy density inferior left upper  lobe could represent scarring or small focal acute infiltrate. Similar changes  are present in the right middle lobe No discrete pulmonary nodules other than  that described in the left upper lobe. Emphysematous changes with right upper  lobe scarring.     PLEURA: No effusion or pneumothorax    UPPER ABDOMEN: Multiple bilateral simple renal cysts. There is a 1 in the  superior medial right kidney on axial image 236 which is less obviously cystic. This may be secondary to its small size and volume averaging. This measures 15  mm. Hounsfield units are approximately 35 for this particular lesion. Upper  abdominal structures are otherwise unremarkable. OTHER: No acute or aggressive osseous abnormalities identified. _______________         Impression IMPRESSION:    1. No evidence of pulmonary embolism. 2.  Spiculated left upper lobe lung mass with fiducial markers present within it  consistent with lung carcinoma. 3. Bilateral lower lobe patchy infiltrates which are also seen to be new on  chest x-ray done today. Findings are consistent with either pneumonia or  aspiration. 4. COPD with emphysematous changes. 5. Patchy areas of opacity in the inferior left upper lobe and right middle lobe  which could represent scarring or acute areas of infiltrate. 6. Multiple bilateral renal cysts. At least one in the medial upper pole region  of the right kidney is indeterminate. Solid mass in this location cannot be  ruled out.         Catrachito Sams NP  Pulmonary Critical Care & Sleep Medicine   [address] [place] [address], [address] [place] [address]  [phone]

## 2017-03-03 NOTE — PROGRESS NOTES
0740 Pt received on Bipap @ 13/6, FiO2-30%. Aerosol rx given, pt tolerated passively. Sats-96%, HR-90, RR-19. Respiratory will continue to monitor. 1015 Pt taken off Bipap and placed on 3 lpm nasal cannula. Sats-97%, HR-99, RR-20. Respiratory will continue to monitor. 1450 Pt placed on Bipap @ previous settings.

## 2017-03-04 LAB
ANION GAP BLD CALC-SCNC: 10 MMOL/L (ref 3–18)
BACTERIA SPEC CULT: ABNORMAL
BASOPHILS # BLD AUTO: 0 K/UL (ref 0–0.06)
BASOPHILS # BLD: 0 % (ref 0–2)
BUN SERPL-MCNC: 28 MG/DL (ref 7–18)
BUN/CREAT SERPL: 37 (ref 12–20)
CALCIUM SERPL-MCNC: 8.4 MG/DL (ref 8.5–10.1)
CHLORIDE SERPL-SCNC: 102 MMOL/L (ref 100–108)
CO2 SERPL-SCNC: 29 MMOL/L (ref 21–32)
CREAT SERPL-MCNC: 0.76 MG/DL (ref 0.6–1.3)
DIFFERENTIAL METHOD BLD: ABNORMAL
EOSINOPHIL # BLD: 0 K/UL (ref 0–0.4)
EOSINOPHIL NFR BLD: 0 % (ref 0–5)
ERYTHROCYTE [DISTWIDTH] IN BLOOD BY AUTOMATED COUNT: 14.4 % (ref 11.6–14.5)
GLUCOSE BLD STRIP.AUTO-MCNC: 152 MG/DL (ref 70–110)
GLUCOSE BLD STRIP.AUTO-MCNC: 156 MG/DL (ref 70–110)
GLUCOSE BLD STRIP.AUTO-MCNC: 174 MG/DL (ref 70–110)
GLUCOSE SERPL-MCNC: 158 MG/DL (ref 74–99)
GRAM STN SPEC: ABNORMAL
GRAM STN SPEC: ABNORMAL
HCT VFR BLD AUTO: 35.9 % (ref 36–48)
HGB BLD-MCNC: 11.6 G/DL (ref 13–16)
LYMPHOCYTES # BLD AUTO: 8 % (ref 21–52)
LYMPHOCYTES # BLD: 1.2 K/UL (ref 0.9–3.6)
MCH RBC QN AUTO: 26.3 PG (ref 24–34)
MCHC RBC AUTO-ENTMCNC: 32.3 G/DL (ref 31–37)
MCV RBC AUTO: 81.4 FL (ref 74–97)
MONOCYTES # BLD: 0.4 K/UL (ref 0.05–1.2)
MONOCYTES NFR BLD AUTO: 3 % (ref 3–10)
NEUTS SEG # BLD: 13.5 K/UL (ref 1.8–8)
NEUTS SEG NFR BLD AUTO: 89 % (ref 40–73)
PLATELET # BLD AUTO: 381 K/UL (ref 135–420)
PMV BLD AUTO: 9.6 FL (ref 9.2–11.8)
POTASSIUM SERPL-SCNC: 4.1 MMOL/L (ref 3.5–5.5)
RBC # BLD AUTO: 4.41 M/UL (ref 4.7–5.5)
SERVICE CMNT-IMP: ABNORMAL
SODIUM SERPL-SCNC: 141 MMOL/L (ref 136–145)
WBC # BLD AUTO: 15.1 K/UL (ref 4.6–13.2)

## 2017-03-04 PROCEDURE — 85025 COMPLETE CBC W/AUTO DIFF WBC: CPT | Performed by: PHYSICIAN ASSISTANT

## 2017-03-04 PROCEDURE — 82962 GLUCOSE BLOOD TEST: CPT

## 2017-03-04 PROCEDURE — 74011250636 HC RX REV CODE- 250/636: Performed by: INTERNAL MEDICINE

## 2017-03-04 PROCEDURE — 94640 AIRWAY INHALATION TREATMENT: CPT

## 2017-03-04 PROCEDURE — 65660000001 HC RM ICU INTERMED STEPDOWN

## 2017-03-04 PROCEDURE — 94660 CPAP INITIATION&MGMT: CPT

## 2017-03-04 PROCEDURE — 74011000250 HC RX REV CODE- 250: Performed by: PHYSICIAN ASSISTANT

## 2017-03-04 PROCEDURE — 36415 COLL VENOUS BLD VENIPUNCTURE: CPT | Performed by: PHYSICIAN ASSISTANT

## 2017-03-04 PROCEDURE — 74011258636 HC RX REV CODE- 258/636: Performed by: INTERNAL MEDICINE

## 2017-03-04 PROCEDURE — 74011250637 HC RX REV CODE- 250/637: Performed by: INTERNAL MEDICINE

## 2017-03-04 PROCEDURE — 74011250636 HC RX REV CODE- 250/636: Performed by: PHYSICIAN ASSISTANT

## 2017-03-04 PROCEDURE — 74011250636 HC RX REV CODE- 250/636: Performed by: NURSE PRACTITIONER

## 2017-03-04 PROCEDURE — 80048 BASIC METABOLIC PNL TOTAL CA: CPT | Performed by: PHYSICIAN ASSISTANT

## 2017-03-04 PROCEDURE — 77030014143 HC TY PUNC LUMBR BD -A

## 2017-03-04 RX ORDER — LEVOFLOXACIN 750 MG/1
750 TABLET ORAL EVERY 24 HOURS
Status: DISCONTINUED | OUTPATIENT
Start: 2017-03-04 | End: 2017-03-06

## 2017-03-04 RX ORDER — PANTOPRAZOLE SODIUM 40 MG/1
40 TABLET, DELAYED RELEASE ORAL DAILY
Status: DISCONTINUED | OUTPATIENT
Start: 2017-03-05 | End: 2017-03-08 | Stop reason: HOSPADM

## 2017-03-04 RX ADMIN — IPRATROPIUM BROMIDE AND ALBUTEROL SULFATE 3 ML: .5; 3 SOLUTION RESPIRATORY (INHALATION) at 20:44

## 2017-03-04 RX ADMIN — ENOXAPARIN SODIUM 40 MG: 40 INJECTION SUBCUTANEOUS at 21:48

## 2017-03-04 RX ADMIN — Medication 10 ML: at 06:09

## 2017-03-04 RX ADMIN — SODIUM CHLORIDE, SODIUM LACTATE, POTASSIUM CHLORIDE, CALCIUM CHLORIDE, AND DEXTROSE MONOHYDRATE 50 ML/HR: 600; 310; 30; 20; 5 INJECTION, SOLUTION INTRAVENOUS at 09:41

## 2017-03-04 RX ADMIN — LEVOFLOXACIN 750 MG: 750 TABLET, FILM COATED ORAL at 18:02

## 2017-03-04 RX ADMIN — IPRATROPIUM BROMIDE AND ALBUTEROL SULFATE 3 ML: .5; 3 SOLUTION RESPIRATORY (INHALATION) at 08:16

## 2017-03-04 RX ADMIN — IPRATROPIUM BROMIDE AND ALBUTEROL SULFATE 3 ML: .5; 3 SOLUTION RESPIRATORY (INHALATION) at 01:39

## 2017-03-04 RX ADMIN — METHYLPREDNISOLONE SODIUM SUCCINATE 40 MG: 125 INJECTION, POWDER, FOR SOLUTION INTRAMUSCULAR; INTRAVENOUS at 21:47

## 2017-03-04 RX ADMIN — METHYLPREDNISOLONE SODIUM SUCCINATE 40 MG: 125 INJECTION, POWDER, FOR SOLUTION INTRAMUSCULAR; INTRAVENOUS at 06:08

## 2017-03-04 RX ADMIN — Medication 10 ML: at 22:00

## 2017-03-04 RX ADMIN — IPRATROPIUM BROMIDE AND ALBUTEROL SULFATE 3 ML: .5; 3 SOLUTION RESPIRATORY (INHALATION) at 14:10

## 2017-03-04 RX ADMIN — Medication 10 ML: at 14:00

## 2017-03-04 NOTE — PROGRESS NOTES
03/03/2017 2000 Assumed care of pt after bedside report with pt lying in bed with HOB elevated. Pt on BIPAP but wants off for awhile O2 applied at 3L/NC. Monitor denotes NSR. Lungs coarse and diminished. Pt coughs small amount of clear sputum. Abd soft, intact. Pt refuses Mepilex to sacrum/coccyx and SCD's. Voiding in [facility] as needed. Denies c/o pain or discomfort. 2130 Pt up to [facility] with assistance to void . Pt SOB with exertion. Pt requests back on BIPAP. RT Kelly at bedside to reapply BIPAP. Pt in no acute distress. 03/04/2017 0000 Resting at present without complaints. 0230 Pt off BIPAP and on O2 at 3 L/NC for break. Pt given water to drink. Tolerated without difficulty. 0300 Pt up to [facility] with assistance. Pt voided and had BM. 0345 Back to bed and placed back on BIPAP as requested. Pt in no acute distress. 0500 AM labs drawn and sent to lab. Pt denies c/o pain or discomfort. 0600 Pt requests to have BIPAP removed. BIPAP removed and pt placed on 3 L/NC. Tolerates without difficulty. 0630 Pt noted to have episode of dry nonproductive coughing. Tolerating without SOB. 0715 Up to [facility] with assist of nurse.

## 2017-03-04 NOTE — PROGRESS NOTES
[address] [place], [place] [address]  Hospitalist Division    Daily progress Note    Patient: Rachel Obregon MRN: [id number]  CSN: [id number]    YOB: 1935  Age: 81 y.o. Sex: male    DOA: 3/1/2017 LOS:  LOS: 3 days                    Subjective:     CC: Shortness of breath   Awake and alert  Breathing better  Requested to put back on BIPAP earlier this AM      Objective:      Visit Vitals    /87    Pulse 98    Temp 97.9 °F (36.6 °C)    Resp 19    Ht 5' 6\" (1.676 m)    Wt 67.1 kg (147 lb 14.9 oz)    SpO2 95%    BMI 23.88 kg/m2       Physical Exam:    NC/AT  NO JVD,TMG  S1/S2 RRR  DEC BS, NO WHEEZING  NT,ND, NABS  NO EDEMA  CN INTACT, MS EQUAL ALL 4 EXT        Intake and Output:  Current Shift:  03/04 0701 - 03/04 1900  In: 250 [I.V.:250]  Out: -   Last three shifts:  03/02 1901 - 03/04 0700  In: 3520 [P.O.:1120; I.V.:2400]  Out: 2125 [Urine:2125]    Recent Results (from the past 24 hour(s))   GLUCOSE, POC    Collection Time: 03/04/17 12:33 AM   Result Value Ref Range    Glucose (POC) 152 (H) 70 - 110 mg/dL   CBC WITH AUTOMATED DIFF    Collection Time: 03/04/17  5:00 AM   Result Value Ref Range    WBC 15.1 (H) 4.6 - 13.2 K/uL    RBC 4.41 (L) 4.70 - 5.50 M/uL    HGB 11.6 (L) 13.0 - 16.0 g/dL    HCT 35.9 (L) 36.0 - 48.0 %    MCV 81.4 74.0 - 97.0 FL    MCH 26.3 24.0 - 34.0 PG    MCHC 32.3 31.0 - 37.0 g/dL    RDW 14.4 11.6 - 14.5 %    PLATELET 419 418 - 654 K/uL    MPV 9.6 9.2 - 11.8 FL    NEUTROPHILS 89 (H) 40 - 73 %    LYMPHOCYTES 8 (L) 21 - 52 %    MONOCYTES 3 3 - 10 %    EOSINOPHILS 0 0 - 5 %    BASOPHILS 0 0 - 2 %    ABS. NEUTROPHILS 13.5 (H) 1.8 - 8.0 K/UL    ABS. LYMPHOCYTES 1.2 0.9 - 3.6 K/UL    ABS. MONOCYTES 0.4 0.05 - 1.2 K/UL    ABS. EOSINOPHILS 0.0 0.0 - 0.4 K/UL    ABS.  BASOPHILS 0.0 0.0 - 0.06 K/UL    DF AUTOMATED     METABOLIC PANEL, BASIC    Collection Time: 03/04/17  5:00 AM   Result Value Ref Range    Sodium 141 136 - 145 mmol/L    Potassium 4.1 3.5 - 5.5 mmol/L Chloride 102 100 - 108 mmol/L    CO2 29 21 - 32 mmol/L    Anion gap 10 3.0 - 18 mmol/L    Glucose 158 (H) 74 - 99 mg/dL    BUN 28 (H) 7.0 - 18 MG/DL    Creatinine 0.76 0.6 - 1.3 MG/DL    BUN/Creatinine ratio 37 (H) 12 - 20      GFR est AA >60 >60 ml/min/1.73m2    GFR est non-AA >60 >60 ml/min/1.73m2    Calcium 8.4 (L) 8.5 - 10.1 MG/DL   GLUCOSE, POC    Collection Time: 03/04/17  5:16 AM   Result Value Ref Range    Glucose (POC) 174 (H) 70 - 110 mg/dL   GLUCOSE, POC    Collection Time: 03/04/17 11:17 AM   Result Value Ref Range    Glucose (POC) 156 (H) 70 - 110 mg/dL         Current Facility-Administered Medications:     methylPREDNISolone (PF) (SOLU-MEDROL) injection 40 mg, 40 mg, IntraVENous, Q12H, Scooby Diallo, NP    dextrose 5% lactated ringers infusion, 50 mL/hr, IntraVENous, CONTINUOUS, Anatoly West MD, Last Rate: 50 mL/hr at 03/04/17 0941, 50 mL/hr at 03/04/17 0941    pantoprazole (PROTONIX) 40 mg in sodium chloride 0.9 % 10 mL injection, 40 mg, IntraVENous, Q24H, Scooby Diallo NP, 40 mg at 03/03/17 1809    sodium chloride (NS) flush 5-10 mL, 5-10 mL, IntraVENous, Q8H, Anthony Fus, DO, 10 mL at 03/04/17 0609    sodium chloride (NS) flush 5-10 mL, 5-10 mL, IntraVENous, PRN, Anthony Fus, DO, 10 mL at 03/01/17 2148    acetaminophen (TYLENOL) tablet 650 mg, 650 mg, Oral, Q4H PRN, Omelia Simpers, PA, 650 mg at 03/01/17 2238    ondansetron (ZOFRAN) injection 4 mg, 4 mg, IntraVENous, Q4H PRN, Omelia Simpers, PA    enoxaparin (LOVENOX) injection 40 mg, 40 mg, SubCUTAneous, Q24H, Omelia Simpers, PA, 40 mg at 03/03/17 2124    albuterol-ipratropium (DUO-NEB) 2.5 MG-0.5 MG/3 ML, 3 mL, Nebulization, Q6H RT, DEBORAH Rascon, 3 mL at 03/04/17 1410    aspirin delayed-release tablet 81 mg, 81 mg, Oral, DAILY, Roxanne Rasconma, Stopped at 03/03/17 0900    levoFLOXacin (LEVAQUIN) 750 mg in D5W IVPB, 750 mg, IntraVENous, Q24H, Anthony Martinez DO, Last Rate: 100 mL/hr at 03/03/17 1808, 750 mg at 03/03/17 1808    Lab Results   Component Value Date/Time    Glucose 158 03/04/2017 05:00 AM    Glucose 153 03/03/2017 04:00 AM    Glucose 154 03/02/2017 03:20 AM    Glucose 130 03/01/2017 02:00 PM        Assessment/Plan     Principal Problem:    Respiratory failure with hypoxia ([facility] [address] (3/1/2017)    Active Problems:    Lung mass (11/27/2012)      Renal mass (11/27/2012)      PAD (peripheral artery disease) ([facility] [address] (11/27/2012)      Pulmonary nodules (11/27/2012)      CAD (coronary artery disease) (11/27/2012)      HTN (hypertension) (11/27/2012)      Hyperlipidemia (11/27/2012)      BPH (benign prostatic hypertrophy) (11/27/2012)      Shortness of breath (3/3/2017)      - Acute on chronic hypoxic respiratory failure with respiratory distress , Continue BIPAP PRN  Appreciate  PCCM help   - Acute COPD exacerbation - on  Solumedrol, HHN and Abx  - Pneumonia - Continue Levaquin. Blood cultures from 3/1 +  strep pneumonia sens to levaquin. Repeat cultures negative thus far. - Lung mass with hx of Lung CA - s/p Neuroendocrine Carcinoma.  Heme/ Onc following.  - Palliative care following, pt was DNR in past but he has changed his status to full code.   Elizabeth Henderson MD  3/4/2017, 2:56 PM

## 2017-03-04 NOTE — PROGRESS NOTES
1700-Received pt resting in bed with eyes open, no sign of distress, vs stable on nasal cannula, A+Ox4, calm and cooperative, will continue to monitor

## 2017-03-04 NOTE — PROGRESS NOTES
0810-Called to bedside by RN--pt requesting to place bipap mask on  --I rec'd pt on 3 lpm NC--no distress observed--good sats & vitals but pt stating feels needs mask  --place bipap back on ipap=13  epap=6, Fio2=30%--pt stable when left bedside & resting    1215-Pt removed bipap--not in room at time but no distress observed or reported

## 2017-03-04 NOTE — ROUTINE PROCESS
0710 Bedside report given to Patricia Arndt (oncoming nurse) per Raisa Del Real RN (offgoing nurse) utilizing SBAR, MAR. Kardex, I&O, results review, and EKG interpretation with rate and rhythm.

## 2017-03-04 NOTE — PROGRESS NOTES
[facility]: This patient meets P & T approved criteria for conversion from IV to oral therapy for the following medication:     Levofloxacin 750 mg IV q24h was discontinued and Levofloxacin 750 mg PO q24h was ordered. Pantoprazole 40 mg IV q24h for SUP was discontinued and Pantoprazole 40 mg tab PO daily was ordered. If the patient no longer meets all criteria for oral therapy, the pharmacist will switch back to IV therapy. Thanks.

## 2017-03-04 NOTE — PROGRESS NOTES
[facility]  Pulmonary, Critical Care, and Sleep Medicine    Name: Tyson Angeles MRN: [id number]   : 1935 Hospital: [address] [place] [address]   Date: 3/4/2017        Critical Care Progress Note    IMPRESSION:   · Acute on chronic hypoxemic resp failure likely multifactorial: infection/PNA, COPD exacerbation and possible mets w/hx CA of lung and other causes. On BIPAP  · COPD exacerbation, on home O2 baseline  · Hx Lung Cancer/lung mass  · Hx CAD-on Aspirin, Stress Echo 3/25-no ischemia, Nuclear stress test (3/30/2007) low probability of ischemia. · Hx HTN, BPH, OA, PAD. PLAN:     RS: Continue BIPAP, keep O2 sats >90%, O2/NC PRN rest in between BIPAP. Continue bronchodilators, steroid-taper when clinically indicated, aspiration precautions, HOB 30 degrees and bronchial hygiene. CTA chest 3/1-no PE. ABG PRN/or change condition. Repeat CXR 3/3 reviewed-mildly improved  CVS: BNP ok (235) Troponin-ok  (235). Remain tachy w/HR low 100's, BP better today, will not restart hydralazine/HCTZ listed on home med's. Echo 10/4/2010 reviewed, showed EF 65%, nwa. Repeat Echo 3/2/17 reviewed EF 55-60%, nwa G1DD,  ID: Ab- Vanco/Levaquin, blood cultures X 1 (3/1) prelim result growing GPC in pairs chains/strep pneumonia. Sputum cx; prelim. Growing GPC/GNR-final result pending, Repeat blood cx (3/2) NGTD. Leukocytosis trending up but currently afebrile.  Trend temp and wbc, sepsis bundle per protocol  ENDO:  Monitor glucose,   GI: PPI   METABOLIC:   Monitor lytes, replaced as needed   RENAL:  Monitor renal/creat  CNS:  Monitor mentation   HEMATOLOGY: Monitor H/H and platelets  MUSCULOSKELETAL: Moves all extremities   PAIN AND SEDATION:  PRN  NUTRITION: NPO while on BIPAP, D5 LR 50 ml/hr  ADVANCE DIRECTIVE:  Full code, Palliative following  FAMILY DISCUSSION: No family at bedside  Lines:  PIV    Further recommendations will be based on the patient's response to recommended treatment and results of the investigation ordered. Quality Care: PPI, DVT prophylaxis, HOB elevated, Infection control all reviewed and addressed. Events and notes from last 24 hours reviewed. Care plan discussed with nursing         Subjective/History: This patient has been seen and evaluated at the request of Mary James (PA-Hospitalist) for Acute respiratory failure/COPD exacerbation/PNA. HPI obtained mostly from records reviewed,  Pt currently on BIPAP. Patient is a 81 y.o. male PMHx: Lung Cancer, HTN, BPH, COPD and arthritis presented to ED via EMS due to SOB after walking in the parking lot and nonproductive cough. In ED placed on BIPAP with improvement, had ABG and CXR. Transfer to ICU stepdown for further monitoring. 3/2/17:  Pt currently on BIPAP with sats 98% on bedside monitoring , he report sleepy, response appropriately and follow commands. 3/3/17:   Pt remain on BIPAP with sats 98%. No accessory muscle use noted. Per RN pt able to be off BIPAP for 2 hrs-had sips of liquid, still sleepy this am, able to response simple question by nodding head,follows commands, he did not like taking off blanket for assessment, no distress noted. Glucose elevated, continue to monitor, no hx DM  CXR today better  Elevated BP/Tachy will add low dose Cardizem (short acting in divided dose) po if able to wean off BIPAP, otherwise monitor for now. 3/4/17:  Pt awake and alert on BIPAP with sats 98% on bedside monitoring, pleasant and appear more conversant today but requested to continue BIPAP for now, no accessory muscle use or respiratory distress noted. He report no SOB, no chest pain, cough or hemoptysis. He report on off Prednisone use OP, last px was last summer. He had 6 XRT for lung cancer 2013 dx, He independent for ADL's and stationary bikes-last wk for 3 hrs without SOB. He use Home O2/NC as baseline, neb/MDI, not on home CPAP, he follows OP only PCP, no pulmonologist seen since radiation tx.    WBC trending up but currently afebrile. Hb stable    Medication Review:  · Pressors - none  · Sedation - none  · Antibiotics - vancomycin, levaquin  · Pain - none  · GI/ DVT - protonix  · Others (other gtts)D5LR 50 ml/hr. Past Medical History:   Diagnosis Date    Arthritis     Arthropathy, unspecified, site unspecified     Asthma     BPH (benign prostatic hyperplasia)     COPD (chronic obstructive pulmonary disease) ([facility] [address]     Hypertension     Microscopic hematuria       Past Surgical History:   Procedure Laterality Date    HX AAA REPAIR      HX COLONOSCOPY  2002    HX HERNIA REPAIR  2/6/2004    HX OTHER SURGICAL      Biopsy Prostate    HX OTHER SURGICAL  1/10/2008    HX VASCULAR ACCESS        Prior to Admission medications    Medication Sig Start Date End Date Taking? Authorizing Provider   loratadine (CLARITIN) 10 mg tablet Take 10 mg by mouth daily. Yes Historical Provider   GUAIFENESIN/CODEINE PHOSPHATE (ROBITUSSIN A-C PO) Take  by mouth. Historical Provider   predniSONE (DELTASONE) 10 mg tablet Take  by mouth daily (with breakfast). Historical Provider   hydralazine-hydrochlorothiazid 25-25 mg cap Take  by mouth. Yes Historical Provider   aspirin delayed-release (ASPIRIN LOW DOSE) 81 mg tablet Take  by mouth daily. Yes Historical Provider   lovastatin (MEVACOR) 40 mg tablet Take 40 mg by mouth nightly. Yes Historical Provider   ALPRAZolam (XANAX) 0.25 mg tablet Take  by mouth. Yes Historical Provider   ipratropium-albuterol (COMBIVENT)  mcg/actuation inhaler Take  by inhalation every six (6) hours as needed. Historical Provider   therapeutic multivitamin (THERAGRAN) tablet Take 1 Tab by mouth daily. Yes Historical Provider   dutaseride (AVODART) 0.5 mg capsule Take 0.5 mg by mouth daily. Yes Historical Provider   fluticasone-salmeterol (ADVAIR DISKUS) 500-50 mcg/dose diskus inhaler Take 1 Puff by inhalation every twelve (12) hours.      Yes Historical Provider albuterol sulfate (PROVENTIL;VENTOLIN) 2.5 mg/0.5 mL Nebu 2.5 mg by Nebulization route once. Yes Historical Provider   tiotropium (SPIRIVA WITH HANDIHALER) 18 mcg inhalation capsule Take 1 Cap by inhalation daily. Yes Historical Provider   albuterol (PROVENTIL HFA, VENTOLIN HFA) 90 mcg/actuation inhaler Take  by inhalation.      Yes Historical Provider     Current Facility-Administered Medications   Medication Dose Route Frequency    dextrose 5% lactated ringers infusion  50 mL/hr IntraVENous CONTINUOUS    methylPREDNISolone (PF) (SOLU-MEDROL) injection 40 mg  40 mg IntraVENous Q8H    VANCOMYCIN INFORMATION NOTE   Other ONCE    pantoprazole (PROTONIX) 40 mg in sodium chloride 0.9 % 10 mL injection  40 mg IntraVENous Q24H    sodium chloride (NS) flush 5-10 mL  5-10 mL IntraVENous Q8H    enoxaparin (LOVENOX) injection 40 mg  40 mg SubCUTAneous Q24H    albuterol-ipratropium (DUO-NEB) 2.5 MG-0.5 MG/3 ML  3 mL Nebulization Q6H RT    aspirin delayed-release tablet 81 mg  81 mg Oral DAILY    levoFLOXacin (LEVAQUIN) 750 mg in D5W IVPB  750 mg IntraVENous Q24H     No Known Allergies   Social History   Substance Use Topics    Smoking status: Former Smoker     Packs/day: 0.30     Years: 55.00     Types: Cigarettes     Quit date: 2004    Smokeless tobacco: Not on file    Alcohol use No      Family History   Problem Relation Age of Onset    Asthma Father         Review of Systems:   Unable to obtain, pt on BIPAP      Objective:   Vital Signs:    Visit Vitals    /77    Pulse 96    Temp 98.6 °F (37 °C)    Resp 19    Ht 5' 6\" (1.676 m)    Wt 67.1 kg (147 lb 14.9 oz)    SpO2 97%    BMI 23.88 kg/m2       O2 Device: BIPAP   O2 Flow Rate (L/min): 3 l/min   Temp (24hrs), Av.2 °F (36.8 °C), Min:97.6 °F (36.4 °C), Max:98.6 °F (37 °C)       Intake/Output:   Last shift:       07 -  1900  In: 100 [I.V.:100]  Out: -   Last 3 shifts: 1901 -  0700  In: 2057 [P.O.:1120; I.V.:2400]  Out: 2125 [Urine:2125]    Intake/Output Summary (Last 24 hours) at 03/04/17 0947  Last data filed at 03/04/17 0900   Gross per 24 hour   Intake             2395 ml   Output             1250 ml   Net             1145 ml       Physical Exam:  General:   On BIPAP, sleepy but arousable, response appropriately and following commands.    Head:   Normocephalic, without obvious abnormality, atraumatic.    Eyes:   Conjunctivae/corneas clear. PERRL, EOMs intact.    Nose:  Nares normal. Septum midline. Mucosa normal. No drainage or sinus tenderness.    Throat:  Lips, mucosa normal.    Neck:  Supple, symmetrical, trachea midline.    Back:   Symmetric, no curvature. ROM normal.    Lungs:   Coarse breath sounds, no wheezing on auscultation bilaterally.    Chest wall:   No tenderness or deformity.    Heart:   Tachy, Regular rate and rhythm,  no murmur   Abdomen:   Soft, non-tender. Bowel sounds normal. No masses, No organomegaly.    Extremities:  Extremities normal, atraumatic, no cyanosis or edema.    Pulses:  2+ and symmetric all extremities.    Skin:  Skin color, texture, turgor normal.    Lymph nodes:        Cervical, supraclavicular, and axillary nodes normal.    Neurologic:  Grossly nonfocal          Data:     Recent Results (from the past 12 hour(s))   GLUCOSE, POC    Collection Time: 03/04/17 12:33 AM   Result Value Ref Range    Glucose (POC) 152 (H) 70 - 110 mg/dL   CBC WITH AUTOMATED DIFF    Collection Time: 03/04/17  5:00 AM   Result Value Ref Range    WBC 15.1 (H) 4.6 - 13.2 K/uL    RBC 4.41 (L) 4.70 - 5.50 M/uL    HGB 11.6 (L) 13.0 - 16.0 g/dL    HCT 35.9 (L) 36.0 - 48.0 %    MCV 81.4 74.0 - 97.0 FL    MCH 26.3 24.0 - 34.0 PG    MCHC 32.3 31.0 - 37.0 g/dL    RDW 14.4 11.6 - 14.5 %    PLATELET 561 316 - 715 K/uL    MPV 9.6 9.2 - 11.8 FL    NEUTROPHILS 89 (H) 40 - 73 %    LYMPHOCYTES 8 (L) 21 - 52 %    MONOCYTES 3 3 - 10 %    EOSINOPHILS 0 0 - 5 %    BASOPHILS 0 0 - 2 %    ABS.  NEUTROPHILS 13.5 (H) 1.8 - 8.0 K/UL    ABS. LYMPHOCYTES 1.2 0.9 - 3.6 K/UL    ABS. MONOCYTES 0.4 0.05 - 1.2 K/UL    ABS. EOSINOPHILS 0.0 0.0 - 0.4 K/UL    ABS. BASOPHILS 0.0 0.0 - 0.06 K/UL    DF AUTOMATED     METABOLIC PANEL, BASIC    Collection Time: 03/04/17  5:00 AM   Result Value Ref Range    Sodium 141 136 - 145 mmol/L    Potassium 4.1 3.5 - 5.5 mmol/L    Chloride 102 100 - 108 mmol/L    CO2 29 21 - 32 mmol/L    Anion gap 10 3.0 - 18 mmol/L    Glucose 158 (H) 74 - 99 mg/dL    BUN 28 (H) 7.0 - 18 MG/DL    Creatinine 0.76 0.6 - 1.3 MG/DL    BUN/Creatinine ratio 37 (H) 12 - 20      GFR est AA >60 >60 ml/min/1.73m2    GFR est non-AA >60 >60 ml/min/1.73m2    Calcium 8.4 (L) 8.5 - 10.1 MG/DL   GLUCOSE, POC    Collection Time: 03/04/17  5:16 AM   Result Value Ref Range    Glucose (POC) 174 (H) 70 - 110 mg/dL             Telemetry:  ST    Imaging:  I have personally reviewed the patients radiographs and have reviewed the reports:      Results from Hospital Encounter encounter on 03/01/17   XR CHEST PORT   Narrative EXAM: Chest, portable upright, one view    INDICATION: Shortness of breath    COMPARISON: 3/1/2017    _______________    FINDINGS:    Cardiac silhouette and pulmonary vascularity are normal. The visible spiculated  mass left apex measures about 16 mm, contains 3 fiducials, without change. Interval improving streaky opacities in the lung bases. Lungs are hyperinflated. The right costophrenic angle is sharp, the left costophrenic angle is barely  incompletely included. No pneumothorax.    _______________         Impression IMPRESSION:    Hyperinflation. Improving, nearly resolved bibasilar atelectasis and/or  infiltrate. Left apical mass containing fiducials, without apparent change.             Results from Hospital Encounter encounter on 03/01/17   CTA CHEST W WO CONT   Addendum Addendum:   Spiculated mass containing fiducials in the left upper lobe is difficult  to measure due to linear extension of atelectasis or scarring peripherally  from its lateral margin. It measures approximately 2.4 x 1.3 cm. In comparison with  a CT scan an outside institute dated 8/20/2016, the measurements are unchanged. Georgina Hinton MD 3/3/2017  8:05 AM             Narrative EXAM: CTA Chest    INDICATION: Chest pain    COMPARISON: Single view chest done earlier today. TECHNIQUE: Axial CT imaging from the thoracic inlet through the diaphragm with  intravenous contrast. Coronal and sagittal MIP reformats were generated. One or more dose reduction techniques were used on this CT: automated exposure  control, adjustment of the mAs and/or kVp according to patient's size, and  iterative reconstruction techniques. The specific techniques utilized on this CT  exam have been documented in the patient's electronic medical record.    _______________    FINDINGS:    EXAM QUALITY: Overall exam quality is adequate with good opacification of  pulmonary arteries. Mild respiratory artifact in the lung bases. PULMONARY ARTERIES: No evidence of pulmonary embolism. MEDIASTINUM: Heart size is normal. No evidence of right heart strain. No  pericardial effusion. There is left ventricular hypertrophy. Atherosclerotic  changes of aorta. Esophagus is unremarkable. LYMPH NODES: There are some nonspecific normal sized superior mediastinal lymph  nodes. No pathologically enlarged adenopathy. AIRWAY: Normal.    LUNGS: There is a spiculated masslike opacity in the left upper lobe with some  high density areas. This probably represent fiducial markers in the left upper  lobe mass. Fiducial markers were seen on chest x-ray done today but were new  compared to prior exams. There are bilateral patchy lower lobe infiltrates which  are also seen on chest x-ray today. These are new finding compared to prior  chest x-rays. There is a tree-in-bud appearance particularly in the right lower  lobe associated with the area of opacity.  Patchy density inferior left upper  lobe could represent scarring or small focal acute infiltrate. Similar changes  are present in the right middle lobe No discrete pulmonary nodules other than  that described in the left upper lobe. Emphysematous changes with right upper  lobe scarring. PLEURA: No effusion or pneumothorax    UPPER ABDOMEN: Multiple bilateral simple renal cysts. There is a 1 in the  superior medial right kidney on axial image 236 which is less obviously cystic. This may be secondary to its small size and volume averaging. This measures 15  mm. Hounsfield units are approximately 35 for this particular lesion. Upper  abdominal structures are otherwise unremarkable. OTHER: No acute or aggressive osseous abnormalities identified. _______________         Impression IMPRESSION:    1. No evidence of pulmonary embolism. 2.  Spiculated left upper lobe lung mass with fiducial markers present within it  consistent with lung carcinoma. 3. Bilateral lower lobe patchy infiltrates which are also seen to be new on  chest x-ray done today. Findings are consistent with either pneumonia or  aspiration. 4. COPD with emphysematous changes. 5. Patchy areas of opacity in the inferior left upper lobe and right middle lobe  which could represent scarring or acute areas of infiltrate. 6. Multiple bilateral renal cysts. At least one in the medial upper pole region  of the right kidney is indeterminate. Solid mass in this location cannot be  ruled out.         Ivania Chen NP  Pulmonary Critical Care & Sleep Medicine   [address] [place] [address], [address] [place] [address]  [phone]

## 2017-03-04 NOTE — PROGRESS NOTES
0715 .Bedside and Verbal shift change report given to Patricia Arndt (oncoming nurse) by Anuja Mcelroy (offgoing nurse). Report included the following information SBAR, Kardex and MAR.     0815 Bipap administered as per patients request    1145 Patient requested to be taken off bipap  Explained to patient he can not eat while he is on bipap patient agreed to stay on Nasal cannula for remainder of the day  1200 Patient placed on cardiac diet    1630 . Bedside and Verbal shift change report given to Mavis Harrison (oncoming nurse) by Patricia Arndt (offgoing nurse). Report included the following information .

## 2017-03-05 LAB
ANION GAP BLD CALC-SCNC: 12 MMOL/L (ref 3–18)
BASOPHILS # BLD AUTO: 0 K/UL (ref 0–0.06)
BASOPHILS # BLD: 0 % (ref 0–2)
BUN SERPL-MCNC: 23 MG/DL (ref 7–18)
BUN/CREAT SERPL: 29 (ref 12–20)
CALCIUM SERPL-MCNC: 8.7 MG/DL (ref 8.5–10.1)
CHLORIDE SERPL-SCNC: 101 MMOL/L (ref 100–108)
CO2 SERPL-SCNC: 28 MMOL/L (ref 21–32)
CREAT SERPL-MCNC: 0.8 MG/DL (ref 0.6–1.3)
DIFFERENTIAL METHOD BLD: ABNORMAL
EOSINOPHIL # BLD: 0 K/UL (ref 0–0.4)
EOSINOPHIL NFR BLD: 0 % (ref 0–5)
ERYTHROCYTE [DISTWIDTH] IN BLOOD BY AUTOMATED COUNT: 14.5 % (ref 11.6–14.5)
GLUCOSE SERPL-MCNC: 141 MG/DL (ref 74–99)
HCT VFR BLD AUTO: 40.6 % (ref 36–48)
HGB BLD-MCNC: 12.9 G/DL (ref 13–16)
LYMPHOCYTES # BLD AUTO: 5 % (ref 21–52)
LYMPHOCYTES # BLD: 0.8 K/UL (ref 0.9–3.6)
MCH RBC QN AUTO: 26.2 PG (ref 24–34)
MCHC RBC AUTO-ENTMCNC: 31.8 G/DL (ref 31–37)
MCV RBC AUTO: 82.4 FL (ref 74–97)
MONOCYTES # BLD: 0.9 K/UL (ref 0.05–1.2)
MONOCYTES NFR BLD AUTO: 6 % (ref 3–10)
NEUTS SEG # BLD: 14.2 K/UL (ref 1.8–8)
NEUTS SEG NFR BLD AUTO: 89 % (ref 40–73)
PLATELET # BLD AUTO: 482 K/UL (ref 135–420)
PMV BLD AUTO: 9.9 FL (ref 9.2–11.8)
POTASSIUM SERPL-SCNC: 4.3 MMOL/L (ref 3.5–5.5)
RBC # BLD AUTO: 4.93 M/UL (ref 4.7–5.5)
SODIUM SERPL-SCNC: 141 MMOL/L (ref 136–145)
WBC # BLD AUTO: 15.9 K/UL (ref 4.6–13.2)

## 2017-03-05 PROCEDURE — 80048 BASIC METABOLIC PNL TOTAL CA: CPT | Performed by: PHYSICIAN ASSISTANT

## 2017-03-05 PROCEDURE — 74011250637 HC RX REV CODE- 250/637: Performed by: FAMILY MEDICINE

## 2017-03-05 PROCEDURE — 74011250637 HC RX REV CODE- 250/637: Performed by: PHYSICIAN ASSISTANT

## 2017-03-05 PROCEDURE — 85025 COMPLETE CBC W/AUTO DIFF WBC: CPT | Performed by: PHYSICIAN ASSISTANT

## 2017-03-05 PROCEDURE — 74011250637 HC RX REV CODE- 250/637: Performed by: INTERNAL MEDICINE

## 2017-03-05 PROCEDURE — 65660000000 HC RM CCU STEPDOWN

## 2017-03-05 PROCEDURE — 94640 AIRWAY INHALATION TREATMENT: CPT

## 2017-03-05 PROCEDURE — 74011250636 HC RX REV CODE- 250/636: Performed by: NURSE PRACTITIONER

## 2017-03-05 PROCEDURE — 36415 COLL VENOUS BLD VENIPUNCTURE: CPT | Performed by: PHYSICIAN ASSISTANT

## 2017-03-05 PROCEDURE — 74011000250 HC RX REV CODE- 250: Performed by: PHYSICIAN ASSISTANT

## 2017-03-05 PROCEDURE — 74011250636 HC RX REV CODE- 250/636: Performed by: PHYSICIAN ASSISTANT

## 2017-03-05 PROCEDURE — 74011000250 HC RX REV CODE- 250: Performed by: INTERNAL MEDICINE

## 2017-03-05 PROCEDURE — 74011250637 HC RX REV CODE- 250/637: Performed by: NURSE PRACTITIONER

## 2017-03-05 RX ORDER — ARFORMOTEROL TARTRATE 15 UG/2ML
15 SOLUTION RESPIRATORY (INHALATION)
Status: DISCONTINUED | OUTPATIENT
Start: 2017-03-05 | End: 2017-03-08 | Stop reason: HOSPADM

## 2017-03-05 RX ORDER — DILTIAZEM HYDROCHLORIDE 30 MG/1
30 TABLET, FILM COATED ORAL EVERY 8 HOURS
Status: DISCONTINUED | OUTPATIENT
Start: 2017-03-05 | End: 2017-03-05

## 2017-03-05 RX ORDER — HYDRALAZINE HYDROCHLORIDE 25 MG/1
25 TABLET, FILM COATED ORAL
Status: DISCONTINUED | OUTPATIENT
Start: 2017-03-05 | End: 2017-03-08 | Stop reason: HOSPADM

## 2017-03-05 RX ORDER — DILTIAZEM HYDROCHLORIDE 30 MG/1
30 TABLET, FILM COATED ORAL EVERY 6 HOURS
Status: DISCONTINUED | OUTPATIENT
Start: 2017-03-05 | End: 2017-03-08 | Stop reason: HOSPADM

## 2017-03-05 RX ORDER — HYDROCHLOROTHIAZIDE 25 MG/1
25 TABLET ORAL DAILY
Status: DISCONTINUED | OUTPATIENT
Start: 2017-03-05 | End: 2017-03-08 | Stop reason: HOSPADM

## 2017-03-05 RX ORDER — HYDRALAZINE HYDROCHLORIDE 25 MG/1
25 TABLET, FILM COATED ORAL
Status: DISCONTINUED | OUTPATIENT
Start: 2017-03-05 | End: 2017-03-05 | Stop reason: SDUPTHER

## 2017-03-05 RX ORDER — BUDESONIDE 0.5 MG/2ML
500 INHALANT ORAL
Status: DISCONTINUED | OUTPATIENT
Start: 2017-03-05 | End: 2017-03-08 | Stop reason: HOSPADM

## 2017-03-05 RX ADMIN — DILTIAZEM HYDROCHLORIDE 30 MG: 30 TABLET, FILM COATED ORAL at 09:36

## 2017-03-05 RX ADMIN — IPRATROPIUM BROMIDE AND ALBUTEROL SULFATE 3 ML: .5; 3 SOLUTION RESPIRATORY (INHALATION) at 20:09

## 2017-03-05 RX ADMIN — IPRATROPIUM BROMIDE AND ALBUTEROL SULFATE 3 ML: .5; 3 SOLUTION RESPIRATORY (INHALATION) at 11:45

## 2017-03-05 RX ADMIN — SALINE NASAL SPRAY 2 SPRAY: 1.5 SOLUTION NASAL at 09:35

## 2017-03-05 RX ADMIN — Medication 10 ML: at 14:00

## 2017-03-05 RX ADMIN — ENOXAPARIN SODIUM 40 MG: 40 INJECTION SUBCUTANEOUS at 20:52

## 2017-03-05 RX ADMIN — PANTOPRAZOLE SODIUM 40 MG: 40 TABLET, DELAYED RELEASE ORAL at 17:25

## 2017-03-05 RX ADMIN — HYDRALAZINE HYDROCHLORIDE 25 MG: 25 TABLET ORAL at 06:04

## 2017-03-05 RX ADMIN — ARFORMOTEROL TARTRATE 15 MCG: 15 SOLUTION RESPIRATORY (INHALATION) at 20:09

## 2017-03-05 RX ADMIN — ASPIRIN 81 MG: 81 TABLET, COATED ORAL at 08:50

## 2017-03-05 RX ADMIN — BUDESONIDE 500 MCG: 0.5 INHALANT RESPIRATORY (INHALATION) at 20:09

## 2017-03-05 RX ADMIN — IPRATROPIUM BROMIDE AND ALBUTEROL SULFATE 3 ML: .5; 3 SOLUTION RESPIRATORY (INHALATION) at 01:24

## 2017-03-05 RX ADMIN — METHYLPREDNISOLONE SODIUM SUCCINATE 40 MG: 125 INJECTION, POWDER, FOR SOLUTION INTRAMUSCULAR; INTRAVENOUS at 20:52

## 2017-03-05 RX ADMIN — Medication 10 ML: at 22:00

## 2017-03-05 RX ADMIN — HYDROCHLOROTHIAZIDE 25 MG: 25 TABLET ORAL at 08:50

## 2017-03-05 RX ADMIN — DILTIAZEM HYDROCHLORIDE 30 MG: 30 TABLET, FILM COATED ORAL at 17:25

## 2017-03-05 RX ADMIN — METHYLPREDNISOLONE SODIUM SUCCINATE 40 MG: 125 INJECTION, POWDER, FOR SOLUTION INTRAMUSCULAR; INTRAVENOUS at 08:53

## 2017-03-05 RX ADMIN — LEVOFLOXACIN 750 MG: 750 TABLET, FILM COATED ORAL at 17:25

## 2017-03-05 NOTE — ROUTINE PROCESS
Bedside, Verbal and Written shift change report given to KENDY Henry (oncoming nurse) by CARMELO Jordan RN (offgoing nurse). Report included the following information SBAR, Kardex, Intake/Output and Recent Results. Assumed care of pt sitting on bedside commode, a/o x 4, following commands, denies pain, remains on telemetry, 3L NC, IV fluids. 2200  Pt status unchanged, sitting up in bed watching TV, appears asymptomatic, scheduled med's given. 0000  Pt reassessed, status unchanged, assisted to bedside commode and back to bed, pt tolerated well. 0200  Pt sleeping at this time, appears asymptomatic, RT at bedside, pt SpO2 89% on 1L NC, increase to 2L NC, Sat's now 94%. 0400  Pt assisted ob to bedside commode and back to bed, pt becomes sob with excertion. Pt working to catch breath but able to talk, sat's 89-90%. 0600  Pt status unchanged, sitting up in bed watching TV, remains on 2L NC at this time. 0720  Pt SBP > 180, PRN oral Hydralazine given. 0715  Bedside, Verbal and Written shift change report given to [facility] (oncoming nurse) by KENDY Henry (offgoing nurse). Report included the following information SBAR, Kardex, Intake/Output and Recent Results.

## 2017-03-05 NOTE — PROGRESS NOTES
[facility]  Pulmonary, Critical Care, and Sleep Medicine    Name: Brady Rich MRN: [id number]   : 1935 Hospital: [facility]   Date: 3/5/2017        Critical Care Progress Note    IMPRESSION:   · Acute on chronic hypoxemic resp failure likely multifactorial: infection/PNA, COPD exacerbation and possible mets w/hx CA of lung and other causes. On BIPAP  · COPD exacerbation, on home O2 baseline  · Hx Lung Cancer/lung mass  · Hx CAD-on Aspirin, Stress Echo 3/25-no ischemia, Nuclear stress test (3/30/2007) low probability of ischemia. · Hx HTN, BPH, OA, PAD. PLAN:     RS: Continue O2/NC, keep O2 sats >90%,  On home O2/NC as baseline. BIPAP PRN. Continue bronchodilators, steroid-taper when clinically indicated, aspiration precautions, HOB 30 degrees and bronchial hygiene. CTA chest 3/1-no PE. ABG PRN/or change condition. Repeat CXR 3/3 reviewed-mildly improved. Periodic CXR to assess clearing. CVS: BNP ok (235) Troponin-ok  (235). Remain tachy w/HR low 100's, BP better today, restart HCTZ listed on home med's and  Added Cardizem 30 mg Q8hr BP remain elevated, on PRN hydralazine for BP>160. Echo 10/4/2010 reviewed, showed EF 65%, nwa. Repeat Echo 3/2/17 reviewed EF 55-60%, nwa G1DD,  ID: Ab- Levaquin po, primary d/c Vanco 3/4, blood cultures X 1 (3/1) prelim result growing GPC in pairs chains/strep pneumonia. Sputum cx; prelim. Growing GPC/GNR-final result pending, Repeat blood cx (3/2) NGTD. Leukocytosis trending up but currently afebrile.  Trend temp and wbc, sepsis bundle per protocol  ENDO:  Monitor glucose,   GI: PPI   METABOLIC:   Monitor lytes, replaced as needed   RENAL:  Monitor renal/creat  CNS:  Monitor mentation   HEMATOLOGY: Monitor H/H and platelets  MUSCULOSKELETAL: Moves all extremities   PAIN AND SEDATION:  PRN  NUTRITION: Cardiac diet,, D5 LR 50 ml/hr  ADVANCE DIRECTIVE:  Full code, Palliative following  FAMILY DISCUSSION: No family at bedside  Lines:  PIV      Quality Care: PPI, DVT prophylaxis, HOB elevated, Infection control all reviewed and addressed. Events and notes from last 24 hours reviewed. Care plan discussed with nursing         Subjective/History: This patient has been seen and evaluated at the request of Merna Flores ([place]-Hospitalist) for Acute respiratory failure/COPD exacerbation/PNA. HPI obtained mostly from records reviewed,  Pt currently on BIPAP. Patient is a 81 y.o. male PMHx: Lung Cancer, HTN, BPH, COPD and arthritis presented to ED via EMS due to SOB after walking in the parking lot and nonproductive cough. In ED placed on BIPAP with improvement, had ABG and CXR. Transfer to ICU stepdown for further monitoring. 3/2/17:  Pt currently on BIPAP with sats 98% on bedside monitoring , he report sleepy, response appropriately and follow commands. 3/3/17:   Pt remain on BIPAP with sats 98%. No accessory muscle use noted. Per RN pt able to be off BIPAP for 2 hrs-had sips of liquid, still sleepy this am, able to response simple question by nodding head,follows commands, he did not like taking off blanket for assessment, no distress noted. Glucose elevated, continue to monitor, no hx DM  CXR today better  Elevated BP/Tachy will add low dose Cardizem (short acting in divided dose) po if able to wean off BIPAP, otherwise monitor for now. 3/4/17:  Pt awake and alert on BIPAP with sats 98% on bedside monitoring, pleasant and appear more conversant today but requested to continue BIPAP for now, no accessory muscle use or respiratory distress noted. He report no SOB, no chest pain, cough or hemoptysis. He report on off Prednisone use OP, last px was last summer. He had 6 XRT for lung cancer 2013 dx, He independent for ADL's and stationary bikes-last wk for 3 hrs without SOB. He use Home O2/NC as baseline, neb/MDI, not on home CPAP, he follows OP only PCP, no pulmonologist seen since radiation tx.    WBC trending up but currently afebrile. Hb stable    3/5/17:  Pt awake and alert, on O2/NC @1 Lpm with sats 97%on bedside monitoring. He report did not use BIPAP and did well on cont O2/NC. He denies SOB chest pain, report coughing with productive cough with white mucus, no hemoptysis  BP persistently  Elevated, home HCTZ 25 mg every day started, will add low dose Cardizem 30 mg Q8hrs and continue hydralazine PRN for SBP>160  WBC trending up-on Levaquin 750 mg po, Vanco d/c yesterday by primary. Follow cultures  Afebrile. Medication Review:  · Pressors - none  · Sedation - none  · Antibiotics - Levaquin  · Pain - none  · GI/ DVT - Protonix/Lovenox  · Others (other gtts)D5LR 50 ml/hr. Past Medical History:   Diagnosis Date    Arthritis     Arthropathy, unspecified, site unspecified     Asthma     BPH (benign prostatic hyperplasia)     COPD (chronic obstructive pulmonary disease) ([facility] [address]     Hypertension     Microscopic hematuria       Past Surgical History:   Procedure Laterality Date    HX AAA REPAIR      HX COLONOSCOPY  2002    HX HERNIA REPAIR  2/6/2004    HX OTHER SURGICAL      Biopsy Prostate    HX OTHER SURGICAL  1/10/2008    HX VASCULAR ACCESS        Prior to Admission medications    Medication Sig Start Date End Date Taking? Authorizing Provider   loratadine (CLARITIN) 10 mg tablet Take 10 mg by mouth daily. Yes Historical Provider   GUAIFENESIN/CODEINE PHOSPHATE (ROBITUSSIN A-C PO) Take  by mouth. Historical Provider   predniSONE (DELTASONE) 10 mg tablet Take  by mouth daily (with breakfast). Historical Provider   hydralazine-hydrochlorothiazid 25-25 mg cap Take  by mouth. Yes Historical Provider   aspirin delayed-release (ASPIRIN LOW DOSE) 81 mg tablet Take  by mouth daily. Yes Historical Provider   lovastatin (MEVACOR) 40 mg tablet Take 40 mg by mouth nightly. Yes Historical Provider   ALPRAZolam (XANAX) 0.25 mg tablet Take  by mouth.      Yes Historical Provider ipratropium-albuterol (COMBIVENT)  mcg/actuation inhaler Take  by inhalation every six (6) hours as needed. Historical Provider   therapeutic multivitamin (THERAGRAN) tablet Take 1 Tab by mouth daily. Yes Historical Provider   dutaseride (AVODART) 0.5 mg capsule Take 0.5 mg by mouth daily. Yes Historical Provider   fluticasone-salmeterol (ADVAIR DISKUS) 500-50 mcg/dose diskus inhaler Take 1 Puff by inhalation every twelve (12) hours. Yes Historical Provider   albuterol sulfate (PROVENTIL;VENTOLIN) 2.5 mg/0.5 mL Nebu 2.5 mg by Nebulization route once. Yes Historical Provider   tiotropium (SPIRIVA WITH HANDIHALER) 18 mcg inhalation capsule Take 1 Cap by inhalation daily. Yes Historical Provider   albuterol (PROVENTIL HFA, VENTOLIN HFA) 90 mcg/actuation inhaler Take  by inhalation.      Yes Historical Provider     Current Facility-Administered Medications   Medication Dose Route Frequency    hydroCHLOROthiazide (HYDRODIURIL) tablet 25 mg  25 mg Oral DAILY    dilTIAZem (CARDIZEM) IR tablet 30 mg  30 mg Oral Q8H    methylPREDNISolone (PF) (SOLU-MEDROL) injection 40 mg  40 mg IntraVENous Q12H    levoFLOXacin (LEVAQUIN) tablet 750 mg  750 mg Oral Q24H    pantoprazole (PROTONIX) tablet 40 mg  40 mg Oral DAILY    dextrose 5% lactated ringers infusion  50 mL/hr IntraVENous CONTINUOUS    sodium chloride (NS) flush 5-10 mL  5-10 mL IntraVENous Q8H    enoxaparin (LOVENOX) injection 40 mg  40 mg SubCUTAneous Q24H    albuterol-ipratropium (DUO-NEB) 2.5 MG-0.5 MG/3 ML  3 mL Nebulization Q6H RT    aspirin delayed-release tablet 81 mg  81 mg Oral DAILY     No Known Allergies   Social History   Substance Use Topics    Smoking status: Former Smoker     Packs/day: 0.30     Years: 55.00     Types: Cigarettes     Quit date: 1/1/2004    Smokeless tobacco: Not on file    Alcohol use No      Family History   Problem Relation Age of Onset    Asthma Father         Review of Systems:   Negative except on HPI      Objective:   Vital Signs:    Visit Vitals    /87    Pulse (!) 102    Temp 98.4 °F (36.9 °C)    Resp 23    Ht 5' 6\" (1.676 m)    Wt 67.1 kg (147 lb 14.9 oz)    SpO2 96%    BMI 23.88 kg/m2       O2 Device: Nasal cannula   O2 Flow Rate (L/min): 1 l/min   Temp (24hrs), Av.3 °F (36.8 °C), Min:97.9 °F (36.6 °C), Max:98.8 °F (37.1 °C)       Intake/Output:   Last shift:         Last 3 shifts:  1901 -  0700  In: 8913 [P.O.:600; I.V.:1700]  Out: 600 [Urine:600]    Intake/Output Summary (Last 24 hours) at 17 0853  Last data filed at 17 0600   Gross per 24 hour   Intake             1050 ml   Output                0 ml   Net             1050 ml       Physical Exam:  General:   On O2/NC, awake and alert , no distress noted.    Head:   Normocephalic, without obvious abnormality, atraumatic.    Eyes:   Conjunctivae/corneas clear. PERRL, EOMs intact.    Nose:  Nares normal. Septum midline. Mucosa normal. No drainage or sinus tenderness.    Throat:  Lips, mucosa normal.    Neck:  Supple, symmetrical, trachea midline.    Back:   Symmetric, no curvature. ROM normal.    Lungs:   Few rhonchi, no wheezing on auscultation bilaterally.    Chest wall:   No tenderness or deformity.    Heart:   Regular rate and rhythm,  no murmur   Abdomen:   Soft, non-tender.  Bowel sounds normal. No masses, No organomegaly.    Extremities:  Extremities normal, atraumatic, no cyanosis or edema.    Pulses:  2+ and symmetric all extremities.    Skin:  Skin color, texture, turgor normal.    Lymph nodes:        Cervical, supraclavicular, and axillary nodes normal.    Neurologic:  Grossly nonfocal          Data:     Recent Results (from the past 12 hour(s))   CBC WITH AUTOMATED DIFF    Collection Time: 17  4:30 AM   Result Value Ref Range    WBC 15.9 (H) 4.6 - 13.2 K/uL    RBC 4.93 4.70 - 5.50 M/uL    HGB 12.9 (L) 13.0 - 16.0 g/dL    HCT 40.6 36.0 - 48.0 %    MCV 82.4 74.0 - 97.0 FL    MCH 26.2 24.0 - 34.0 PG    MCHC 31.8 31.0 - 37.0 g/dL    RDW 14.5 11.6 - 14.5 %    PLATELET 102 (H) 476 - 420 K/uL    MPV 9.9 9.2 - 11.8 FL    NEUTROPHILS 89 (H) 40 - 73 %    LYMPHOCYTES 5 (L) 21 - 52 %    MONOCYTES 6 3 - 10 %    EOSINOPHILS 0 0 - 5 %    BASOPHILS 0 0 - 2 %    ABS. NEUTROPHILS 14.2 (H) 1.8 - 8.0 K/UL    ABS. LYMPHOCYTES 0.8 (L) 0.9 - 3.6 K/UL    ABS. MONOCYTES 0.9 0.05 - 1.2 K/UL    ABS. EOSINOPHILS 0.0 0.0 - 0.4 K/UL    ABS. BASOPHILS 0.0 0.0 - 0.06 K/UL    DF AUTOMATED     METABOLIC PANEL, BASIC    Collection Time: 03/05/17  4:30 AM   Result Value Ref Range    Sodium 141 136 - 145 mmol/L    Potassium 4.3 3.5 - 5.5 mmol/L    Chloride 101 100 - 108 mmol/L    CO2 28 21 - 32 mmol/L    Anion gap 12 3.0 - 18 mmol/L    Glucose 141 (H) 74 - 99 mg/dL    BUN 23 (H) 7.0 - 18 MG/DL    Creatinine 0.80 0.6 - 1.3 MG/DL    BUN/Creatinine ratio 29 (H) 12 - 20      GFR est AA >60 >60 ml/min/1.73m2    GFR est non-AA >60 >60 ml/min/1.73m2    Calcium 8.7 8.5 - 10.1 MG/DL             Telemetry:  ST    Imaging:  I have personally reviewed the patients radiographs and have reviewed the reports:      Results from Hospital Encounter encounter on 03/01/17   XR CHEST PORT   Narrative EXAM: Chest, portable upright, one view    INDICATION: Shortness of breath    COMPARISON: 3/1/2017    _______________    FINDINGS:    Cardiac silhouette and pulmonary vascularity are normal. The visible spiculated  mass left apex measures about 16 mm, contains 3 fiducials, without change. Interval improving streaky opacities in the lung bases. Lungs are hyperinflated. The right costophrenic angle is sharp, the left costophrenic angle is barely  incompletely included. No pneumothorax.    _______________         Impression IMPRESSION:    Hyperinflation. Improving, nearly resolved bibasilar atelectasis and/or  infiltrate. Left apical mass containing fiducials, without apparent change.             Results from Hospital Encounter encounter on 03/01/17   CTA CHEST W WO CONT   Addendum Addendum:   Spiculated mass containing fiducials in the left upper lobe is difficult  to measure due to linear extension of atelectasis or scarring peripherally  from its lateral margin. It measures approximately 2.4 x 1.3 cm. In comparison with  a CT scan an outside institute dated 8/20/2016, the measurements are unchanged. Evgeny Roberson MD 3/3/2017  8:05 AM             Narrative EXAM: CTA Chest    INDICATION: Chest pain    COMPARISON: Single view chest done earlier today. TECHNIQUE: Axial CT imaging from the thoracic inlet through the diaphragm with  intravenous contrast. Coronal and sagittal MIP reformats were generated. One or more dose reduction techniques were used on this CT: automated exposure  control, adjustment of the mAs and/or kVp according to patient's size, and  iterative reconstruction techniques. The specific techniques utilized on this CT  exam have been documented in the patient's electronic medical record.    _______________    FINDINGS:    EXAM QUALITY: Overall exam quality is adequate with good opacification of  pulmonary arteries. Mild respiratory artifact in the lung bases. PULMONARY ARTERIES: No evidence of pulmonary embolism. MEDIASTINUM: Heart size is normal. No evidence of right heart strain. No  pericardial effusion. There is left ventricular hypertrophy. Atherosclerotic  changes of aorta. Esophagus is unremarkable. LYMPH NODES: There are some nonspecific normal sized superior mediastinal lymph  nodes. No pathologically enlarged adenopathy. AIRWAY: Normal.    LUNGS: There is a spiculated masslike opacity in the left upper lobe with some  high density areas. This probably represent fiducial markers in the left upper  lobe mass. Fiducial markers were seen on chest x-ray done today but were new  compared to prior exams. There are bilateral patchy lower lobe infiltrates which  are also seen on chest x-ray today.  These are new finding compared to prior  chest x-rays. There is a tree-in-bud appearance particularly in the right lower  lobe associated with the area of opacity. Patchy density inferior left upper  lobe could represent scarring or small focal acute infiltrate. Similar changes  are present in the right middle lobe No discrete pulmonary nodules other than  that described in the left upper lobe. Emphysematous changes with right upper  lobe scarring. PLEURA: No effusion or pneumothorax    UPPER ABDOMEN: Multiple bilateral simple renal cysts. There is a 1 in the  superior medial right kidney on axial image 236 which is less obviously cystic. This may be secondary to its small size and volume averaging. This measures 15  mm. Hounsfield units are approximately 35 for this particular lesion. Upper  abdominal structures are otherwise unremarkable. OTHER: No acute or aggressive osseous abnormalities identified. _______________         Impression IMPRESSION:    1. No evidence of pulmonary embolism. 2.  Spiculated left upper lobe lung mass with fiducial markers present within it  consistent with lung carcinoma. 3. Bilateral lower lobe patchy infiltrates which are also seen to be new on  chest x-ray done today. Findings are consistent with either pneumonia or  aspiration. 4. COPD with emphysematous changes. 5. Patchy areas of opacity in the inferior left upper lobe and right middle lobe  which could represent scarring or acute areas of infiltrate. 6. Multiple bilateral renal cysts. At least one in the medial upper pole region  of the right kidney is indeterminate. Solid mass in this location cannot be  ruled out.         Kathia Vidal NP  Pulmonary Critical Care & Sleep Medicine   [address] [place] [address], [address] [place] [address]  [phone]

## 2017-03-05 NOTE — PROGRESS NOTES
[facility]  Pulmonary, Critical Care, and Sleep Medicine    Name: Sofiya Bolden MRN: [id number]   : 1935 Hospital: [facility]   Date: 3/5/2017        Critical Care Progress Note    IMPRESSION:   · Acute on chronic hypoxemic resp failure likely multifactorial: infection/PNA, COPD exacerbation and possible mets w/hx CA of lung and other causes. On BIPAP  · COPD exacerbation, on home O2 baseline  · Hx Lung Cancer/lung mass  · Hx CAD-on Aspirin, Stress Echo 3/25-no ischemia, Nuclear stress test (3/30/2007) low probability of ischemia. · Hx HTN, BPH, OA, PAD. PLAN:     RS: Continue O2/NC, keep O2 sats >90%,  On home O2/NC as baseline. BIPAP PRN. Continue bronchodilators, steroid-taper when clinically indicated, aspiration precautions, HOB 30 degrees and bronchial hygiene. CTA chest 3/1-no PE. ABG PRN/or change condition. Repeat CXR 3/3 reviewed-mildly improved. Periodic CXR to assess clearing. CVS: BNP ok (235) Troponin-ok  (235). BP elevated-restart HCTZ listed on home med's and  Added Cardizem 30 mg Q8hr BP, continue PRN hydralazine for BP>160. Echo 10/4/2010 reviewed, showed EF 65%, nwa. Repeat Echo 3/2/17 reviewed EF 55-60%, nwa G1DD,  ID: Ab- Continue Levaquin po, primary d/c Vanco 3/4, blood cultures X 1 (3/1) prelim result growing GPC in pairs chains/strep pneumonia. Sputum cx; prelim. Growing GPC/GNR-final result pending, Repeat blood cx (3/2) NGTD. Leukocytosis trending up but currently afebrile.  Trend temp and wbc, sepsis bundle per protocol  ENDO:  Monitor glucose,   GI: PPI   METABOLIC:   Monitor lytes, replaced as needed   RENAL:  Monitor renal/creat  CNS:  Monitor mentation   HEMATOLOGY: Monitor H/H and platelets  MUSCULOSKELETAL: Moves all extremities   PAIN AND SEDATION:  PRN  NUTRITION: Cardiac diet,, D5 LR 50 ml/hr -d/c  ADVANCE DIRECTIVE:  Full code, Palliative following  FAMILY DISCUSSION: No family at bedside  Lines:  PIV      Quality Care: PPI, DVT prophylaxis, HOB elevated, Infection control all reviewed and addressed. Events and notes from last 24 hours reviewed. Care plan discussed with nursing         Subjective/History: This patient has been seen and evaluated at the request of Prema Crespo (PA-Hospitalist) for Acute respiratory failure/COPD exacerbation/PNA. HPI obtained mostly from records reviewed,  Pt currently on BIPAP. Patient is a 81 y.o. male PMHx: Lung Cancer, HTN, BPH, COPD and arthritis presented to ED via EMS due to SOB after walking in the parking lot and nonproductive cough. In ED placed on BIPAP with improvement, had ABG and CXR. Transfer to ICU stepdown for further monitoring. 3/2/17:  Pt currently on BIPAP with sats 98% on bedside monitoring , he report sleepy, response appropriately and follow commands. 3/3/17:   Pt remain on BIPAP with sats 98%. No accessory muscle use noted. Per RN pt able to be off BIPAP for 2 hrs-had sips of liquid, still sleepy this am, able to response simple question by nodding head,follows commands, he did not like taking off blanket for assessment, no distress noted. Glucose elevated, continue to monitor, no hx DM  CXR today better  Elevated BP/Tachy will add low dose Cardizem (short acting in divided dose) po if able to wean off BIPAP, otherwise monitor for now. 3/4/17:  Pt awake and alert on BIPAP with sats 98% on bedside monitoring, pleasant and appear more conversant today but requested to continue BIPAP for now, no accessory muscle use or respiratory distress noted. He report no SOB, no chest pain, cough or hemoptysis. He report on off Prednisone use OP, last px was last summer. He had 6 XRT for lung cancer 2013 dx, He independent for ADL's and stationary bikes-last wk for 3 hrs without SOB. He use Home O2/NC as baseline, neb/MDI, not on home CPAP, he follows OP only PCP, no pulmonologist seen since radiation tx. WBC trending up but currently afebrile.   Hb stable    3/5/17:  Pt awake and alert, on O2/NC @1 Lpm with sats 97%on bedside monitoring. He report did not use BIPAP and did well on cont O2/NC. He denies SOB chest pain, report coughing with productive cough with white mucus, no hemoptysis  BP persistently  Elevated, home HCTZ 25 mg every day started, will add low dose Cardizem 30 mg Q8hrs and continue hydralazine PRN for SBP>160  WBC trending up-on Levaquin 750 mg po, Vanco d/c yesterday by primary. Follow cultures  Afebrile. Medication Review:  · Pressors - none  · Sedation - none  · Antibiotics - Levaquin  · Pain - none  · GI/ DVT - Protonix/Lovenox  · Others (other gtts)D5LR 50 ml/hr. Past Medical History:   Diagnosis Date    Arthritis     Arthropathy, unspecified, site unspecified     Asthma     BPH (benign prostatic hyperplasia)     COPD (chronic obstructive pulmonary disease) ([facility] [address]     Hypertension     Microscopic hematuria       Past Surgical History:   Procedure Laterality Date    HX AAA REPAIR      HX COLONOSCOPY  2002    HX HERNIA REPAIR  2/6/2004    HX OTHER SURGICAL      Biopsy Prostate    HX OTHER SURGICAL  1/10/2008    HX VASCULAR ACCESS        Prior to Admission medications    Medication Sig Start Date End Date Taking? Authorizing Provider   loratadine (CLARITIN) 10 mg tablet Take 10 mg by mouth daily. Yes Historical Provider   GUAIFENESIN/CODEINE PHOSPHATE (ROBITUSSIN A-C PO) Take  by mouth. Historical Provider   predniSONE (DELTASONE) 10 mg tablet Take  by mouth daily (with breakfast). Historical Provider   hydralazine-hydrochlorothiazid 25-25 mg cap Take  by mouth. Yes Historical Provider   aspirin delayed-release (ASPIRIN LOW DOSE) 81 mg tablet Take  by mouth daily. Yes Historical Provider   lovastatin (MEVACOR) 40 mg tablet Take 40 mg by mouth nightly. Yes Historical Provider   ALPRAZolam (XANAX) 0.25 mg tablet Take  by mouth.      Yes Historical Provider   ipratropium-albuterol (COMBIVENT)  mcg/actuation inhaler Take  by inhalation every six (6) hours as needed. Historical Provider   therapeutic multivitamin (THERAGRAN) tablet Take 1 Tab by mouth daily. Yes Historical Provider   dutaseride (AVODART) 0.5 mg capsule Take 0.5 mg by mouth daily. Yes Historical Provider   fluticasone-salmeterol (ADVAIR DISKUS) 500-50 mcg/dose diskus inhaler Take 1 Puff by inhalation every twelve (12) hours. Yes Historical Provider   albuterol sulfate (PROVENTIL;VENTOLIN) 2.5 mg/0.5 mL Nebu 2.5 mg by Nebulization route once. Yes Historical Provider   tiotropium (SPIRIVA WITH HANDIHALER) 18 mcg inhalation capsule Take 1 Cap by inhalation daily. Yes Historical Provider   albuterol (PROVENTIL HFA, VENTOLIN HFA) 90 mcg/actuation inhaler Take  by inhalation.      Yes Historical Provider     Current Facility-Administered Medications   Medication Dose Route Frequency    hydroCHLOROthiazide (HYDRODIURIL) tablet 25 mg  25 mg Oral DAILY    dilTIAZem (CARDIZEM) IR tablet 30 mg  30 mg Oral Q8H    methylPREDNISolone (PF) (SOLU-MEDROL) injection 40 mg  40 mg IntraVENous Q12H    levoFLOXacin (LEVAQUIN) tablet 750 mg  750 mg Oral Q24H    pantoprazole (PROTONIX) tablet 40 mg  40 mg Oral DAILY    dextrose 5% lactated ringers infusion  50 mL/hr IntraVENous CONTINUOUS    sodium chloride (NS) flush 5-10 mL  5-10 mL IntraVENous Q8H    enoxaparin (LOVENOX) injection 40 mg  40 mg SubCUTAneous Q24H    albuterol-ipratropium (DUO-NEB) 2.5 MG-0.5 MG/3 ML  3 mL Nebulization Q6H RT    aspirin delayed-release tablet 81 mg  81 mg Oral DAILY     No Known Allergies   Social History   Substance Use Topics    Smoking status: Former Smoker     Packs/day: 0.30     Years: 55.00     Types: Cigarettes     Quit date: 1/1/2004    Smokeless tobacco: Not on file    Alcohol use No      Family History   Problem Relation Age of Onset    Asthma Father         Review of Systems:   Negative except on HPI      Objective:   Vital Signs: Visit Vitals    /87    Pulse (!) 102    Temp 98.4 °F (36.9 °C)    Resp 23    Ht 5' 6\" (1.676 m)    Wt 67.1 kg (147 lb 14.9 oz)    SpO2 96%    BMI 23.88 kg/m2       O2 Device: Nasal cannula   O2 Flow Rate (L/min): 1 l/min   Temp (24hrs), Av.3 °F (36.8 °C), Min:97.9 °F (36.6 °C), Max:98.8 °F (37.1 °C)       Intake/Output:   Last shift:         Last 3 shifts:  1901 -  0700  In: 3760 [P.O.:600; I.V.:1700]  Out: 600 [Urine:600]    Intake/Output Summary (Last 24 hours) at 17 0853  Last data filed at 17 0600   Gross per 24 hour   Intake             1050 ml   Output                0 ml   Net             1050 ml       Physical Exam:  General:   On O2/NC, awake and alert , no distress noted.    Head:   Normocephalic, without obvious abnormality, atraumatic.    Eyes:   Conjunctivae/corneas clear. PERRL, EOMs intact.    Nose:  Nares normal. Septum midline. Mucosa normal. No drainage or sinus tenderness.    Throat:  Lips, mucosa normal.    Neck:  Supple, symmetrical, trachea midline.    Back:   Symmetric, no curvature. ROM normal.    Lungs:   Few rhonchi, no wheezing on auscultation bilaterally.    Chest wall:   No tenderness or deformity.    Heart:   Regular rate and rhythm,  no murmur   Abdomen:   Soft, non-tender.  Bowel sounds normal. No masses, No organomegaly.    Extremities:  Extremities normal, atraumatic, no cyanosis or edema.    Pulses:  2+ and symmetric all extremities.    Skin:  Skin color, texture, turgor normal.    Lymph nodes:        Cervical, supraclavicular, and axillary nodes normal.    Neurologic:  Grossly nonfocal          Data:     Recent Results (from the past 12 hour(s))   CBC WITH AUTOMATED DIFF    Collection Time: 17  4:30 AM   Result Value Ref Range    WBC 15.9 (H) 4.6 - 13.2 K/uL    RBC 4.93 4.70 - 5.50 M/uL    HGB 12.9 (L) 13.0 - 16.0 g/dL    HCT 40.6 36.0 - 48.0 %    MCV 82.4 74.0 - 97.0 FL    MCH 26.2 24.0 - 34.0 PG    MCHC 31.8 31.0 - 37.0 g/dL    RDW 14.5 11.6 - 14.5 %    PLATELET 391 (H) 068 - 420 K/uL    MPV 9.9 9.2 - 11.8 FL    NEUTROPHILS 89 (H) 40 - 73 %    LYMPHOCYTES 5 (L) 21 - 52 %    MONOCYTES 6 3 - 10 %    EOSINOPHILS 0 0 - 5 %    BASOPHILS 0 0 - 2 %    ABS. NEUTROPHILS 14.2 (H) 1.8 - 8.0 K/UL    ABS. LYMPHOCYTES 0.8 (L) 0.9 - 3.6 K/UL    ABS. MONOCYTES 0.9 0.05 - 1.2 K/UL    ABS. EOSINOPHILS 0.0 0.0 - 0.4 K/UL    ABS. BASOPHILS 0.0 0.0 - 0.06 K/UL    DF AUTOMATED     METABOLIC PANEL, BASIC    Collection Time: 03/05/17  4:30 AM   Result Value Ref Range    Sodium 141 136 - 145 mmol/L    Potassium 4.3 3.5 - 5.5 mmol/L    Chloride 101 100 - 108 mmol/L    CO2 28 21 - 32 mmol/L    Anion gap 12 3.0 - 18 mmol/L    Glucose 141 (H) 74 - 99 mg/dL    BUN 23 (H) 7.0 - 18 MG/DL    Creatinine 0.80 0.6 - 1.3 MG/DL    BUN/Creatinine ratio 29 (H) 12 - 20      GFR est AA >60 >60 ml/min/1.73m2    GFR est non-AA >60 >60 ml/min/1.73m2    Calcium 8.7 8.5 - 10.1 MG/DL             Telemetry:  ST    Imaging:  I have personally reviewed the patients radiographs and have reviewed the reports:      Results from Hospital Encounter encounter on 03/01/17   XR CHEST PORT   Narrative EXAM: Chest, portable upright, one view    INDICATION: Shortness of breath    COMPARISON: 3/1/2017    _______________    FINDINGS:    Cardiac silhouette and pulmonary vascularity are normal. The visible spiculated  mass left apex measures about 16 mm, contains 3 fiducials, without change. Interval improving streaky opacities in the lung bases. Lungs are hyperinflated. The right costophrenic angle is sharp, the left costophrenic angle is barely  incompletely included. No pneumothorax.    _______________         Impression IMPRESSION:    Hyperinflation. Improving, nearly resolved bibasilar atelectasis and/or  infiltrate. Left apical mass containing fiducials, without apparent change.             Results from [place] encounter on 03/01/17   CTA CHEST W WO CONT   Addendum Addendum:   Spiculated mass containing fiducials in the left upper lobe is difficult  to measure due to linear extension of atelectasis or scarring peripherally  from its lateral margin. It measures approximately 2.4 x 1.3 cm. In comparison with  a CT scan an outside institute dated 8/20/2016, the measurements are unchanged. Lita Blas MD 3/3/2017  8:05 AM             Narrative EXAM: CTA Chest    INDICATION: Chest pain    COMPARISON: Single view chest done earlier today. TECHNIQUE: Axial CT imaging from the thoracic inlet through the diaphragm with  intravenous contrast. Coronal and sagittal MIP reformats were generated. One or more dose reduction techniques were used on this CT: automated exposure  control, adjustment of the mAs and/or kVp according to patient's size, and  iterative reconstruction techniques. The specific techniques utilized on this CT  exam have been documented in the patient's electronic medical record.    _______________    FINDINGS:    EXAM QUALITY: Overall exam quality is adequate with good opacification of  pulmonary arteries. Mild respiratory artifact in the lung bases. PULMONARY ARTERIES: No evidence of pulmonary embolism. MEDIASTINUM: Heart size is normal. No evidence of right heart strain. No  pericardial effusion. There is left ventricular hypertrophy. Atherosclerotic  changes of aorta. Esophagus is unremarkable. LYMPH NODES: There are some nonspecific normal sized superior mediastinal lymph  nodes. No pathologically enlarged adenopathy. AIRWAY: Normal.    LUNGS: There is a spiculated masslike opacity in the left upper lobe with some  high density areas. This probably represent fiducial markers in the left upper  lobe mass. Fiducial markers were seen on chest x-ray done today but were new  compared to prior exams. There are bilateral patchy lower lobe infiltrates which  are also seen on chest x-ray today. These are new finding compared to prior  chest x-rays.  There is a tree-in-bud appearance particularly in the right lower  lobe associated with the area of opacity. Patchy density inferior left upper  lobe could represent scarring or small focal acute infiltrate. Similar changes  are present in the right middle lobe No discrete pulmonary nodules other than  that described in the left upper lobe. Emphysematous changes with right upper  lobe scarring. PLEURA: No effusion or pneumothorax    UPPER ABDOMEN: Multiple bilateral simple renal cysts. There is a 1 in the  superior medial right kidney on axial image 236 which is less obviously cystic. This may be secondary to its small size and volume averaging. This measures 15  mm. Hounsfield units are approximately 35 for this particular lesion. Upper  abdominal structures are otherwise unremarkable. OTHER: No acute or aggressive osseous abnormalities identified. _______________         Impression IMPRESSION:    1. No evidence of pulmonary embolism. 2.  Spiculated left upper lobe lung mass with fiducial markers present within it  consistent with lung carcinoma. 3. Bilateral lower lobe patchy infiltrates which are also seen to be new on  chest x-ray done today. Findings are consistent with either pneumonia or  aspiration. 4. COPD with emphysematous changes. 5. Patchy areas of opacity in the inferior left upper lobe and right middle lobe  which could represent scarring or acute areas of infiltrate. 6. Multiple bilateral renal cysts. At least one in the medial upper pole region  of the right kidney is indeterminate. Solid mass in this location cannot be  ruled out.         Catrachito Dallas NP  Pulmonary Critical Care & Sleep Medicine   [address] [place] [address], [address] [place] [address]  [phone]

## 2017-03-05 NOTE — PROGRESS NOTES
Bedside and Verbal shift change report given to Maribel Haro RN (oncoming nurse) by Melinda Martinez RN (offgoing nurse).  Report included the following information SBAR, Kardex, Intake/Output, MAR, Recent Results, Med Rec Status and Cardiac Rhythm SR.

## 2017-03-05 NOTE — PROGRESS NOTES
[address] [place], [place] [address]  Hospitalist Division    Daily progress Note    Patient: Fredy Hamilton MRN: [id number]  CSN: [id number]    YOB: 1935  Age: 81 y.o. Sex: male    DOA: 3/1/2017 LOS:  LOS: 4 days                    Subjective:     CC: Shortness of breath   Awake and alert  Breathing better, off BIPAP since yesterday   Ambulating in his room. Objective:      Visit Vitals    /87    Pulse (!) 102    Temp 98.4 °F (36.9 °C)    Resp 23    Ht 5' 6\" (1.676 m)    Wt 67.1 kg (147 lb 14.9 oz)    SpO2 96%    BMI 23.88 kg/m2       Physical Exam:    NC/AT  NO JVD,TMG  S1/S2 RRR  DEC BS, NO WHEEZING  NT,ND, NABS  NO EDEMA  CN INTACT, MS EQUAL ALL 4 EXT        Intake and Output:  Current Shift:     Last three shifts:  03/03 1901 - 03/05 0700  In: 5112 [P.O.:600; I.V.:1700]  Out: 600 [Urine:600]    Recent Results (from the past 24 hour(s))   CBC WITH AUTOMATED DIFF    Collection Time: 03/05/17  4:30 AM   Result Value Ref Range    WBC 15.9 (H) 4.6 - 13.2 K/uL    RBC 4.93 4.70 - 5.50 M/uL    HGB 12.9 (L) 13.0 - 16.0 g/dL    HCT 40.6 36.0 - 48.0 %    MCV 82.4 74.0 - 97.0 FL    MCH 26.2 24.0 - 34.0 PG    MCHC 31.8 31.0 - 37.0 g/dL    RDW 14.5 11.6 - 14.5 %    PLATELET 381 (H) 452 - 420 K/uL    MPV 9.9 9.2 - 11.8 FL    NEUTROPHILS 89 (H) 40 - 73 %    LYMPHOCYTES 5 (L) 21 - 52 %    MONOCYTES 6 3 - 10 %    EOSINOPHILS 0 0 - 5 %    BASOPHILS 0 0 - 2 %    ABS. NEUTROPHILS 14.2 (H) 1.8 - 8.0 K/UL    ABS. LYMPHOCYTES 0.8 (L) 0.9 - 3.6 K/UL    ABS. MONOCYTES 0.9 0.05 - 1.2 K/UL    ABS. EOSINOPHILS 0.0 0.0 - 0.4 K/UL    ABS.  BASOPHILS 0.0 0.0 - 0.06 K/UL    DF AUTOMATED     METABOLIC PANEL, BASIC    Collection Time: 03/05/17  4:30 AM   Result Value Ref Range    Sodium 141 136 - 145 mmol/L    Potassium 4.3 3.5 - 5.5 mmol/L    Chloride 101 100 - 108 mmol/L    CO2 28 21 - 32 mmol/L    Anion gap 12 3.0 - 18 mmol/L    Glucose 141 (H) 74 - 99 mg/dL    BUN 23 (H) 7.0 - 18 MG/DL Creatinine 0.80 0.6 - 1.3 MG/DL    BUN/Creatinine ratio 29 (H) 12 - 20      GFR est AA >60 >60 ml/min/1.73m2    GFR est non-AA >60 >60 ml/min/1.73m2    Calcium 8.7 8.5 - 10.1 MG/DL         Current Facility-Administered Medications:     hydroCHLOROthiazide (HYDRODIURIL) tablet 25 mg, 25 mg, Oral, DAILY, Kaitlin Blair MD, 25 mg at 03/05/17 0850    hydrALAZINE (APRESOLINE) tablet 25 mg, 25 mg, Oral, BID PRN, Lucy Minasmita, NP, 25 mg at 03/05/17 0604    dilTIAZem (CARDIZEM) IR tablet 30 mg, 30 mg, Oral, Q8H, Lucy Minasmita, NP, 30 mg at 03/05/17 0936    methylPREDNISolone (PF) (SOLU-MEDROL) injection 40 mg, 40 mg, IntraVENous, Q12H, Lucy Ceron, MARIO, 40 mg at 03/05/17 0853    levoFLOXacin (LEVAQUIN) tablet 750 mg, 750 mg, Oral, Q24H, Eduardo Mccauley MD, 750 mg at 03/04/17 1802    pantoprazole (PROTONIX) tablet 40 mg, 40 mg, Oral, DAILY, Eduardo Mccauley MD    sodium chloride (OCEAN) 0.65 % nasal spray 2 Spray, 2 Spray, Both Nostrils, PRN, Eduardo Mccauley MD, 2 [place] at 03/05/17 0935    sodium chloride (NS) flush 5-10 mL, 5-10 mL, IntraVENous, Q8H, Perez Jorge, DO, 10 mL at 03/04/17 2200    sodium chloride (NS) flush 5-10 mL, 5-10 mL, IntraVENous, PRN, Perez Jorge, DO, 10 mL at 03/01/17 2148    acetaminophen (TYLENOL) tablet 650 mg, 650 mg, Oral, Q4H PRN, DEBORAH Sabillon, 650 mg at 03/01/17 2238    ondansetron (ZOFRAN) injection 4 mg, 4 mg, IntraVENous, Q4H PRN, DEBORAH Sabillon    enoxaparin (LOVENOX) injection 40 mg, 40 mg, SubCUTAneous, Q24H, Neisha Sabillon, 40 mg at 03/04/17 2148    albuterol-ipratropium (DUO-NEB) 2.5 MG-0.5 MG/3 ML, 3 mL, Nebulization, Q6H RT, DEBORAH Sabillon, 3 mL at 03/05/17 0124    aspirin delayed-release tablet 81 mg, 81 mg, Oral, DAILY, Neisha Sabillno, 81 mg at 03/05/17 0850    Lab Results   Component Value Date/Time    Glucose 141 03/05/2017 04:30 AM    Glucose 158 03/04/2017 05:00 AM    Glucose 153 03/03/2017 04:00 AM    Glucose 154 03/02/2017 03:20 AM    Glucose 130 03/01/2017 02:00 PM        Assessment/Plan     Principal Problem:    Respiratory failure with hypoxia ([facility] [address] (3/1/2017)    Active Problems:    Lung mass (11/27/2012)      Renal mass (11/27/2012)      PAD (peripheral artery disease) ([facility] [address] (11/27/2012)      Pulmonary nodules (11/27/2012)      CAD (coronary artery disease) (11/27/2012)      HTN (hypertension) (11/27/2012)      Hyperlipidemia (11/27/2012)      BPH (benign prostatic hypertrophy) (11/27/2012)      Shortness of breath (3/3/2017)      - Acute on chronic hypoxic respiratory failure with respiratory distress ,monitor off BIPAP   - Acute COPD exacerbation, taper Solumedrol,  Cont HHN and Abx  - Pneumonia - Continue Levaquin.   - bacteremia with   strep pneumonia sens to levaquin. Repeat cultures are negative  - Lung mass with hx of Lung CA - s/p Neuroendocrine Carcinoma.  Heme/ Onc following.  - Palliative care following, pt was DNR in past but he has changed his status to full code.     Aroldo Espinal MD  3/5/2017, 2:56 PM

## 2017-03-06 LAB
ANION GAP BLD CALC-SCNC: 9 MMOL/L (ref 3–18)
BACTERIA SPEC CULT: ABNORMAL
BASOPHILS # BLD AUTO: 0 K/UL (ref 0–0.06)
BASOPHILS # BLD: 0 % (ref 0–2)
BUN SERPL-MCNC: 22 MG/DL (ref 7–18)
BUN/CREAT SERPL: 30 (ref 12–20)
CALCIUM SERPL-MCNC: 8.8 MG/DL (ref 8.5–10.1)
CHLORIDE SERPL-SCNC: 96 MMOL/L (ref 100–108)
CO2 SERPL-SCNC: 33 MMOL/L (ref 21–32)
CREAT SERPL-MCNC: 0.74 MG/DL (ref 0.6–1.3)
DIFFERENTIAL METHOD BLD: ABNORMAL
EOSINOPHIL # BLD: 0 K/UL (ref 0–0.4)
EOSINOPHIL NFR BLD: 0 % (ref 0–5)
ERYTHROCYTE [DISTWIDTH] IN BLOOD BY AUTOMATED COUNT: 14.2 % (ref 11.6–14.5)
GLUCOSE SERPL-MCNC: 133 MG/DL (ref 74–99)
GRAM STN SPEC: ABNORMAL
HCT VFR BLD AUTO: 40 % (ref 36–48)
HGB BLD-MCNC: 13.2 G/DL (ref 13–16)
LYMPHOCYTES # BLD AUTO: 5 % (ref 21–52)
LYMPHOCYTES # BLD: 0.9 K/UL (ref 0.9–3.6)
MCH RBC QN AUTO: 26.6 PG (ref 24–34)
MCHC RBC AUTO-ENTMCNC: 33 G/DL (ref 31–37)
MCV RBC AUTO: 80.5 FL (ref 74–97)
MONOCYTES # BLD: 0.6 K/UL (ref 0.05–1.2)
MONOCYTES NFR BLD AUTO: 3 % (ref 3–10)
NEUTS SEG # BLD: 14.7 K/UL (ref 1.8–8)
NEUTS SEG NFR BLD AUTO: 92 % (ref 40–73)
PLATELET # BLD AUTO: 441 K/UL (ref 135–420)
PMV BLD AUTO: 9.6 FL (ref 9.2–11.8)
POTASSIUM SERPL-SCNC: 3.9 MMOL/L (ref 3.5–5.5)
RBC # BLD AUTO: 4.97 M/UL (ref 4.7–5.5)
SERVICE CMNT-IMP: ABNORMAL
SODIUM SERPL-SCNC: 138 MMOL/L (ref 136–145)
WBC # BLD AUTO: 16.1 K/UL (ref 4.6–13.2)

## 2017-03-06 PROCEDURE — 36415 COLL VENOUS BLD VENIPUNCTURE: CPT | Performed by: INTERNAL MEDICINE

## 2017-03-06 PROCEDURE — 65660000000 HC RM CCU STEPDOWN

## 2017-03-06 PROCEDURE — 74011000250 HC RX REV CODE- 250: Performed by: INTERNAL MEDICINE

## 2017-03-06 PROCEDURE — 77010033678 HC OXYGEN DAILY

## 2017-03-06 PROCEDURE — 85025 COMPLETE CBC W/AUTO DIFF WBC: CPT | Performed by: INTERNAL MEDICINE

## 2017-03-06 PROCEDURE — 94760 N-INVAS EAR/PLS OXIMETRY 1: CPT

## 2017-03-06 PROCEDURE — 74011250636 HC RX REV CODE- 250/636: Performed by: NURSE PRACTITIONER

## 2017-03-06 PROCEDURE — 74011250637 HC RX REV CODE- 250/637: Performed by: PHYSICIAN ASSISTANT

## 2017-03-06 PROCEDURE — 74011250637 HC RX REV CODE- 250/637: Performed by: FAMILY MEDICINE

## 2017-03-06 PROCEDURE — 76450000000

## 2017-03-06 PROCEDURE — 74011250637 HC RX REV CODE- 250/637: Performed by: INTERNAL MEDICINE

## 2017-03-06 PROCEDURE — 74011250637 HC RX REV CODE- 250/637: Performed by: NURSE PRACTITIONER

## 2017-03-06 PROCEDURE — 77030029684 HC NEB SM VOL KT MONA -A

## 2017-03-06 PROCEDURE — 94640 AIRWAY INHALATION TREATMENT: CPT

## 2017-03-06 PROCEDURE — 74011000250 HC RX REV CODE- 250: Performed by: PHYSICIAN ASSISTANT

## 2017-03-06 PROCEDURE — 80048 BASIC METABOLIC PNL TOTAL CA: CPT | Performed by: INTERNAL MEDICINE

## 2017-03-06 PROCEDURE — 74011250636 HC RX REV CODE- 250/636: Performed by: PHYSICIAN ASSISTANT

## 2017-03-06 RX ORDER — CEPHALEXIN 250 MG/1
500 CAPSULE ORAL EVERY 6 HOURS
Status: DISCONTINUED | OUTPATIENT
Start: 2017-03-06 | End: 2017-03-08 | Stop reason: HOSPADM

## 2017-03-06 RX ADMIN — PANTOPRAZOLE SODIUM 40 MG: 40 TABLET, DELAYED RELEASE ORAL at 08:56

## 2017-03-06 RX ADMIN — CEPHALEXIN 500 MG: 250 CAPSULE ORAL at 23:23

## 2017-03-06 RX ADMIN — IPRATROPIUM BROMIDE AND ALBUTEROL SULFATE 3 ML: .5; 3 SOLUTION RESPIRATORY (INHALATION) at 20:07

## 2017-03-06 RX ADMIN — ARFORMOTEROL TARTRATE 15 MCG: 15 SOLUTION RESPIRATORY (INHALATION) at 11:27

## 2017-03-06 RX ADMIN — DILTIAZEM HYDROCHLORIDE 30 MG: 30 TABLET, FILM COATED ORAL at 23:23

## 2017-03-06 RX ADMIN — ENOXAPARIN SODIUM 40 MG: 40 INJECTION SUBCUTANEOUS at 21:15

## 2017-03-06 RX ADMIN — ARFORMOTEROL TARTRATE 15 MCG: 15 SOLUTION RESPIRATORY (INHALATION) at 20:07

## 2017-03-06 RX ADMIN — IPRATROPIUM BROMIDE AND ALBUTEROL SULFATE 3 ML: .5; 3 SOLUTION RESPIRATORY (INHALATION) at 13:17

## 2017-03-06 RX ADMIN — SALINE NASAL SPRAY 2 SPRAY: 1.5 SOLUTION NASAL at 09:02

## 2017-03-06 RX ADMIN — HYDRALAZINE HYDROCHLORIDE 25 MG: 25 TABLET ORAL at 21:39

## 2017-03-06 RX ADMIN — Medication 10 ML: at 14:00

## 2017-03-06 RX ADMIN — METHYLPREDNISOLONE SODIUM SUCCINATE 40 MG: 125 INJECTION, POWDER, FOR SOLUTION INTRAMUSCULAR; INTRAVENOUS at 21:16

## 2017-03-06 RX ADMIN — METHYLPREDNISOLONE SODIUM SUCCINATE 40 MG: 125 INJECTION, POWDER, FOR SOLUTION INTRAMUSCULAR; INTRAVENOUS at 08:55

## 2017-03-06 RX ADMIN — DILTIAZEM HYDROCHLORIDE 30 MG: 30 TABLET, FILM COATED ORAL at 05:33

## 2017-03-06 RX ADMIN — HYDRALAZINE HYDROCHLORIDE 25 MG: 25 TABLET ORAL at 05:33

## 2017-03-06 RX ADMIN — DILTIAZEM HYDROCHLORIDE 30 MG: 30 TABLET, FILM COATED ORAL at 13:14

## 2017-03-06 RX ADMIN — HYDROCHLOROTHIAZIDE 25 MG: 25 TABLET ORAL at 08:56

## 2017-03-06 RX ADMIN — Medication 10 ML: at 21:21

## 2017-03-06 RX ADMIN — DILTIAZEM HYDROCHLORIDE 30 MG: 30 TABLET, FILM COATED ORAL at 00:21

## 2017-03-06 RX ADMIN — BUDESONIDE 500 MCG: 0.5 INHALANT RESPIRATORY (INHALATION) at 11:27

## 2017-03-06 RX ADMIN — ASPIRIN 81 MG: 81 TABLET, COATED ORAL at 08:56

## 2017-03-06 RX ADMIN — DILTIAZEM HYDROCHLORIDE 30 MG: 30 TABLET, FILM COATED ORAL at 18:15

## 2017-03-06 RX ADMIN — CEPHALEXIN 500 MG: 250 CAPSULE ORAL at 18:15

## 2017-03-06 RX ADMIN — CEPHALEXIN 500 MG: 250 CAPSULE ORAL at 13:14

## 2017-03-06 RX ADMIN — Medication 10 ML: at 09:03

## 2017-03-06 RX ADMIN — BUDESONIDE 500 MCG: 0.5 INHALANT RESPIRATORY (INHALATION) at 20:07

## 2017-03-06 NOTE — PROGRESS NOTES
0345 Pt transfer from ICU to [address] [place] [address], Patient arrived on unit via bed , Pt AOX4, Assessment completed. Patient denies any pain at this time. 6699 Patient denies any pain at this time, Pt given PRN hydralazine for elevated BP.      0700 Bedside and Verbal shift change report given to Shira (oncoming nurse) by Rylie Small (offgoing nurse). Report included the following information SBAR, Kardex, OR Summary, Intake/Output and Recent Results. Off going nurse notified oncoming nurse about patients elevated BP.

## 2017-03-06 NOTE — PROGRESS NOTES
Hospitalist Progress Note    Subjective:   Daily Progress Note: 3/6/2017 4:00 PM  Admitted for acute copd exacerbation and pneumonia. Sputum grew staph aureus and few candida. Blx cx on 3/1 grew strep pneumoniae,repeated bl cx negative. Both staph and strep are sensitive to keflex and ID elected to d/c levaquin and prescribed keflex. Pt still coughing. Current Facility-Administered Medications   Medication Dose Route Frequency    cephALEXin (KEFLEX) capsule 500 mg  500 mg Oral Q6H    hydroCHLOROthiazide (HYDRODIURIL) tablet 25 mg  25 mg Oral DAILY    hydrALAZINE (APRESOLINE) tablet 25 mg  25 mg Oral BID PRN    budesonide (PULMICORT) 500 mcg/2 ml nebulizer suspension  500 mcg Nebulization BID RT    arformoterol (BROVANA) neb solution 15 mcg  15 mcg Nebulization BID RT    dilTIAZem (CARDIZEM) IR tablet 30 mg  30 mg Oral Q6H    methylPREDNISolone (PF) (SOLU-MEDROL) injection 40 mg  40 mg IntraVENous Q12H    pantoprazole (PROTONIX) tablet 40 mg  40 mg Oral DAILY    sodium chloride (OCEAN) 0.65 % nasal spray 2 Spray  2 Spray Both Nostrils PRN    sodium chloride (NS) flush 5-10 mL  5-10 mL IntraVENous Q8H    sodium chloride (NS) flush 5-10 mL  5-10 mL IntraVENous PRN    acetaminophen (TYLENOL) tablet 650 mg  650 mg Oral Q4H PRN    ondansetron (ZOFRAN) injection 4 mg  4 mg IntraVENous Q4H PRN    enoxaparin (LOVENOX) injection 40 mg  40 mg SubCUTAneous Q24H    albuterol-ipratropium (DUO-NEB) 2.5 MG-0.5 MG/3 ML  3 mL Nebulization Q6H RT    aspirin delayed-release tablet 81 mg  81 mg Oral DAILY        Review of Systems  Still coughing,not at baseline yet.     Objective:     Visit Vitals    /83 (BP Patient Position: At rest)    Pulse (!) 106    Temp 98.2 °F (36.8 °C)    Resp 16    Ht 5' 6\" (1.676 m)    Wt 67.1 kg (147 lb 14.9 oz)    SpO2 95%    BMI 23.88 kg/m2    O2 Flow Rate (L/min): 3 l/min O2 Device: Nasal cannula    Temp (24hrs), Av.1 °F (36.7 °C), Min:97.5 °F (36.4 °C), Max:98.3 °F (36.8 °C)         03/04 1901 - 03/06 0700  In: 550 [I.V.:550]  Out: -   P/E  NAD,sitting in bed talking to family member. Lungs:poor air entry wheezing bilateral,no ronchi  Heart s1s2 nl,no m/g  Abdm:soft,not tender  Extr:no edema,good pedis pulses. Neuro:aaox4. Data Review    No results found for this or any previous visit (from the past 12 hour(s)). Assessment/Plan:     Principal Problem:    Respiratory failure with hypoxia ([facility] (3/1/2017)    Active Problems:    Lung mass (11/27/2012)      Renal mass (11/27/2012)      PAD (peripheral artery disease) ([facility] (11/27/2012)      Pulmonary nodules (11/27/2012)      CAD (coronary artery disease) (11/27/2012)      HTN (hypertension) (11/27/2012)      Hyperlipidemia (11/27/2012)      BPH (benign prostatic hypertrophy) (11/27/2012)      Shortness of breath (3/3/2017)      Care Plan  1-Acute on chronic respiratory failure:    -Improved,now off BIPAP    -Stable on nasal cannula oxygen 3 liters with O2 sat 95%  2-COPD exacerbation    -Still wheezing a lot    -Continue solumedrol iv,nebulizer treatment.   3-Pneumonia with staph aureus in sputum culture  4-Bacteremia with strep pneumoniae    -On keflex as per ID  5-Lung CA:    -Oncology following  6-Palliative care following  DVT prophylaxis

## 2017-03-06 NOTE — PROGRESS NOTES
Assumed care of patient from [facility]  Patient restin gin bed. Alert and oriented x4. 2lNC. PIV locked. 0330 report given to Caron.  Patient transferred to

## 2017-03-06 NOTE — CONSULTS
INFECTIOUS DISEASE CONSULT NOTE       Requested by: Dr Margaux Braga    Reason for consult: Pneumonia, BSI    Date of admission: 3/1/2017    Date of consult: March 6, 2017      ABX:     Current abx Prior abx    Keflex 3/6-0 Levaquin 3/1-2     ASSESSMENT: -- RECOMMENDATIONS      Pneumonia- suspect from Strep and Staph  - Blood cx with Strep pneumo and Sputum Cx with MSSA - Will change to Keflex as will cover both isolates and plan 10-14 days of Abx  - d/w Dr Татьяна Kitchen- 1 of 2 blcx 3/1 with Strep pneumo  - repeat Blcx 3/2 ntd - Monitor   Acute on chronic hypoxic resp failure  - required Bipap but off now  - suspect PNA, COPD exacerbation and baseline lung mass contributed - Monitor as improving   Leucocytosis- suspect sec to steroids - monitor   H/o Lung cancer/ mass, s/p Neuroendocrine Carcinoma. Heme/ Onc following    H/o CAD    Co-morb- HTN, OA, PAD          MICROBIOLOGY:   3/1 blcx 1 of 2 Strep Pneumo I PCN  3/2 Blcx x1 ntd, Spcx MSSA R Levaquin, erythro, , C albicans    LINES/DRAINS:   PIV    HPI:     Mr Cece Clark is a 81 y.o. Male with  PMHx of Lung Cancer, HTN, BPH, COPD and arthritis who was brought to ED via EMS on 3/1 because of resp distress. He developed SOB after walking in the parking lot. He also had nonproductive cough. In ED, he was placed on BIPAP with improvement in resp status. He was found with Pneumonia and COPD exacerbation on work up. He was started on Bsabx. He was seen by pulmonary and heme onc. He slowly improved. His Blcx grew Strep pneumo and Spcx with MSSA. He was transferred out of ICU. We were asked for further eval and management.       Past medical history:     Past Medical History:   Diagnosis Date    Arthritis     Arthropathy, unspecified, site unspecified     Asthma     BPH (benign prostatic hyperplasia)     COPD (chronic obstructive pulmonary disease) (HCC)     Hypertension     Microscopic hematuria        Social History:     Social History     Social History    Marital status: LEGALLY      Spouse name: N/A    Number of children: N/A    Years of education: N/A     Occupational History    Not on file. Social History Main Topics    Smoking status: Former Smoker     Packs/day: 0.30     Years: 55.00     Types: Cigarettes     Quit date: 1/1/2004    Smokeless tobacco: Not on file    Alcohol use No    Drug use: No    Sexual activity: Not Currently     Other Topics Concern    Not on file     Social History Narrative    No narrative on file       Family History:     Family History   Problem Relation Age of Onset    Asthma Father        Allergies:   No Known Allergies      Home Medications:     Prescriptions Prior to Admission   Medication Sig    loratadine (CLARITIN) 10 mg tablet Take 10 mg by mouth daily.  GUAIFENESIN/CODEINE PHOSPHATE (ROBITUSSIN A-C PO) Take  by mouth.  predniSONE (DELTASONE) 10 mg tablet Take  by mouth daily (with breakfast).  hydralazine-hydrochlorothiazid 25-25 mg cap Take  by mouth.  aspirin delayed-release (ASPIRIN LOW DOSE) 81 mg tablet Take  by mouth daily.  lovastatin (MEVACOR) 40 mg tablet Take 40 mg by mouth nightly.  ALPRAZolam (XANAX) 0.25 mg tablet Take  by mouth.  ipratropium-albuterol (COMBIVENT)  mcg/actuation inhaler Take  by inhalation every six (6) hours as needed.  therapeutic multivitamin (THERAGRAN) tablet Take 1 Tab by mouth daily.  dutaseride (AVODART) 0.5 mg capsule Take 0.5 mg by mouth daily.  fluticasone-salmeterol (ADVAIR DISKUS) 500-50 mcg/dose diskus inhaler Take 1 Puff by inhalation every twelve (12) hours.  albuterol sulfate (PROVENTIL;VENTOLIN) 2.5 mg/0.5 mL Nebu 2.5 mg by Nebulization route once.  tiotropium (SPIRIVA WITH HANDIHALER) 18 mcg inhalation capsule Take 1 Cap by inhalation daily.  albuterol (PROVENTIL HFA, VENTOLIN HFA) 90 mcg/actuation inhaler Take  by inhalation. Current Medications:     Current Facility-Administered Medications   Medication Dose Route Frequency    hydroCHLOROthiazide (HYDRODIURIL) tablet 25 mg  25 mg Oral DAILY    hydrALAZINE (APRESOLINE) tablet 25 mg  25 mg Oral BID PRN    budesonide (PULMICORT) 500 mcg/2 ml nebulizer suspension  500 mcg Nebulization BID RT    arformoterol (BROVANA) neb solution 15 mcg  15 mcg Nebulization BID RT    dilTIAZem (CARDIZEM) IR tablet 30 mg  30 mg Oral Q6H    methylPREDNISolone (PF) (SOLU-MEDROL) injection 40 mg  40 mg IntraVENous Q12H    levoFLOXacin (LEVAQUIN) tablet 750 mg  750 mg Oral Q24H    pantoprazole (PROTONIX) tablet 40 mg  40 mg Oral DAILY    sodium chloride (OCEAN) 0.65 % nasal spray 2 Spray  2 Spray Both Nostrils PRN    sodium chloride (NS) flush 5-10 mL  5-10 mL IntraVENous Q8H    sodium chloride (NS) flush 5-10 mL  5-10 mL IntraVENous PRN    acetaminophen (TYLENOL) tablet 650 mg  650 mg Oral Q4H PRN    ondansetron (ZOFRAN) injection 4 mg  4 mg IntraVENous Q4H PRN    enoxaparin (LOVENOX) injection 40 mg  40 mg SubCUTAneous Q24H    albuterol-ipratropium (DUO-NEB) 2.5 MG-0.5 MG/3 ML  3 mL Nebulization Q6H RT    aspirin delayed-release tablet 81 mg  81 mg Oral DAILY       Review of Systems:   12 points ROS done. Pertinent positives and negatives are as follows, ROS otherwise negative. Constitutional: Negative for fever, chills, diaphoresis and unexpected weight change. HENT: Negative for ear pain, congestion, sore throat, rhinorrhea. Eyes: Negative for pain, redness and visual disturbance. Respiratory: +ve for shortness of breath, cough, chest tightness and wheezing. Cardiovascular: Negative for chest pain, palpitations and leg swelling.    Gastrointestinal: Negative for nausea, vomiting, abdominal pain or diarrhea  Genitourinary: Negative for dysuria   Musculoskeletal: Negative for back pain, joint pain or muscle aches   Skin: Negative for ulcers, rash  Neurological: Negative for dizziness, syncope, light-headedness or headaches. Hematological:Negative for easy bruising or bleeding. Psychiatric/Behavioral: Negative anxiety, depression. Physical Exam:  Vitals  Temp (24hrs), Av.9 °F (36.6 °C), Min:97.5 °F (36.4 °C), Max:98.3 °F (36.8 °C)    Visit Vitals    /79 (BP 1 Location: Left arm, BP Patient Position: At rest)    Pulse (!) 103    Temp 98.1 °F (36.7 °C)    Resp 16    Ht 5' 6\" (1.676 m)    Wt 67.1 kg (147 lb 14.9 oz)    SpO2 95%    BMI 23.88 kg/m2       General: Fairly developed, poorly nourished, 81y.o. year-old, male, in no acute distress  HEENT: Normocephalic, anicteric sclerae, Pupils equal, round reactive to light, no oropharyngeal lesions, missing teeth. No sinus tenderness. Neck:  Supple, no lymphadenopathy, masses or thyromegaly  Chest:  Symmetrical expansion  Lungs:  Decreased AE on left, no dullness  Heart:  Regular rhythm, no murmurs, rubs or gallops, No JVD  Abdomen: Soft, non-tender,non distended, no organomegaly, BS+  Musculoskeletal: No edema. No clubbing or cyanosis  CNS:               AAOx3. Cranial nerves II-XII intact. Grossly normal.No NR  SKIN:               No skin lesion or rash. Dry, warm, intact          Labs: Results:   Chemistry Recent Labs      17   0430  17   0500   GLU  133*  141*  158*   NA  138  141  141   K  3.9  4.3  4.1   CL  96*  101  102   CO2  33*  28  29   BUN  22*  23*  28*   CREA  0.74  0.80  0.76   CA  8.8  8.7  8.4*   AGAP  9  12  10   BUCR  30*  29*  37*      CBC w/Diff Recent Labs      17   0430  17   0500   WBC  16.1*  15.9*  15.1*   RBC  4.97  4.93  4.41*   HGB  13.2  12.9*  11.6*   HCT  40.0  40.6  35.9*   PLT  441*  482*  381   GRANS  92*  89*  89*   LYMPH  5*  5*  8*   EOS  0  0  0      Microbiology No results for input(s): CULT in the last 72 hours.        Imaging-      Results from [place] encounter on 17   XR CHEST PORT   Narrative EXAM: Chest, portable upright, one view    INDICATION: Shortness of breath    COMPARISON: 3/1/2017    _______________    FINDINGS:    Cardiac silhouette and pulmonary vascularity are normal. The visible spiculated  mass left apex measures about 16 mm, contains 3 fiducials, without change. Interval improving streaky opacities in the lung bases. Lungs are hyperinflated. The right costophrenic angle is sharp, the left costophrenic angle is barely  incompletely included. No pneumothorax.    _______________         Impression IMPRESSION:    Hyperinflation. Improving, nearly resolved bibasilar atelectasis and/or  infiltrate. Left apical mass containing fiducials, without apparent change. Results from Hospital Encounter encounter on 03/01/17   CTA CHEST W WO CONT   Addendum Addendum:   Spiculated mass containing fiducials in the left upper lobe is difficult  to measure due to linear extension of atelectasis or scarring peripherally  from its lateral margin. It measures approximately 2.4 x 1.3 cm. In comparison with  a CT scan an outside institute dated 8/20/2016, the measurements are unchanged. Max Bowers MD 3/3/2017  8:05 AM             Narrative EXAM: CTA Chest    INDICATION: Chest pain    COMPARISON: Single view chest done earlier today. TECHNIQUE: Axial CT imaging from the thoracic inlet through the diaphragm with  intravenous contrast. Coronal and sagittal MIP reformats were generated. One or more dose reduction techniques were used on this CT: automated exposure  control, adjustment of the mAs and/or kVp according to patient's size, and  iterative reconstruction techniques. The specific techniques utilized on this CT  exam have been documented in the patient's electronic medical record.    _______________    FINDINGS:    EXAM QUALITY: Overall exam quality is adequate with good opacification of  pulmonary arteries. Mild respiratory artifact in the lung bases.     PULMONARY ARTERIES: No evidence of pulmonary embolism. MEDIASTINUM: Heart size is normal. No evidence of right heart strain. No  pericardial effusion. There is left ventricular hypertrophy. Atherosclerotic  changes of aorta. Esophagus is unremarkable. LYMPH NODES: There are some nonspecific normal sized superior mediastinal lymph  nodes. No pathologically enlarged adenopathy. AIRWAY: Normal.    LUNGS: There is a spiculated masslike opacity in the left upper lobe with some  high density areas. This probably represent fiducial markers in the left upper  lobe mass. Fiducial markers were seen on chest x-ray done today but were new  compared to prior exams. There are bilateral patchy lower lobe infiltrates which  are also seen on chest x-ray today. These are new finding compared to prior  chest x-rays. There is a tree-in-bud appearance particularly in the right lower  lobe associated with the area of opacity. Patchy density inferior left upper  lobe could represent scarring or small focal acute infiltrate. Similar changes  are present in the right middle lobe No discrete pulmonary nodules other than  that described in the left upper lobe. Emphysematous changes with right upper  lobe scarring. PLEURA: No effusion or pneumothorax    UPPER ABDOMEN: Multiple bilateral simple renal cysts. There is a 1 in the  superior medial right kidney on axial image 236 which is less obviously cystic. This may be secondary to its small size and volume averaging. This measures 15  mm. Hounsfield units are approximately 35 for this particular lesion. Upper  abdominal structures are otherwise unremarkable. OTHER: No acute or aggressive osseous abnormalities identified. _______________         Impression IMPRESSION:    1. No evidence of pulmonary embolism. 2.  Spiculated left upper lobe lung mass with fiducial markers present within it  consistent with lung carcinoma.     3. Bilateral lower lobe patchy infiltrates which are also seen to be new on  chest x-ray done today. Findings are consistent with either pneumonia or  aspiration. 4. COPD with emphysematous changes. 5. Patchy areas of opacity in the inferior left upper lobe and right middle lobe  which could represent scarring or acute areas of infiltrate. 6. Multiple bilateral renal cysts. At least one in the medial upper pole region  of the right kidney is indeterminate. Solid mass in this location cannot be  ruled out.        ---------------------------------------------------------------------------------------------------------------  I have independently examined the patient and reviewed all lab studies and imgaing as well as review of nursing notes and physican notes from the past 24 hours. The plan of care has been discussed with the patient/relative and all questions are answered.        Marino Mary MD  3/6/2017    [facility]  [phone]

## 2017-03-06 NOTE — PROGRESS NOTES
NUTRITION follow-up/Plan of care    RECOMMENDATIONS:   1. Regular diet  2. Ensure BID: Chocolate  3. Monitor weight and PO intake  4. RD to follow    GOALS:   1. Ongoing: PO intake meets >75% of protein/calorie needs by 3/9  2. Ongoing: Maintain weight (+/- 1-2 lb) by 3/13    ASSESSMENT:   Per BMI of 23.9, weight is in the normal classification. PO intake is suboptimal. Labs noted. Nutrition recommendations listed. RD to follow. Nutrition Risk:  [x]   High []  Moderate [] Low    SUBJECTIVE/OBJECTIVE:   Transfer from ICU. He has acute on chronic hypoxic respiratory failure with respiratory distress, acute COPD exacerbation, PNA, bacteremia, lung mass w/ hx of lung cancer. Palliative care is following. Pt reports poor appetite. Observed lunch intake: 25%. Pt states that he drinks chocolate Ensure at home. Discussed food preferences. Will liberalize diet. Information Obtained From:   [x] Chart Review  [x] Patient  [] Family/Caregiver  [] Nurse/Physician   [] Patient Rounds/Interdisciplinary Meeting    Diet: Cardiac  Medications: [x] Reviewed (Solumedrol)   Encounter Diagnoses     ICD-10-CM ICD-9-CM   1. Acute respiratory failure with hypoxia (HCC) J96.01 518.81   2. Acute exacerbation of chronic obstructive pulmonary disease (COPD) ([facility] J44.1 491.21   3. Malignant neoplasm of upper lobe of left lung (HCC) C34.12 162.3   4. Community acquired pneumonia J18.9 5   5. Chronic obstructive pulmonary disease, unspecified COPD type ([facility] J44.9 496   6.  Shortness of breath R06.02 786.05     Past Medical History:   Diagnosis Date    Arthritis     Arthropathy, unspecified, site unspecified     Asthma     BPH (benign prostatic hyperplasia)     COPD (chronic obstructive pulmonary disease) ([facility]     Hypertension     Microscopic hematuria      Labs:    Lab Results   Component Value Date/Time    Sodium 138 03/06/2017 03:05 AM    Potassium 3.9 03/06/2017 03:05 AM    Chloride 96 03/06/2017 03:05 AM    CO2 33 03/06/2017 03:05 AM    Anion gap 9 03/06/2017 03:05 AM    Glucose 133 03/06/2017 03:05 AM    BUN 22 03/06/2017 03:05 AM    Creatinine 0.74 03/06/2017 03:05 AM    Calcium 8.8 03/06/2017 03:05 AM    Magnesium 2.6 03/01/2017 02:00 PM    Phosphorus 2.5 03/01/2017 02:00 PM    Albumin 3.1 03/01/2017 02:00 PM     Anthropometrics: BMI (calculated): 23.9   Last 3 Recorded Weights in this Encounter    03/01/17 2000   Weight: 67.1 kg (147 lb 14.9 oz)      Ht Readings from Last 1 Encounters:   03/01/17 5' 6\" (1.676 m)     []  Weight Loss  []  Weight Gain  []  Weight Stable   [x]  New wt n/a on record     Estimated Nutrition Needs:   2010 Kcals/day  Protein (g): 80 g    Nutrition Problems Identified:   [x] Suboptimal PO intake   [] Food Allergies  [] Difficulty chewing/swallowing/poor dentition  [] Constipation/Diarrhea   [] Nausea/Vomiting   [] None  [] Other:     Plan:   [] Therapeutic Diet  [x]  Obtained/adjusted food preferences/tolerances and/or snacks options   [x]  Supplements added   [] Occupational therapy following for feeding techniques  []  HS snack added   []  Modify diet texture   []  Modify diet for food allergies   []  Educate patient   []  Assist with menu selection   [x]  Monitor PO intake on meal rounds   [x]  Continue inpatient monitoring and intervention   []  Participated in discharge planning/Interdisciplinary rounds/Team meetings   []  Other:     Education Needs:   [] Not appropriate for teaching at this time due to:   [x] Identified and addressed    Nutrition Monitoring and Evaluation:   [x] Continue inpatient monitoring and interventions    [] Other:     Paxton Peguero, [address] [facility] [address]  Pager: [phone]

## 2017-03-07 LAB
ANION GAP BLD CALC-SCNC: 11 MMOL/L (ref 3–18)
BACTERIA SPEC CULT: NORMAL
BASOPHILS # BLD AUTO: 0 K/UL (ref 0–0.06)
BASOPHILS # BLD: 0 % (ref 0–2)
BUN SERPL-MCNC: 22 MG/DL (ref 7–18)
BUN/CREAT SERPL: 29 (ref 12–20)
CALCIUM SERPL-MCNC: 8.6 MG/DL (ref 8.5–10.1)
CHLORIDE SERPL-SCNC: 92 MMOL/L (ref 100–108)
CO2 SERPL-SCNC: 32 MMOL/L (ref 21–32)
CREAT SERPL-MCNC: 0.76 MG/DL (ref 0.6–1.3)
DIFFERENTIAL METHOD BLD: ABNORMAL
EOSINOPHIL # BLD: 0 K/UL (ref 0–0.4)
EOSINOPHIL NFR BLD: 0 % (ref 0–5)
ERYTHROCYTE [DISTWIDTH] IN BLOOD BY AUTOMATED COUNT: 14.1 % (ref 11.6–14.5)
GLUCOSE SERPL-MCNC: 145 MG/DL (ref 74–99)
HCT VFR BLD AUTO: 39.7 % (ref 36–48)
HGB BLD-MCNC: 12.9 G/DL (ref 13–16)
LYMPHOCYTES # BLD AUTO: 3 % (ref 21–52)
LYMPHOCYTES # BLD: 0.6 K/UL (ref 0.9–3.6)
MCH RBC QN AUTO: 26 PG (ref 24–34)
MCHC RBC AUTO-ENTMCNC: 32.5 G/DL (ref 31–37)
MCV RBC AUTO: 80 FL (ref 74–97)
MONOCYTES # BLD: 0.6 K/UL (ref 0.05–1.2)
MONOCYTES NFR BLD AUTO: 4 % (ref 3–10)
NEUTS SEG # BLD: 16 K/UL (ref 1.8–8)
NEUTS SEG NFR BLD AUTO: 93 % (ref 40–73)
PLATELET # BLD AUTO: 450 K/UL (ref 135–420)
PMV BLD AUTO: 9.3 FL (ref 9.2–11.8)
POTASSIUM SERPL-SCNC: 3.5 MMOL/L (ref 3.5–5.5)
RBC # BLD AUTO: 4.96 M/UL (ref 4.7–5.5)
SERVICE CMNT-IMP: NORMAL
SODIUM SERPL-SCNC: 135 MMOL/L (ref 136–145)
WBC # BLD AUTO: 17.2 K/UL (ref 4.6–13.2)

## 2017-03-07 PROCEDURE — 74011250637 HC RX REV CODE- 250/637: Performed by: PHYSICIAN ASSISTANT

## 2017-03-07 PROCEDURE — 65660000000 HC RM CCU STEPDOWN

## 2017-03-07 PROCEDURE — 74011250636 HC RX REV CODE- 250/636: Performed by: PHYSICIAN ASSISTANT

## 2017-03-07 PROCEDURE — 97165 OT EVAL LOW COMPLEX 30 MIN: CPT

## 2017-03-07 PROCEDURE — 74011000250 HC RX REV CODE- 250: Performed by: PHYSICIAN ASSISTANT

## 2017-03-07 PROCEDURE — 74011250636 HC RX REV CODE- 250/636: Performed by: NURSE PRACTITIONER

## 2017-03-07 PROCEDURE — 77030032490 HC SLV COMPR SCD KNE COVD -B

## 2017-03-07 PROCEDURE — 94640 AIRWAY INHALATION TREATMENT: CPT

## 2017-03-07 PROCEDURE — 74011250637 HC RX REV CODE- 250/637: Performed by: INTERNAL MEDICINE

## 2017-03-07 PROCEDURE — 97530 THERAPEUTIC ACTIVITIES: CPT

## 2017-03-07 PROCEDURE — 85025 COMPLETE CBC W/AUTO DIFF WBC: CPT | Performed by: INTERNAL MEDICINE

## 2017-03-07 PROCEDURE — 74011250637 HC RX REV CODE- 250/637: Performed by: FAMILY MEDICINE

## 2017-03-07 PROCEDURE — 36415 COLL VENOUS BLD VENIPUNCTURE: CPT | Performed by: INTERNAL MEDICINE

## 2017-03-07 PROCEDURE — 97535 SELF CARE MNGMENT TRAINING: CPT

## 2017-03-07 PROCEDURE — 77010033678 HC OXYGEN DAILY

## 2017-03-07 PROCEDURE — 74011000250 HC RX REV CODE- 250

## 2017-03-07 PROCEDURE — 80048 BASIC METABOLIC PNL TOTAL CA: CPT | Performed by: INTERNAL MEDICINE

## 2017-03-07 PROCEDURE — 97161 PT EVAL LOW COMPLEX 20 MIN: CPT

## 2017-03-07 PROCEDURE — 74011000250 HC RX REV CODE- 250: Performed by: INTERNAL MEDICINE

## 2017-03-07 RX ORDER — TRAMADOL HYDROCHLORIDE 50 MG/1
50 TABLET ORAL
Status: DISCONTINUED | OUTPATIENT
Start: 2017-03-07 | End: 2017-03-08 | Stop reason: HOSPADM

## 2017-03-07 RX ORDER — METOPROLOL TARTRATE 5 MG/5ML
5 INJECTION INTRAVENOUS ONCE
Status: COMPLETED | OUTPATIENT
Start: 2017-03-07 | End: 2017-03-07

## 2017-03-07 RX ORDER — PREDNISONE 20 MG/1
40 TABLET ORAL
Status: DISCONTINUED | OUTPATIENT
Start: 2017-03-08 | End: 2017-03-08 | Stop reason: HOSPADM

## 2017-03-07 RX ORDER — METOPROLOL TARTRATE 5 MG/5ML
INJECTION INTRAVENOUS
Status: COMPLETED
Start: 2017-03-07 | End: 2017-03-07

## 2017-03-07 RX ADMIN — ARFORMOTEROL TARTRATE 15 MCG: 15 SOLUTION RESPIRATORY (INHALATION) at 20:09

## 2017-03-07 RX ADMIN — CEPHALEXIN 500 MG: 250 CAPSULE ORAL at 05:51

## 2017-03-07 RX ADMIN — DILTIAZEM HYDROCHLORIDE 30 MG: 30 TABLET, FILM COATED ORAL at 23:13

## 2017-03-07 RX ADMIN — METOPROLOL TARTRATE 5 MG: 5 INJECTION INTRAVENOUS at 02:32

## 2017-03-07 RX ADMIN — IPRATROPIUM BROMIDE AND ALBUTEROL SULFATE 3 ML: .5; 3 SOLUTION RESPIRATORY (INHALATION) at 13:46

## 2017-03-07 RX ADMIN — HYDROCHLOROTHIAZIDE 25 MG: 25 TABLET ORAL at 08:59

## 2017-03-07 RX ADMIN — Medication 10 ML: at 23:27

## 2017-03-07 RX ADMIN — Medication 10 ML: at 06:00

## 2017-03-07 RX ADMIN — ENOXAPARIN SODIUM 40 MG: 40 INJECTION SUBCUTANEOUS at 23:13

## 2017-03-07 RX ADMIN — DILTIAZEM HYDROCHLORIDE 30 MG: 30 TABLET, FILM COATED ORAL at 05:51

## 2017-03-07 RX ADMIN — PANTOPRAZOLE SODIUM 40 MG: 40 TABLET, DELAYED RELEASE ORAL at 08:59

## 2017-03-07 RX ADMIN — DILTIAZEM HYDROCHLORIDE 30 MG: 30 TABLET, FILM COATED ORAL at 12:00

## 2017-03-07 RX ADMIN — ACETAMINOPHEN 650 MG: 325 TABLET, FILM COATED ORAL at 04:17

## 2017-03-07 RX ADMIN — CEPHALEXIN 500 MG: 250 CAPSULE ORAL at 23:13

## 2017-03-07 RX ADMIN — CEPHALEXIN 500 MG: 250 CAPSULE ORAL at 12:03

## 2017-03-07 RX ADMIN — ACETAMINOPHEN 650 MG: 325 TABLET, FILM COATED ORAL at 23:19

## 2017-03-07 RX ADMIN — METOROPROLOL TARTRATE 5 MG: 5 INJECTION, SOLUTION INTRAVENOUS at 02:32

## 2017-03-07 RX ADMIN — BUDESONIDE 500 MCG: 0.5 INHALANT RESPIRATORY (INHALATION) at 07:12

## 2017-03-07 RX ADMIN — DILTIAZEM HYDROCHLORIDE 30 MG: 30 TABLET, FILM COATED ORAL at 18:42

## 2017-03-07 RX ADMIN — METHYLPREDNISOLONE SODIUM SUCCINATE: 125 INJECTION, POWDER, FOR SOLUTION INTRAMUSCULAR; INTRAVENOUS at 08:59

## 2017-03-07 RX ADMIN — Medication 10 ML: at 14:00

## 2017-03-07 RX ADMIN — IPRATROPIUM BROMIDE AND ALBUTEROL SULFATE 3 ML: .5; 3 SOLUTION RESPIRATORY (INHALATION) at 20:09

## 2017-03-07 RX ADMIN — TRAMADOL HYDROCHLORIDE 50 MG: 50 TABLET, FILM COATED ORAL at 18:42

## 2017-03-07 RX ADMIN — ARFORMOTEROL TARTRATE 15 MCG: 15 SOLUTION RESPIRATORY (INHALATION) at 07:12

## 2017-03-07 RX ADMIN — IPRATROPIUM BROMIDE AND ALBUTEROL SULFATE 3 ML: .5; 3 SOLUTION RESPIRATORY (INHALATION) at 01:21

## 2017-03-07 RX ADMIN — BUDESONIDE 500 MCG: 0.5 INHALANT RESPIRATORY (INHALATION) at 20:09

## 2017-03-07 RX ADMIN — CEPHALEXIN 500 MG: 250 CAPSULE ORAL at 18:42

## 2017-03-07 RX ADMIN — ASPIRIN 81 MG: 81 TABLET, COATED ORAL at 08:59

## 2017-03-07 NOTE — PROGRESS NOTES
-0715-In patient's room to administer nebulizer treatment. Pt. States that he is unable to handle 3 medications (Brovana, Pulmicort and DUoneb)at once. Pt. Offered to use mask for treatment. Pt. Holland Crain stating is wouldn't make a difference. Therefore, pt was administered the Brovana ( long acting medication) and Pulmicort( steroid). Duoneb was held at this time. Pt.  To receive duoneb as scheduled at 2pm.[facility]

## 2017-03-07 NOTE — PROGRESS NOTES
INFECTIOUS DISEASE FOLLOW UP NOTE :-     Admit Date: 3/1/2017    ABX;      Current  Prior    Keflex 3/6-1 Levaquin 3/1-2     ASSESSMENT: -> RECS     Pneumonia- suspect from Strep and Staph  - Blood cx with Strep pneumo and Sputum Cx with MSSA - cont Keflex covers both isolates and plan 10 days of Abx [ 9 more days ]   BSI- 1 of 2 blcx 3/1 with Strep pneumo  - repeat Blcx 3/2 ntd - Monitor   Acute on chronic hypoxic resp failure  - required Bipap but off now  - suspect PNA, COPD exacerbation and baseline lung mass contributed - Monitor as improving  - chest pain from costochondritis , will give ultram for pain medication as does not have any. Leucocytosis- suspect sec to steroids - monitor  - consider decreasing steroid as no wheezing and wbc worse secondary to it. H/o Lung cancer/ mass, s/p Neuroendocrine Carcinoma. Heme/ Onc following     H/o CAD     Co-morb- HTN, OA, PAD       MICROBIOLOGY:   3/1 blcx 1 of 2 Strep Pneumo I PCN  3/2 Blcx x1 ntd, Spcx MSSA R Levaquin, erythro, , C albicans    LINES AND CATHETERS:   piv     SUBJECTIVE :     Interval notes reviewed. Pt is feeling better but c/o chest pain on left side worse with coughing. Also says that last week he had nasal bleed which resolved but he is afraid that it will happen again here.     MEDICATIONS :     Current Facility-Administered Medications   Medication Dose Route Frequency    traMADol (ULTRAM) tablet 50 mg  50 mg Oral Q6H PRN    cephALEXin (KEFLEX) capsule 500 mg  500 mg Oral Q6H    hydroCHLOROthiazide (HYDRODIURIL) tablet 25 mg  25 mg Oral DAILY    hydrALAZINE (APRESOLINE) tablet 25 mg  25 mg Oral BID PRN    budesonide (PULMICORT) 500 mcg/2 ml nebulizer suspension  500 mcg Nebulization BID RT    arformoterol (BROVANA) neb solution 15 mcg  15 mcg Nebulization BID RT    dilTIAZem (CARDIZEM) IR tablet 30 mg  30 mg Oral Q6H    methylPREDNISolone (PF) (SOLU-MEDROL) injection 40 mg  40 mg IntraVENous Q12H    pantoprazole (PROTONIX) tablet 40 mg  40 mg Oral DAILY    sodium chloride (OCEAN) 0.65 % nasal spray 2 Spray  2 Spray Both Nostrils PRN    sodium chloride (NS) flush 5-10 mL  5-10 mL IntraVENous Q8H    sodium chloride (NS) flush 5-10 mL  5-10 mL IntraVENous PRN    acetaminophen (TYLENOL) tablet 650 mg  650 mg Oral Q4H PRN    ondansetron (ZOFRAN) injection 4 mg  4 mg IntraVENous Q4H PRN    enoxaparin (LOVENOX) injection 40 mg  40 mg SubCUTAneous Q24H    albuterol-ipratropium (DUO-NEB) 2.5 MG-0.5 MG/3 ML  3 mL Nebulization Q6H RT    aspirin delayed-release tablet 81 mg  81 mg Oral DAILY       OBJECTIVE :     Visit Vitals    /79 (BP 1 Location: Right arm, BP Patient Position: At rest)    Pulse 97    Temp 97.5 °F (36.4 °C)    Resp 18    Ht 5' 6\" (1.676 m)    Wt 67.1 kg (147 lb 14.9 oz)    SpO2 97%    BMI 23.88 kg/m2       Temp (24hrs), Av °F (36.7 °C), Min:97.5 °F (36.4 °C), Max:98.5 °F (36.9 °C)    GEN - elderly well built black male not in distress  HEENT - b/l narea with nasal cannula prong with dried old blood on rt nares  RESP- b/l air entry equal and clear, on palpation pt has tenderness at costophrenic joint mid chest on left side.   CVS- s1s2 normal, no murmur   ABD-soft, NT, bs present   EXT-no edema  SKIN -no rash   NEURO - awake, alert, O x3    Labs: Results:   Chemistry Recent Labs      17   0410  17   0305  17   0430   GLU  145*  133*  141*   NA  135*  138  141   K  3.5  3.9  4.3   CL  92*  96*  101   CO2  32  33*  28   BUN  22*  22*  23*   CREA  0.76  0.74  0.80   CA  8.6  8.8  8.7   AGAP  11  9  12   BUCR  29*  30*  29*      CBC w/Diff Recent Labs      17   0410  17   0305  17   0430   WBC  17.2*  16.1*  15.9*   RBC  4.96  4.97  4.93   HGB  12.9*  13.2  12.9*   HCT  39.7  40.0  40.6   PLT  450*  441*  482*   GRANS  93*  92*  89*   LYMPH  3*  5*  5*   EOS  0  0  0 RADIOLOGY :    CXR 3/3 - IMPRESSION:  Hyperinflation. Improving, nearly resolved bibasilar atelectasis and/or  infiltrate. Left apical mass containing fiducials, without apparent change. CTA CHEST 3/1 - IMPRESSION:  1. No evidence of pulmonary embolism. 2. Spiculated left upper lobe lung mass with fiducial markers present within it  consistent with lung carcinoma. 3. Bilateral lower lobe patchy infiltrates which are also seen to be new on  chest x-ray done today. Findings are consistent with either pneumonia or  aspiration. 4. COPD with emphysematous changes. 5. Patchy areas of opacity in the inferior left upper lobe and right middle lobe  which could represent scarring or acute areas of infiltrate. 6. Multiple bilateral renal cysts. At least one in the medial upper pole region  of the right kidney is indeterminate. Solid mass in this location cannot be  ruled out.     Sravan Licona MD  March 7, 2017  [facility] AT THE [facility]  [phone]

## 2017-03-07 NOTE — PROGRESS NOTES
Patient has designated his Daughter in [address] [place] [address] participate in his/her discharge plan and to receive any needed information.      Name: Mariano Naik   Address:  Phone number:

## 2017-03-07 NOTE — PROGRESS NOTES
Hospitalist Progress Note    Subjective:   Daily Progress Note: 3/7/2017 4:00 PM  Admitted for acute copd exacerbation and pneumonia. Sputum grew staph aureus and few candida. Blx cx on 3/1 grew strep pneumoniae,repeated bl cx negative. Both staph and strep are sensitive to keflex and ID elected to d/c levaquin and prescribed keflex. Pt still coughing. Current Facility-Administered Medications   Medication Dose Route Frequency    traMADol (ULTRAM) tablet 50 mg  50 mg Oral Q6H PRN    cephALEXin (KEFLEX) capsule 500 mg  500 mg Oral Q6H    hydroCHLOROthiazide (HYDRODIURIL) tablet 25 mg  25 mg Oral DAILY    hydrALAZINE (APRESOLINE) tablet 25 mg  25 mg Oral BID PRN    budesonide (PULMICORT) 500 mcg/2 ml nebulizer suspension  500 mcg Nebulization BID RT    arformoterol (BROVANA) neb solution 15 mcg  15 mcg Nebulization BID RT    dilTIAZem (CARDIZEM) IR tablet 30 mg  30 mg Oral Q6H    methylPREDNISolone (PF) (SOLU-MEDROL) injection 40 mg  40 mg IntraVENous Q12H    pantoprazole (PROTONIX) tablet 40 mg  40 mg Oral DAILY    sodium chloride (OCEAN) 0.65 % nasal spray 2 Spray  2 Spray Both Nostrils PRN    sodium chloride (NS) flush 5-10 mL  5-10 mL IntraVENous Q8H    sodium chloride (NS) flush 5-10 mL  5-10 mL IntraVENous PRN    acetaminophen (TYLENOL) tablet 650 mg  650 mg Oral Q4H PRN    ondansetron (ZOFRAN) injection 4 mg  4 mg IntraVENous Q4H PRN    enoxaparin (LOVENOX) injection 40 mg  40 mg SubCUTAneous Q24H    albuterol-ipratropium (DUO-NEB) 2.5 MG-0.5 MG/3 ML  3 mL Nebulization Q6H RT    aspirin delayed-release tablet 81 mg  81 mg Oral DAILY        Review of Systems  Still coughing,not at baseline yet.     Objective:     Visit Vitals    /87 (BP 1 Location: Right arm, BP Patient Position: At rest)    Pulse 89    Temp 97.8 °F (36.6 °C)    Resp 18    Ht 5' 6\" (1.676 m)    Wt 67.1 kg (147 lb 14.9 oz)    SpO2 96%    BMI 23.88 kg/m2    O2 Flow Rate (L/min): 3 l/min O2 Device: Nasal cannula    Temp (24hrs), Av °F (36.7 °C), Min:97.5 °F (36.4 °C), Max:98.5 °F (36.9 °C)      701 - 0  In: 225 [P.O.:225]  Out: 1 [Urine:1]  1901 -  0700  In: 5 [P.O.:720]  Out: 600 [Urine:600]  P/E  NAD,sitting in bed talking to family member. Lungs:poor air entry wheezing bilateral,no ronchi  Heart s1s2 nl,no m/g  Abdm:soft,not tender  Extr:no edema,good pedis pulses. Neuro:aaox4. Data Review    Recent Results (from the past 12 hour(s))   CBC WITH AUTOMATED DIFF    Collection Time: 17  4:10 AM   Result Value Ref Range    WBC 17.2 (H) 4.6 - 13.2 K/uL    RBC 4.96 4.70 - 5.50 M/uL    HGB 12.9 (L) 13.0 - 16.0 g/dL    HCT 39.7 36.0 - 48.0 %    MCV 80.0 74.0 - 97.0 FL    MCH 26.0 24.0 - 34.0 PG    MCHC 32.5 31.0 - 37.0 g/dL    RDW 14.1 11.6 - 14.5 %    PLATELET 938 (H) 617 - 420 K/uL    MPV 9.3 9.2 - 11.8 FL    NEUTROPHILS 93 (H) 40 - 73 %    LYMPHOCYTES 3 (L) 21 - 52 %    MONOCYTES 4 3 - 10 %    EOSINOPHILS 0 0 - 5 %    BASOPHILS 0 0 - 2 %    ABS. NEUTROPHILS 16.0 (H) 1.8 - 8.0 K/UL    ABS. LYMPHOCYTES 0.6 (L) 0.9 - 3.6 K/UL    ABS. MONOCYTES 0.6 0.05 - 1.2 K/UL    ABS. EOSINOPHILS 0.0 0.0 - 0.4 K/UL    ABS.  BASOPHILS 0.0 0.0 - 0.06 K/UL    DF AUTOMATED     METABOLIC PANEL, BASIC    Collection Time: 17  4:10 AM   Result Value Ref Range    Sodium 135 (L) 136 - 145 mmol/L    Potassium 3.5 3.5 - 5.5 mmol/L    Chloride 92 (L) 100 - 108 mmol/L    CO2 32 21 - 32 mmol/L    Anion gap 11 3.0 - 18 mmol/L    Glucose 145 (H) 74 - 99 mg/dL    BUN 22 (H) 7.0 - 18 MG/DL    Creatinine 0.76 0.6 - 1.3 MG/DL    BUN/Creatinine ratio 29 (H) 12 - 20      GFR est AA >60 >60 ml/min/1.73m2    GFR est non-AA >60 >60 ml/min/1.73m2    Calcium 8.6 8.5 - 10.1 MG/DL         Assessment/Plan:     Principal Problem:    Respiratory failure with hypoxia (HCC) (3/1/2017)    Active Problems:    Lung mass (2012)      Renal mass (2012)      [facility] (2012) Pulmonary nodules (11/27/2012)      CAD (coronary artery disease) (11/27/2012)      HTN (hypertension) (11/27/2012)      Hyperlipidemia (11/27/2012)      BPH (benign prostatic hypertrophy) (11/27/2012)      Shortness of breath (3/3/2017)      Care Plan  1-Acute on chronic respiratory failure:    -Improved,now off BIPAP    -Stable on nasal cannula oxygen 3 liters with O2 sat 96%  2-COPD exacerbation    -Still wheezing a lot    -Will start on prednisone,nebulizer treatment.   3-Pneumonia with staph aureus in sputum culture  4-Bacteremia with strep pneumoniae    -On keflex as per ID  5-Lung CA:    -Oncology following  6-Palliative care following  DVT prophylaxis

## 2017-03-07 NOTE — PROGRESS NOTES
Problem: Self Care Deficits Care Plan (Adult)  Goal: *Acute Goals and Plan of Care (Insert Text)  Occupational Therapy Goals  Initiated 3/7/2017 within 7 day(s). 1. Patient will perform grooming tasks while standing at sink with supervision/set-up. 2. Patient will perform bathing with modified independence. 3. Patient will perform lower body dressing with modified independence. 4. Patient will perform toilet transfers with modified independence. 5. Patient will perform all aspects of toileting with independence. 6. Patient will participate in upper extremity therapeutic exercise/activities with supervision/set-up for 8 minutes to maintain function/strength of BUEs for ADLs. 7. Patient will utilize energy conservation techniques during functional activities with verbal cues. Outcome: Progressing Towards Goal  OCCUPATIONAL THERAPY EVALUATION     Patient: Deepti Briseno (28 y.o. male)  Date: 3/7/2017  Primary Diagnosis: Respiratory failure with hypoxia (HCC)        Precautions:  Fall, Other (comment) (oxygen)      ASSESSMENT :  Based on the objective data described below, the patient presents with impairments in activity tolerance during all functional tasks which impacts ability to independently participate in ADLs. Patient CGA for all bed mobility and functional transfers. Supervision for LB dressing, toileting, and grooming tasks; min A for container management (during grooming task). Patient transferred to [facility] with CGA; had bowel movement/completed bowel hygiene with supervision. Patient's O2 remained within 97% on 3L; SOB observed during functional tasks/conversation. Patient educated on role of OT, POC, home safety, and strategies for activity tolerance/energy conservation; he verbalized understanding and motivated to work with OT.  Patient will benefit from skilled OT services during acute care stay to maximize safety during functional mobility, increase activity tolerance, and facilitate independence in ADLs. Patient left up in chair with needs within reach; Elda Riley RN made aware. Patient will benefit from skilled intervention to address the above impairments. Patients rehabilitation potential is considered to be Good  Factors which may influence rehabilitation potential include:   [ ]             None noted  [ ]             Mental ability/status  [X]             Medical condition  [ ]             Home/family situation and support systems  [ ]             Safety awareness  [ ]             Pain tolerance/management  [ ]             Other:      Recommendations for nursing: up with assist x1; up in chair 3x a day for meals  Written on communication board: yes  Verbally communicated to: Elda Riley RN          PLAN :  Recommendations and Planned Interventions:  [X]               Self Care Training                  [X]        Therapeutic Activities  [X]               Functional Mobility Training    [ ]        Cognitive Retraining  [X]               Therapeutic Exercises           [X]        Endurance Activities  [X]               Balance Training                   [ ]        Neuromuscular Re-Education  [ ]               Visual/Perceptual Training     [X]   Home Safety Training  [X]               Patient Education                 [X]        Family Training/Education  [ ]               Other (comment):     Frequency/Duration: Patient will be followed by occupational therapy 3-5 times a week to address goals. Discharge Recommendations: Rehab vs. Home Health pending progress  Further Equipment Recommendations for Discharge: bedside commode, shower chair for energy conservation       SUBJECTIVE:   Patient stated I know I need to improve my endurance. \"      OBJECTIVE DATA SUMMARY:       Past Medical History:   Diagnosis Date    Arthritis      Arthropathy, unspecified, site unspecified      Asthma      BPH (benign prostatic hyperplasia)      [facility]      Hypertension      Microscopic hematuria       Past Surgical History:   Procedure Laterality Date    HX AAA REPAIR        HX COLONOSCOPY   2002    HX HERNIA REPAIR   2/6/2004    HX OTHER SURGICAL         Biopsy Prostate    HX OTHER SURGICAL   1/10/2008    HX VASCULAR ACCESS         Barriers to Learning/Limitations: None  Compensate with: visual, verbal, tactile, kinesthetic cues/model     GCODES:  Self Care  Current  CJ= 20-39%   Goal  CI= 1-19%. The severity rating is based on the Quick DASH and Other Functional Assessment, MMT, ROM     Eval Complexity: History: LOW Complexity : Brief history review ; Examination: LOW Complexity : 1-3 performance deficits relating to physical, cognitive , or psychosocial skils that result in activity limitations and / or participation restrictions ; Decision Making:MEDIUM Complexity : Patient may present with comorbidities that affect occupational performnce. Miniml to moderate modification of tasks or assistance (eg, physical or verbal ) with assesment(s) is necessary to enable patient to complete evaluation      Prior Level of Function/Home Situation: Pt was independent with basic self care tasks and functional mobility PTA. Utilized O2 at home on 3L (per patient report). Home Situation  Home Environment: Apartment  # Steps to Enter: 0  One/Two Story Residence: One story  Living Alone: Yes  Support Systems: Family member(s)  Patient Expects to be Discharged to[de-identified] Apartment  Current DME Used/Available at Home: Grab bars, Oxygen, portable  Tub or Shower Type: Tub/Shower combination (has grab bars; no shower chair)  [X]  Right hand dominant          [ ]  Left hand dominant  Cognitive/Behavioral Status:  Neurologic State: Alert  Orientation Level: Oriented X4  Cognition: Appropriate decision making; Appropriate for age attention/concentration; Appropriate safety awareness; Follows commands  Safety/Judgement: Fall prevention      Skin: Intact (BUEs)  Edema: None noted (BUEs)     Vision/Perceptual:    Acuity: Within Defined Limits    Corrective Lenses: Glasses      Coordination:  Coordination: Within functional limits (BUEs)  Fine Motor Skills-Upper: Right Intact; Left Intact    Gross Motor Skills-Upper: Right Intact; Left Intact      Balance:  Sitting: Intact  Sitting - Static: Good (unsupported)  Sitting - Dynamic: Good (unsupported)  Standing: Impaired  Standing - Static: Good  Standing - Dynamic : Fair (Fair+)     Strength:  Strength: Generally decreased, functional (BUEs: approx 3+ to 4/5)     Tone & Sensation:  Tone: Normal (BUEs)  Sensation: Intact (BUEs)     Range of Motion:  AROM: Within functional limits (BUEs: full shoulder/elbow flex)  PROM: Within functional limits (BUEs)     Functional Mobility and Transfers for ADLs:  Bed Mobility:  Supine to Sit: Contact guard assistance  Sit to Supine:  (not assessed; patient left up in chair)  Scooting: Stand-by asssistance      Transfers:  Sit to Stand: Contact guard assistance              Toilet Transfer : Contact guard assistance                ADL Assessment:  Feeding: Setup  Oral Facial Hygiene/Grooming: Minimum assistance (for container management)  Bathing: Minimum assistance (2/2 activity tolerance)  Upper Body Dressing: Supervision  Lower Body Dressing: Supervision  Toileting: Supervision     ADL Intervention:  Grooming  Grooming Assistance: Minimum assistance (for container management - listerine)  Washing Hands: Supervision/set-up  Brushing Teeth: Minimum assistance  Cues: Physical assistance;Verbal cues provided (vc's for pacing)     Toileting  Toileting Assistance: Supervision/set up  Bowel Hygiene: Supervision/set-up  Clothing Management: Supervision/set-up  Adaptive Equipment:  (bedside commode)     Grooming: performed oral hygiene in chair with tray table for energy conservation secondary to SOB during functional tasks/conversation.  Min A to open mouth wash container; completed all other aspects of oral care with set-up/supervision. Toileting: CGA for functional transfer; supervision for clothing management and bowel hygiene. Cognitive Retraining  Safety/Judgement: Fall prevention     Pain:  Pre treatment pain level: 0/10  Post treatment pain level: 0/10  Pain Scale 1: Numeric (0 - 10)  Pain Intensity 1: 0     Activity Tolerance: Fair (2/2 SOB during functional activities)  Please refer to the flowsheet for vital signs taken during this treatment. After treatment:   [X] Patient left in no apparent distress sitting up in chair  [ ] Patient left in no apparent distress in bed  [X] Call bell left within reach  [X] Nursing notified  [ ] Caregiver present  [ ] Bed alarm activated      COMMUNICATION/EDUCATION: Patient educated on role of OT, POC, home safety, and activity tolerance strategies. He verbalized understanding.   [X] Home safety education was provided and the patient/caregiver indicated understanding. [X] Patient/family have participated as able in goal setting and plan of care. [X] Patient/family agree to work toward stated goals and plan of care. [ ] Patient understands intent and goals of therapy, but is neutral about his/her participation. [ ] Patient is unable to participate in goal setting and plan of care.      Thank you for this referral.     Xuan Lira MS OTR/L  Time Calculation: 31 mins

## 2017-03-07 NOTE — PROGRESS NOTES
[address] [facility] [address]. Sumeet Sears paged in regards to patients elevated BP. Orders received to give patient a one time dose of 5 mg of  IV lopressor. Bedside and Verbal shift change report given to KYLAH Valencia (oncoming nurse) by Ajit Em (offgoing nurse). Report included the following information SBAR, Kardex, OR Summary, Intake/Output and Recent Results.

## 2017-03-07 NOTE — PROGRESS NOTES
Hematology/Oncology Chart Note  Gume Miranda  2212/01    History of lung cancer  CT chest stable  Would favor CT chest annually as surveillance, due 3/2018    PNA  Antibiotics    Interval  Still short of breath  Tired  Wants to get stronger  Not constipated      /79 (BP 1 Location: Right arm, BP Patient Position: At rest)  Pulse 99  Temp 97.5 °F (36.4 °C)  Resp 18  Ht 5' 6\" (1.676 m)  Wt 67.1 kg (147 lb 14.9 oz)  SpO2 95%  BMI 23.88 kg/m2    Alert, mild dyspnea  PERRL, nonicteric  OP clear  Decreased BS katerin  Mild exp wheeze katerin  RRR  BS+, distended, tympanitic, NT  No BLE edema        Dimas Levy MD  [facility] [phone]  Cell [phone]

## 2017-03-07 NOTE — PROGRESS NOTES
3889 am meds given, patient in bed, call bell within reach,no distress noted    1314 meds given, patient in bed, visitor present, no acute distress noted MD patel made aware of patient's elevated BP    1923 Bedside and Verbal shift change report given to [address] [place] [address] Johnny Flaherty (oncoming nurse) by Jim (offgoing nurse). Report included the following information SBAR, Kardex and MAR.

## 2017-03-07 NOTE — PROGRESS NOTES
I have called and left a message for the patients daughter in [place] , to discuss the patients discharge disposition. He lives alone and said he uses home oxygen supplied by [facility] [employer]. He states he was independent prior to admission and see Dr Nathanael Urbna for his primary care needs. He has [facility] [employer] as a provider. Patient has been provided a SNF list and  placed in e discharge, he agreed to matching to rehab facilities in [place], however, he states he would prefer to return home with home care. He stated he will select a facility based on those who accept his insurance if he . \" need to go \".  Albert Echeverria RN

## 2017-03-07 NOTE — PROGRESS NOTES
Problem: Mobility Impaired (Adult and Pediatric)  Goal: *Acute Goals and Plan of Care (Insert Text)  STG for 3.7.17 to be completed by 3.14.17 (7 days). 1. Pt will complete supine to/from sit (I) in order to complete EOB activity. 2. Pt will complete sit to/from stand transfer with no AD (I) in prep for ambulation. 3. Pt will ambulate 100 feet with LRAD vs no AD and S in order to negotiate household distances. 4. Pt will negotiate 3 steps with (U) handrail and S in order to enter home. Outcome: Progressing Towards Goal  PHYSICAL THERAPY EVALUATION     Patient: Jae Cervantes (22 y.o. male)  Date: 3/7/2017  Primary Diagnosis: Respiratory failure with hypoxia (HCC)  Precautions: Fall, Other (comment) (oxygen)      ASSESSMENT : 80 yo male presents with impaired functional mobility, decreased activity tolerance, and ambulation dysfunction s/p respiratory failure. Pt educated on role of therapy during acute stay, verbalized understanding. Pt on 3L NC throughout session, SpO2 97% before and after activity, HR ranging . Pt ambulated 5 ft x 1, 3 ft x 2 with no AD and CGA. Pt demo no LOB; however, required seated rest breaks after ambulating short distances due to feelings of SOB. Pt demo trunk flexion with decreased step length and gait speed during ambulation, required VCs for proper posture and hand placement to improve safety with descent during stand to sit transfers. Pt with OT at end of session, Colton Velasquez RN notified. Recommend continued therapy during acute stay.      Eval Complexity: History: MEDIUM  Complexity : 1-2 comorbidities / personal factors will impact the outcome/ POC Exam:LOW Complexity : 1-2 Standardized tests and measures addressing body structure, function, activity limitation and / or participation in recreation  Presentation: MEDIUM Complexity : Evolving with changing characteristics  Clinical Decision Making:Medium Complexity COPD, lung mass, 3L NC Overall Complexity:MEDIUM Recommendations for nursing: up in chair 3x/day, up with 1 person assist, up to [facility]  Written on communication board: Yes  Verbally communicated to: Kerri Reddy RN     Patient will benefit from skilled intervention to address the above impairments. Patients rehabilitation potential is considered to be Guarded  Factors which may influence rehabilitation potential include:   [ ]         None noted  [ ]         Mental ability/status  [X]         Medical condition  [X]         Home/family situation and support systems  [ ]         Safety awareness  [ ]         Pain tolerance/management  [ ]         Other:        PLAN :Recommend continued therapy during acute stay. Recommendations and Planned Interventions:  [X]           Bed Mobility Training             [X]    Neuromuscular Re-Education  [X]           Transfer Training                   [ ]    Orthotic/Prosthetic Training  [X]           Gait Training                          [ ]    Modalities  [X]           Therapeutic Exercises          [ ]    Edema Management/Control  [X]           Therapeutic Activities            [X]    Patient and Family Training/Education  [ ]           Other (comment):     Frequency/Duration: Patient will be followed by physical therapy 3-5 times a week to address goals. Discharge Recommendations: [facility] vs [facility] pending progress  Further Equipment Recommendations for Discharge: bedside commode and rolling walker       SUBJECTIVE:   Patient stated I get short of breath.       OBJECTIVE DATA SUMMARY:       Past Medical History:   Diagnosis Date    Arthritis      Arthropathy, unspecified, site unspecified      Asthma      BPH (benign prostatic hyperplasia)      COPD [facility]chronic obstructive pulmonary disease) ([facility] [address]      Hypertension      Microscopic hematuria       Past Surgical History:   Procedure Laterality Date    HX AAA REPAIR        HX COLONOSCOPY   2002    HX HERNIA REPAIR   2/6/2004    HX OTHER SURGICAL Biopsy Prostate    HX OTHER SURGICAL   1/10/2008    HX VASCULAR ACCESS         Barriers to Learning/Limitations: None  Compensate with: visual, verbal, tactile, kinesthetic cues/model  GCODES(GP):Mobility  Current  CJ= 20-39%   Goal  CI= 1-19%. The severity rating is based on the [facility] Balance Scale  0: Pt performs 25% or less of standing activity (Max assist) CN, 100% impaired. 1: Pt supports self with upper extremities but requires therapist assistance. Pt performs 25-50% of effort (Mod assist) CM, 80% to <100% impaired. 1+: Pt supports self with upper extremities but requires therapist assistance. Pt performs >50% effort. (Min assist). CL, 60% to <80% impaired. 2: Pt supports self independently with both upper extremities (walker, crutches, parallel bars). CL, 60% to <80% impaired. 2+: Pt support self independently with 1 upper extremity (cane, crutch, 1 parallel bar). CK, 40% to <60% impaired. 3: Pt stands without upper extremity support for up to 30 seconds. CK, 40% to <60% impaired. 3+: Pt stands without upper extremity support for 30 seconds or greater. CJ, 20% to <40% impaired. 4: Pt independently moves and returns center of gravity 1-2 inches in one plane. CJ, 20% to <40% impaired. 4+: Pt independently moves and returns center of gravity 1-2 inches in multiple planes. CI, 1% to <20% impaired. 5: Pt independently moves and returns center of gravity in all planes greater than 2 inches. CH, 0% impaired. Prior Level of Function/Home Situation: Pt reports he was mod (I) with use of SPC vs RW for increased distances. Reports he was ambulating within house with no AD.   Home Situation  Home Environment: Apartment  # Steps to Enter: 0  One/Two Story Residence: One story  Living Alone: Yes  Support Systems: Family member(s)  Patient Expects to be Discharged to[de-identified] Apartment  Current DME Used/Available at Home: Grab bars, Oxygen, portable, RW, SPC  Tub or Shower Type: Tub/Shower combination (has grab bars; no shower chair)  Critical Behavior:  Neurologic State: Alert  Orientation Level: Oriented X4  Cognition: Appropriate decision making; Appropriate for age attention/concentration; Appropriate safety awareness; Follows commands  Safety/Judgement: Fall prevention  Psychosocial  Patient Behaviors: Calm; Cooperative  Purposeful Interaction: Yes  Strength:    Strength: Generally decreased, functional  Tone & Sensation:   Tone: Normal  Sensation: Intact  Range Of Motion:  AROM: Within functional limits  PROM: Within functional limits  Functional Mobility:  Bed Mobility:  Supine to Sit: Contact guard assistance  Sit to Supine:  (not assessed; patient left up in chair)  Scooting: Stand-by asssistance  Transfers:  Sit to Stand: Contact guard assistance  Stand to Sit: Contact guard assistance  Balance:   Sitting: Intact  Sitting - Static: Good (unsupported)  Sitting - Dynamic: Good (unsupported)  Standing: Impaired  Standing - Static: Good  Standing - Dynamic : Fair (Fair+)  Ambulation/Gait Training:  Distance (ft):  (5 ft x 1, 3 ft x 2)  Assistive Device:  (none)  Ambulation - Level of Assistance: Contact guard assistance  Gait Description (WDL): Exceptions to WDL  Gait Abnormalities: Decreased step clearance; Step to gait  Base of Support: Center of gravity altered  Speed/Myelne: Slow;Pace decreased (<100 feet/min)  Step Length: Right shortened;Left shortened  Pain:  Pre-treatment level: 0  Location: n/a  Post-treatment level: 0  Activity Tolerance:   Fair, SpO2 97% throughout session, HR ranging . Pt with increased SOB with activity. Please refer to the flowsheet for vital signs taken during this treatment.   After treatment:   [X]         Patient left in no apparent distress sitting up in chair  [ ]         Patient left in no apparent distress in bed  [X]         Call bell left within reach  [X]         Nursing notified  [ ]         Caregiver present  [ ]         Bed alarm activated      COMMUNICATION/EDUCATION:   [X]         Fall prevention education was provided and the patient/caregiver indicated understanding. [X]         Patient/family have participated as able in goal setting and plan of care. [X]         Patient/family agree to work toward stated goals and plan of care. [ ]         Patient understands intent and goals of therapy, but is neutral about his/her participation. [ ]         Patient is unable to participate in goal setting and plan of care.      Thank you for this referral.  Lawyer Calrk PT, DPT   Time Calculation: 23 mins

## 2017-03-07 NOTE — PROGRESS NOTES
0800  During report pt saying he has epigastric pain after coughing and he was been like this since before admission, he said he use 02 at ho,e also and gets SOB on exertion, coughing  From thin to thick, from clear to slight blood tinged. 1000  Sitting on the chair, complained to the doctor, that every time he cough, his abdomen and chest hurts. 1400  Had BM and  Voided, SOB noted. 1845  Been on and off coughing from dry to productive,  thin and clear, SOB on exertion, medicated for pain of abdomen and  Chest  after coughing.

## 2017-03-08 ENCOUNTER — HOSPITAL ENCOUNTER (EMERGENCY)
Age: 82
Discharge: HOME | End: 2017-03-08
Attending: HOSPITALIST
Payer: MEDICARE

## 2017-03-08 VITALS
SYSTOLIC BLOOD PRESSURE: 139 MMHG | RESPIRATION RATE: 16 BRPM | TEMPERATURE: 97.5 F | OXYGEN SATURATION: 99 % | HEART RATE: 94 BPM | DIASTOLIC BLOOD PRESSURE: 81 MMHG

## 2017-03-08 VITALS
RESPIRATION RATE: 18 BRPM | WEIGHT: 147.93 LBS | HEART RATE: 95 BPM | TEMPERATURE: 97.4 F | HEIGHT: 66 IN | DIASTOLIC BLOOD PRESSURE: 75 MMHG | SYSTOLIC BLOOD PRESSURE: 144 MMHG | BODY MASS INDEX: 23.77 KG/M2 | OXYGEN SATURATION: 98 %

## 2017-03-08 DIAGNOSIS — J44.1 COPD EXACERBATION (HCC): Primary | ICD-10-CM

## 2017-03-08 LAB
ANION GAP BLD CALC-SCNC: 11 MMOL/L (ref 3–18)
BACTERIA SPEC CULT: NORMAL
BASOPHILS # BLD AUTO: 0 K/UL (ref 0–0.06)
BASOPHILS # BLD: 0 % (ref 0–2)
BUN SERPL-MCNC: 22 MG/DL (ref 7–18)
BUN/CREAT SERPL: 28 (ref 12–20)
CALCIUM SERPL-MCNC: 8.6 MG/DL (ref 8.5–10.1)
CHLORIDE SERPL-SCNC: 90 MMOL/L (ref 100–108)
CO2 SERPL-SCNC: 34 MMOL/L (ref 21–32)
CREAT SERPL-MCNC: 0.8 MG/DL (ref 0.6–1.3)
DIFFERENTIAL METHOD BLD: ABNORMAL
EOSINOPHIL # BLD: 0 K/UL (ref 0–0.4)
EOSINOPHIL NFR BLD: 0 % (ref 0–5)
ERYTHROCYTE [DISTWIDTH] IN BLOOD BY AUTOMATED COUNT: 14.2 % (ref 11.6–14.5)
GLUCOSE SERPL-MCNC: 103 MG/DL (ref 74–99)
HCT VFR BLD AUTO: 38.8 % (ref 36–48)
HGB BLD-MCNC: 12.8 G/DL (ref 13–16)
LYMPHOCYTES # BLD AUTO: 7 % (ref 21–52)
LYMPHOCYTES # BLD: 1.4 K/UL (ref 0.9–3.6)
MCH RBC QN AUTO: 26.4 PG (ref 24–34)
MCHC RBC AUTO-ENTMCNC: 33 G/DL (ref 31–37)
MCV RBC AUTO: 80 FL (ref 74–97)
MONOCYTES # BLD: 1.1 K/UL (ref 0.05–1.2)
MONOCYTES NFR BLD AUTO: 5 % (ref 3–10)
NEUTS SEG # BLD: 17.7 K/UL (ref 1.8–8)
NEUTS SEG NFR BLD AUTO: 88 % (ref 40–73)
PLATELET # BLD AUTO: 426 K/UL (ref 135–420)
PMV BLD AUTO: 9.4 FL (ref 9.2–11.8)
POTASSIUM SERPL-SCNC: 3.5 MMOL/L (ref 3.5–5.5)
RBC # BLD AUTO: 4.85 M/UL (ref 4.7–5.5)
SERVICE CMNT-IMP: NORMAL
SODIUM SERPL-SCNC: 135 MMOL/L (ref 136–145)
WBC # BLD AUTO: 20.1 K/UL (ref 4.6–13.2)

## 2017-03-08 PROCEDURE — 94760 N-INVAS EAR/PLS OXIMETRY 1: CPT

## 2017-03-08 PROCEDURE — 74011000250 HC RX REV CODE- 250: Performed by: INTERNAL MEDICINE

## 2017-03-08 PROCEDURE — 74011250637 HC RX REV CODE- 250/637: Performed by: INTERNAL MEDICINE

## 2017-03-08 PROCEDURE — 97530 THERAPEUTIC ACTIVITIES: CPT

## 2017-03-08 PROCEDURE — 94640 AIRWAY INHALATION TREATMENT: CPT

## 2017-03-08 PROCEDURE — 97535 SELF CARE MNGMENT TRAINING: CPT

## 2017-03-08 PROCEDURE — 99284 EMERGENCY DEPT VISIT MOD MDM: CPT

## 2017-03-08 PROCEDURE — 97116 GAIT TRAINING THERAPY: CPT

## 2017-03-08 PROCEDURE — 74011636637 HC RX REV CODE- 636/637: Performed by: INTERNAL MEDICINE

## 2017-03-08 PROCEDURE — 85025 COMPLETE CBC W/AUTO DIFF WBC: CPT | Performed by: INTERNAL MEDICINE

## 2017-03-08 PROCEDURE — 36415 COLL VENOUS BLD VENIPUNCTURE: CPT | Performed by: INTERNAL MEDICINE

## 2017-03-08 PROCEDURE — 80048 BASIC METABOLIC PNL TOTAL CA: CPT | Performed by: INTERNAL MEDICINE

## 2017-03-08 PROCEDURE — 75810000275 HC EMERGENCY DEPT VISIT NO LEVEL OF CARE

## 2017-03-08 PROCEDURE — 74011250637 HC RX REV CODE- 250/637: Performed by: FAMILY MEDICINE

## 2017-03-08 PROCEDURE — 77010033678 HC OXYGEN DAILY

## 2017-03-08 PROCEDURE — 74011000250 HC RX REV CODE- 250: Performed by: PHYSICIAN ASSISTANT

## 2017-03-08 PROCEDURE — 74011250637 HC RX REV CODE- 250/637: Performed by: PHYSICIAN ASSISTANT

## 2017-03-08 RX ORDER — PREDNISONE 10 MG/1
10 TABLET ORAL
Qty: 30 TAB | Refills: 0 | Status: SHIPPED
Start: 2017-03-08

## 2017-03-08 RX ORDER — DILTIAZEM HYDROCHLORIDE 30 MG/1
30 TABLET, FILM COATED ORAL EVERY 6 HOURS
Qty: 30 TAB | Refills: 0 | Status: SHIPPED | OUTPATIENT
Start: 2017-03-08

## 2017-03-08 RX ORDER — TRAMADOL HYDROCHLORIDE 50 MG/1
50 TABLET ORAL
Qty: 30 TAB | Refills: 0 | Status: SHIPPED
Start: 2017-03-08

## 2017-03-08 RX ORDER — CEPHALEXIN 500 MG/1
500 CAPSULE ORAL EVERY 6 HOURS
Qty: 12 CAP | Refills: 0 | Status: SHIPPED
Start: 2017-03-08

## 2017-03-08 RX ADMIN — BUDESONIDE 500 MCG: 0.5 INHALANT RESPIRATORY (INHALATION) at 09:28

## 2017-03-08 RX ADMIN — HYDROCHLOROTHIAZIDE 25 MG: 25 TABLET ORAL at 10:04

## 2017-03-08 RX ADMIN — DILTIAZEM HYDROCHLORIDE 30 MG: 30 TABLET, FILM COATED ORAL at 18:16

## 2017-03-08 RX ADMIN — CEPHALEXIN 500 MG: 250 CAPSULE ORAL at 06:14

## 2017-03-08 RX ADMIN — Medication 10 ML: at 06:18

## 2017-03-08 RX ADMIN — IPRATROPIUM BROMIDE AND ALBUTEROL SULFATE 3 ML: .5; 3 SOLUTION RESPIRATORY (INHALATION) at 03:36

## 2017-03-08 RX ADMIN — CEPHALEXIN 500 MG: 250 CAPSULE ORAL at 12:50

## 2017-03-08 RX ADMIN — TRAMADOL HYDROCHLORIDE 50 MG: 50 TABLET, FILM COATED ORAL at 03:36

## 2017-03-08 RX ADMIN — SALINE NASAL SPRAY 2 SPRAY: 1.5 SOLUTION NASAL at 06:20

## 2017-03-08 RX ADMIN — DILTIAZEM HYDROCHLORIDE 30 MG: 30 TABLET, FILM COATED ORAL at 06:14

## 2017-03-08 RX ADMIN — PANTOPRAZOLE SODIUM 40 MG: 40 TABLET, DELAYED RELEASE ORAL at 10:04

## 2017-03-08 RX ADMIN — IPRATROPIUM BROMIDE AND ALBUTEROL SULFATE 3 ML: .5; 3 SOLUTION RESPIRATORY (INHALATION) at 14:13

## 2017-03-08 RX ADMIN — CEPHALEXIN 500 MG: 250 CAPSULE ORAL at 18:15

## 2017-03-08 RX ADMIN — Medication 10 ML: at 15:46

## 2017-03-08 RX ADMIN — PREDNISONE 40 MG: 20 TABLET ORAL at 10:04

## 2017-03-08 RX ADMIN — ARFORMOTEROL TARTRATE 15 MCG: 15 SOLUTION RESPIRATORY (INHALATION) at 09:28

## 2017-03-08 RX ADMIN — ASPIRIN 81 MG: 81 TABLET, COATED ORAL at 10:04

## 2017-03-08 RX ADMIN — DILTIAZEM HYDROCHLORIDE 30 MG: 30 TABLET, FILM COATED ORAL at 12:50

## 2017-03-08 NOTE — PROGRESS NOTES
INFECTIOUS DISEASE FOLLOW UP NOTE :-     Admit Date: 3/1/2017    ABX;      Current  Prior    Keflex 3/6-2 [ D 7 ] Levaquin 3/1-4     ASSESSMENT: -> RECS     Pneumonia- suspect from Strep and Staph  - Blood cx with Strep pneumo and Sputum Cx with MSSA - cont Keflex covers both isolates and plan 10 days of Abx [ 3 more days ]   BSI- 1 of 2 blcx 3/1 with Strep pneumo  - repeat Blcx 3/2 ntd - Monitor   Acute on chronic hypoxic resp failure  - required Bipap but off now  - suspect PNA, COPD exacerbation and baseline lung mass contributed - Monitor as improving  - chest pain from costochondritis, asked pt to ask for pain meds. Leucocytosis- suspect sec to steroids - monitor upto 20K but steroid just decreased today to prednisone. H/o Lung cancer/ mass, s/p Neuroendocrine Carcinoma. Heme/ Onc following     H/o CAD     Co-morb- HTN, OA, PAD       MICROBIOLOGY:   3/1 blcx 1 of 2 Strep Pneumo I PCN  3/2 Blcx x1 ntd, Spcx MSSA R Levaquin, erythro, C albicans    LINES AND CATHETERS:   piv     SUBJECTIVE :     Interval notes reviewed. Pt is feeling not so good today, says that he is having chest tightness and pain with coughing. No fever/chills, coughing but mostly dry.     MEDICATIONS :     Current Facility-Administered Medications   Medication Dose Route Frequency    traMADol (ULTRAM) tablet 50 mg  50 mg Oral Q6H PRN    predniSONE (DELTASONE) tablet 40 mg  40 mg Oral DAILY WITH BREAKFAST    cephALEXin (KEFLEX) capsule 500 mg  500 mg Oral Q6H    hydroCHLOROthiazide (HYDRODIURIL) tablet 25 mg  25 mg Oral DAILY    hydrALAZINE (APRESOLINE) tablet 25 mg  25 mg Oral BID PRN    budesonide (PULMICORT) 500 mcg/2 ml nebulizer suspension  500 mcg Nebulization BID RT    arformoterol (BROVANA) neb solution 15 mcg  15 mcg Nebulization BID RT    dilTIAZem (CARDIZEM) IR tablet 30 mg  30 mg Oral Q6H    pantoprazole (PROTONIX) tablet 40 mg  40 mg Oral DAILY    sodium chloride (OCEAN) 0.65 % nasal spray 2 Spray  2 Spray Both Nostrils PRN    sodium chloride (NS) flush 5-10 mL  5-10 mL IntraVENous Q8H    sodium chloride (NS) flush 5-10 mL  5-10 mL IntraVENous PRN    acetaminophen (TYLENOL) tablet 650 mg  650 mg Oral Q4H PRN    ondansetron (ZOFRAN) injection 4 mg  4 mg IntraVENous Q4H PRN    enoxaparin (LOVENOX) injection 40 mg  40 mg SubCUTAneous Q24H    albuterol-ipratropium (DUO-NEB) 2.5 MG-0.5 MG/3 ML  3 mL Nebulization Q6H RT    aspirin delayed-release tablet 81 mg  81 mg Oral DAILY       OBJECTIVE :     Visit Vitals    /75 (BP 1 Location: Right arm, BP Patient Position: At rest)    Pulse 95    Temp 97.4 °F (36.3 °C)    Resp 18    Ht 5' 6\" (1.676 m)    Wt 67.1 kg (147 lb 14.9 oz)    SpO2 94%    BMI 23.88 kg/m2       Temp (24hrs), Av.6 °F (36.4 °C), Min:97.4 °F (36.3 °C), Max:97.8 °F (36.6 °C)    GEN - elderly well built black male not in distress  HEENT - b/l narea with nasal cannula prong with dried old blood on rt nares  RESP- b/l air entry equal but diminished over bases, mild faint exp wheezing b/l upper lobe, on palpation pt has tenderness at costophrenic joint mid chest on left side.   CVS- s1s2 normal, no murmur   ABD-soft, NT, bs present   EXT-no edema  SKIN -no rash   NEURO - awake, alert, O x3    Labs: Results:   Chemistry Recent Labs      17   0245  17   0410  17   0305   GLU  103*  145*  133*   NA  135*  135*  138   K  3.5  3.5  3.9   CL  90*  92*  96*   CO2  34*  32  33*   BUN  22*  22*  22*   CREA  0.80  0.76  0.74   CA  8.6  8.6  8.8   AGAP  11  11  9   BUCR  28*  29*  30*      CBC w/Diff Recent Labs      17   0245  17   0410  17   0305   WBC  20.1*  17.2*  16.1*   RBC  4.85  4.96  4.97   HGB  12.8*  12.9*  13.2   HCT  38.8  39.7  40.0   PLT  426*  450*  441*   GRANS  88*  93*  92*   LYMPH  7*  3*  5*   EOS  0  0  0          RADIOLOGY :    CXR 3/3 - IMPRESSION:  Hyperinflation. Improving, nearly resolved bibasilar atelectasis and/or  infiltrate. Left apical mass containing fiducials, without apparent change. CTA CHEST 3/1 - IMPRESSION:  1. No evidence of pulmonary embolism. 2. Spiculated left upper lobe lung mass with fiducial markers present within it  consistent with lung carcinoma. 3. Bilateral lower lobe patchy infiltrates which are also seen to be new on  chest x-ray done today. Findings are consistent with either pneumonia or  aspiration. 4. COPD with emphysematous changes. 5. Patchy areas of opacity in the inferior left upper lobe and right middle lobe  which could represent scarring or acute areas of infiltrate. 6. Multiple bilateral renal cysts. At least one in the medial upper pole region  of the right kidney is indeterminate. Solid mass in this location cannot be  ruled out.     Isidro Brand MD  March 8, 2017  [facility] AT THE [facility]  [phone]

## 2017-03-08 NOTE — DISCHARGE SUMMARY
Discharge Summary    Patient: Chavo Stockton               Sex: male          DOA: 3/1/2017         YOB: 1935      Age:  81 y.o.        LOS:  LOS: 7 days                Admit Date: 3/1/2017    Discharge Date: 3/8/2017    Admission Diagnoses: Respiratory failure with hypoxia [facility])    Discharge Diagnoses:   Acute on chronic respiratory failure with hypoxia  Acute COPD exacerbation  Pneumonia  Bacteremia  Hx Lung cancer/Lung mass  CAD  HTN  Leukocytosis      Problem List as of 3/8/2017  Date Reviewed: 7/31/2013          Codes Class Noted - Resolved    Shortness of breath ICD-10-CM: R06.02  ICD-9-CM: 786.05  3/3/2017 - Present        * (Principal)Respiratory failure with hypoxia ([facility] ICD-10-CM: J96.91  ICD-9-CM: 518.81  3/1/2017 - Present        Lung mass (Chronic) ICD-10-CM: R91.8  ICD-9-CM: 786.6  11/27/2012 - Present        Renal mass (Chronic) ICD-10-CM: N28.89  ICD-9-CM: 593.9  11/27/2012 - Present        PAD (peripheral artery disease) (HCC) (Chronic) ICD-10-CM: I73.9  ICD-9-CM: 443.9  11/27/2012 - Present        COPD (chronic obstructive pulmonary disease) ([facility] ICD-10-CM: J44.9  ICD-9-CM: 496  11/27/2012 - Present        Pulmonary nodules (Chronic) ICD-10-CM: R91.8  ICD-9-CM: 793.19  11/27/2012 - Present        CAD (coronary artery disease) (Chronic) ICD-10-CM: I25.10  ICD-9-CM: 414.00  11/27/2012 - Present        HTN (hypertension) (Chronic) ICD-10-CM: I10  ICD-9-CM: 401.9  11/27/2012 - Present        Hyperlipidemia (Chronic) ICD-10-CM: E78.5  ICD-9-CM: 272.4  11/27/2012 - Present        BPH (benign prostatic hypertrophy) (Chronic) ICD-10-CM: N40.0  ICD-9-CM: 600.00  11/27/2012 - Present        RESOLVED: Tracheobronchitis ICD-10-CM: J40  ICD-9-CM: 490  11/27/2012 - 3/1/2017        RESOLVED: Hemoptysis ICD-10-CM: F37.3  ICD-9-CM: 786.30  11/27/2012 - 3/1/2017        RESOLVED: Drainage from left ear ICD-10-CM: H92.12  ICD-9-CM: 388.60  11/27/2012 - 3/1/2017        RESOLVED: Asbestosis(501) ICD-9-CM: 678  11/27/2012 - 3/1/2017        RESOLVED: Laryngitis acute ICD-9-CM: 464.00  11/27/2012 - 3/1/2017        RESOLVED: Leukocytosis ICD-10-CM: G61.479  ICD-9-CM: 288.60  11/27/2012 - 3/1/2017        RESOLVED: Hyponatremia ICD-10-CM: E87.1  ICD-9-CM: 276.1  11/27/2012 - 3/1/2017        RESOLVED: Asthma ICD-10-CM: J45.909  ICD-9-CM: 493.90  11/27/2012 - 3/1/2017        RESOLVED: OA (osteoarthritis) ICD-10-CM: M19.90  ICD-9-CM: 715.90  11/27/2012 - 3/1/2017        RESOLVED: BPH (benign prostatic hypertrophy) with urinary obstruction ICD-10-CM: N40.1, N13.8  ICD-9-CM: 600.01, 599.69  11/27/2012 - 3/1/2017        RESOLVED: Calculus of kidney ICD-10-CM: N20.0  ICD-9-CM: 592.0  11/27/2012 - 3/1/2017        RESOLVED: Urinary frequency ICD-10-CM: R35.0  ICD-9-CM: 788.41  11/27/2012 - 3/1/2017              Discharge Medications:   Keflex 500 mg po qid  Prednisone tapering dose 40 mg daily x 2 days,30 mg po daily x 3 days,20 mg po daily x 3 days,10 mg po daily   Tramadol 50 mg po q 8 hrs. Diltiazem IR 30 mg q 6hrs  Current Discharge Medication List      CONTINUE these medications which have NOT CHANGED    Details   loratadine (CLARITIN) 10 mg tablet Take 10 mg by mouth daily. GUAIFENESIN/CODEINE PHOSPHATE (ROBITUSSIN A-C PO) Take  by mouth.        predniSONE (DELTASONE) 10 mg tablet Take  by mouth daily (with breakfast). hydralazine-hydrochlorothiazid 25-25 mg cap Take  by mouth. aspirin delayed-release (ASPIRIN LOW DOSE) 81 mg tablet Take  by mouth daily. lovastatin (MEVACOR) 40 mg tablet Take 40 mg by mouth nightly. ALPRAZolam (XANAX) 0.25 mg tablet Take  by mouth. ipratropium-albuterol (COMBIVENT)  mcg/actuation inhaler Take  by inhalation every six (6) hours as needed. therapeutic multivitamin (THERAGRAN) tablet Take 1 Tab by mouth daily. dutaseride (AVODART) 0.5 mg capsule Take 0.5 mg by mouth daily.         fluticasone-salmeterol (ADVAIR DISKUS) 500-50 mcg/dose diskus inhaler Take 1 Puff by inhalation every twelve (12) hours. albuterol sulfate (PROVENTIL;VENTOLIN) 2.5 mg/0.5 mL Nebu 2.5 mg by Nebulization route once. tiotropium (SPIRIVA WITH HANDIHALER) 18 mcg inhalation capsule Take 1 Cap by inhalation daily. albuterol (PROVENTIL HFA, VENTOLIN HFA) 90 mcg/actuation inhaler Take  by inhalation. Follow-up:pcp    Discharge Condition:stable    Activity:as tolerated    Diet:cardiac    Labs:  Labs: Results:       Chemistry Recent Labs      03/08/17 0245 03/07/17 0410 03/06/17   0305   GLU  103*  145*  133*   NA  135*  135*  138   K  3.5  3.5  3.9   CL  90*  92*  96*   CO2  34*  32  33*   BUN  22*  22*  22*   CREA  0.80  0.76  0.74   CA  8.6  8.6  8.8   AGAP  11  11  9   BUCR  28*  29*  30*      CBC w/Diff Recent Labs      03/08/17 0245 03/07/17 0410 03/06/17   0305   WBC  20.1*  17.2*  16.1*   RBC  4.85  4.96  4.97   HGB  12.8*  12.9*  13.2   HCT  38.8  39.7  40.0   PLT  426*  450*  441*   GRANS  88*  93*  92*   LYMPH  7*  3*  5*   EOS  0  0  0      Cardiac Enzymes No results for input(s): CPK, CKND1, MICH in the last 72 hours. No lab exists for component: CKRMB, TROIP   Coagulation No results for input(s): PTP, INR, APTT in the last 72 hours. No lab exists for component: INREXT, INREXT    Lipid Panel No results found for: CHOL, CHOLPOCT, CHOLX, CHLST, CHOLV, [id number], HDL, LDL, NLDLCT, DLDL, LDLC, DLDLP, 488693, VLDLC, VLDL, TGL, TGLX, TRIGL, [id number], TRIGP, TGLPOCT, [id number], CHHD, CHHDX   BNP No results for input(s): BNPP in the last 72 hours. Liver Enzymes No results for input(s): TP, ALB, TBIL, AP, SGOT, GPT in the last 72 hours. No lab exists for component: DBIL   Thyroid Studies No results found for: T4, T3U, TSH, TSHEXT, TSHEXT       Imaging:  CTA chest on 3/1/2017:  1. No evidence of pulmonary embolism.   2. Spiculated left upper lobe lung mass with fiducial markers present within it  consistent with lung carcinoma. 3. Bilateral lower lobe patchy infiltrates which are also seen to be new on  chest x-ray done today. Findings are consistent with either pneumonia or  aspiration. 4. COPD with emphysematous changes. 5. Patchy areas of opacity in the inferior left upper lobe and right middle lobe  which could represent scarring or acute areas of infiltrate. 6. Multiple bilateral renal cysts. At least one in the medial upper pole region  of the right kidney is indeterminate. Solid mass in this location cannot be  ruled out. CXR on 3/1/2017:  1. Probable COPD with new bilateral lower lobe areas of infiltrate. Differential  diagnosis includes pneumonia, pulmonary edema in the face of severe COPD and  Aspiration. Consults:   Hemo-oncology  ID    Treatment Team: Treatment Team: Attending Provider: Hermilo Harrison MD; Consulting Provider: Eduardo Mccauley MD; Physician Assistant: DEBORAH Sabillon; Consulting Provider: Sade Leary MD; Utilization Review: Sheila Baumann RN; Consulting Provider: Bailey Orellana MD; Consulting Provider: Lucy Ceron NP; Consulting Provider: Xochitl Villar MD; Care Manager: Teresa Rangel; Occupational Therapy Assistant: EDENILSON Hopper; Physical Therapy Assistant: Ginger Lanes, PTA    Significant Diagnostic Studies:  Sputum cx on 3/2/2017:  Culture result: RARE   STAPHYLOCOCCUS AUREUS    (A)    Final     Culture result: FEW   CANDIDA TROPICALIS           Blood culture on 3/1/2017  Culture result:  (A)    Final      AEROBIC AND ANAEROBIC BOTTLES   STREPTOCOCCUS PNEUMONIAE       Hospital Course:  Clau Schmitt is a 81 y.o. male with a hx of COPD, Chronic hypoxic respiratory failure on Home O2 3L NC, HTN, BPH, ? Remote history of lung CA who was admitted to [facility] for acute hypoxic respiratory failure and COPD exacerbation. He complained of getting short of breath about 1pm the day of ER visit after walking across a parking lot.  He was in distress upon EMS's and was tripoding in the parking lot. He denied any chest pain. He also noted non-productive cough. He declined to answer any other questions for while on BIPAP. He did not know his home medications and declined to discuss them. He was more comfortable on BIPAP in the ER. CXR showed bilateral lower lobes infiltrate. CTA of chest was negative for pneumonia. He was admitted to [facility] for further evaluation and treatment. He was on nebulizer treatment,solumedrol iv,levaquin and vancomycin. Pulmonary was involved. Oncology was consulted for Lung mass with hx Lung cancer and saw patient on 3/2017. He advised patient that he can follow up with him as outpatient at [facility]([phone]). On 3/1/2017,blood cx grew strep pneumonia. The repeated blood cx on 3/2 came back negative. Sputum cx on 3/2/2017 grew Staph aureus. On 3/6 as patient breathing improved and tolerated NC oxygen,he was transferred out of stepdown to [address] [place] [address].ID saw patient on 3/6 and put patient on keflex since the strep pneumoniae from blood cx and staph aurues from sputum cx were both sensitive to keflex. Patient is now on 3 liters oxygen which correnponds to his Home oxygen. He is clinically stable and will be discharged to a SNF. P/E  NAD. Lungs ctab  Heart s1s2 nl  Extr:no edema  Neuro:aaox3    Time for discharge:40 minutes.     Devi Torres MD  March 8, 2017

## 2017-03-08 NOTE — DISCHARGE SUMMARY
Discharge Summary    Patient: Clark Ruiz               Sex: male          DOA: 3/1/2017         YOB: 1935      Age:  81 y.o.        LOS:  LOS: 7 days                Admit Date: 3/1/2017    Discharge Date: 3/8/2017    Admission Diagnoses: Respiratory failure with hypoxia [facility])    Discharge Diagnoses:   Acute on chronic respiratory failure with hypoxia  Acute COPD exacerbation  Pneumonia  Bacteremia  Hx Lung cancer/Lung mass  CAD  HTN  Leukocytosis      Problem List as of 3/8/2017  Date Reviewed: 7/31/2013          Codes Class Noted - Resolved    Shortness of breath ICD-10-CM: R06.02  ICD-9-CM: 786.05  3/3/2017 - Present        * (Principal)Respiratory failure with hypoxia ([facility].) ICD-10-CM: J96.91  ICD-9-CM: 518.81  3/1/2017 - Present        Lung mass (Chronic) ICD-10-CM: R91.8  ICD-9-CM: 786.6  11/27/2012 - Present        Renal mass (Chronic) ICD-10-CM: N28.89  ICD-9-CM: 593.9  11/27/2012 - Present        PAD (peripheral artery disease) (HCC) (Chronic) ICD-10-CM: I73.9  ICD-9-CM: 443.9  11/27/2012 - Present        COPD (chronic obstructive pulmonary disease) ([facility].) ICD-10-CM: J44.9  ICD-9-CM: 496  11/27/2012 - Present        Pulmonary nodules (Chronic) ICD-10-CM: R91.8  ICD-9-CM: 793.19  11/27/2012 - Present        CAD (coronary artery disease) (Chronic) ICD-10-CM: I25.10  ICD-9-CM: 414.00  11/27/2012 - Present        HTN (hypertension) (Chronic) ICD-10-CM: I10  ICD-9-CM: 401.9  11/27/2012 - Present        Hyperlipidemia (Chronic) ICD-10-CM: E78.5  ICD-9-CM: 272.4  11/27/2012 - Present        BPH (benign prostatic hypertrophy) (Chronic) ICD-10-CM: N40.0  ICD-9-CM: 600.00  11/27/2012 - Present        RESOLVED: Tracheobronchitis ICD-10-CM: J40  ICD-9-CM: 490  11/27/2012 - 3/1/2017        RESOLVED: Hemoptysis ICD-10-CM: W71.7  ICD-9-CM: 786.30  11/27/2012 - 3/1/2017        RESOLVED: Drainage from left ear ICD-10-CM: H92.12  ICD-9-CM: 388.60  11/27/2012 - 3/1/2017        RESOLVED: Asbestosis(501) ICD-9-CM: 421  11/27/2012 - 3/1/2017        RESOLVED: Laryngitis acute ICD-9-CM: 464.00  11/27/2012 - 3/1/2017        RESOLVED: Leukocytosis ICD-10-CM: U82.855  ICD-9-CM: 288.60  11/27/2012 - 3/1/2017        RESOLVED: Hyponatremia ICD-10-CM: E87.1  ICD-9-CM: 276.1  11/27/2012 - 3/1/2017        RESOLVED: Asthma ICD-10-CM: J45.909  ICD-9-CM: 493.90  11/27/2012 - 3/1/2017        RESOLVED: OA (osteoarthritis) ICD-10-CM: M19.90  ICD-9-CM: 715.90  11/27/2012 - 3/1/2017        RESOLVED: BPH (benign prostatic hypertrophy) with urinary obstruction ICD-10-CM: N40.1, N13.8  ICD-9-CM: 600.01, 599.69  11/27/2012 - 3/1/2017        RESOLVED: Calculus of kidney ICD-10-CM: N20.0  ICD-9-CM: 592.0  11/27/2012 - 3/1/2017        RESOLVED: Urinary frequency ICD-10-CM: R35.0  ICD-9-CM: 788.41  11/27/2012 - 3/1/2017              Discharge Medications:   Keflex 500 mg po qid  Prednisone tapering dose 40 mg daily x 2 days,30 mg po daily x 3 days,20 mg po daily x 3 days,10 mg po daily x 3 days  Tramadol 50 mg po q 8 hrs. Current Discharge Medication List      CONTINUE these medications which have NOT CHANGED    Details   loratadine (CLARITIN) 10 mg tablet Take 10 mg by mouth daily. GUAIFENESIN/CODEINE PHOSPHATE (ROBITUSSIN A-C PO) Take  by mouth.        predniSONE (DELTASONE) 10 mg tablet Take  by mouth daily (with breakfast). hydralazine-hydrochlorothiazid 25-25 mg cap Take  by mouth. aspirin delayed-release (ASPIRIN LOW DOSE) 81 mg tablet Take  by mouth daily. lovastatin (MEVACOR) 40 mg tablet Take 40 mg by mouth nightly. ALPRAZolam (XANAX) 0.25 mg tablet Take  by mouth. ipratropium-albuterol (COMBIVENT)  mcg/actuation inhaler Take  by inhalation every six (6) hours as needed. therapeutic multivitamin (THERAGRAN) tablet Take 1 Tab by mouth daily. dutaseride (AVODART) 0.5 mg capsule Take 0.5 mg by mouth daily.         fluticasone-salmeterol (ADVAIR DISKUS) 500-50 mcg/dose diskus inhaler Take 1 Puff by inhalation every twelve (12) hours. albuterol sulfate (PROVENTIL;VENTOLIN) 2.5 mg/0.5 mL Nebu 2.5 mg by Nebulization route once. tiotropium (SPIRIVA WITH HANDIHALER) 18 mcg inhalation capsule Take 1 Cap by inhalation daily. albuterol (PROVENTIL HFA, VENTOLIN HFA) 90 mcg/actuation inhaler Take  by inhalation. Follow-up:pcp    Discharge Condition:stable    Activity:as tolerated    Diet:cardiac    Labs:  Labs: Results:       Chemistry Recent Labs      03/08/17 0245 03/07/17 0410 03/06/17   0305   GLU  103*  145*  133*   NA  135*  135*  138   K  3.5  3.5  3.9   CL  90*  92*  96*   CO2  34*  32  33*   BUN  22*  22*  22*   CREA  0.80  0.76  0.74   CA  8.6  8.6  8.8   AGAP  11  11  9   BUCR  28*  29*  30*      CBC w/Diff Recent Labs      03/08/17 0245 03/07/17 0410 03/06/17   0305   WBC  20.1*  17.2*  16.1*   RBC  4.85  4.96  4.97   HGB  12.8*  12.9*  13.2   HCT  38.8  39.7  40.0   PLT  426*  450*  441*   GRANS  88*  93*  92*   LYMPH  7*  3*  5*   EOS  0  0  0      Cardiac Enzymes No results for input(s): CPK, CKND1, MICH in the last 72 hours. No lab exists for component: CKRMB, TROIP   Coagulation No results for input(s): PTP, INR, APTT in the last 72 hours. No lab exists for component: INREXT    Lipid Panel No results found for: CHOL, CHOLPOCT, CHOLX, CHLST, CHOLV, [id number], HDL, LDL, NLDLCT, DLDL, LDLC, DLDLP, 544434, VLDLC, VLDL, TGL, TGLX, TRIGL, [id number], TRIGP, TGLPOCT, [id number], CHHD, CHHDX   BNP No results for input(s): BNPP in the last 72 hours. Liver Enzymes No results for input(s): TP, ALB, TBIL, AP, SGOT, GPT in the last 72 hours. No lab exists for component: DBIL   Thyroid Studies No results found for: T4, T3U, TSH, TSHEXT       Imaging:  CTA chest on 3/1/2017:  1. No evidence of pulmonary embolism. 2. Spiculated left upper lobe lung mass with fiducial markers present within it  consistent with lung carcinoma.   3. Bilateral lower lobe patchy infiltrates which are also seen to be new on  chest x-ray done today. Findings are consistent with either pneumonia or  aspiration. 4. COPD with emphysematous changes. 5. Patchy areas of opacity in the inferior left upper lobe and right middle lobe  which could represent scarring or acute areas of infiltrate. 6. Multiple bilateral renal cysts. At least one in the medial upper pole region  of the right kidney is indeterminate. Solid mass in this location cannot be  ruled out. CXR on 3/1/2017:  1. Probable COPD with new bilateral lower lobe areas of infiltrate. Differential  diagnosis includes pneumonia, pulmonary edema in the face of severe COPD and  Aspiration. Consults:   Hemo-oncology  ID    Treatment Team: Treatment Team: Attending Provider: Amarjit Beyer MD; Consulting Provider: Dianna Michelle MD; Physician Assistant: DEBORAH Aldridge; Consulting Provider: Jose Salazar MD; Utilization Review: Amanda Jimenez RN; Consulting Provider: Konrad Yu MD; Consulting Provider: Allyson [place] NP; Consulting Provider: Shanon Miranda MD; Care Manager: Venessa Wheeler; Occupational Therapy Assistant: EDENILSON Martini; Physical Therapy Assistant: Robert Walters PTA    Significant Diagnostic Studies:  Sputum cx on 3/2/2017:  Culture result: RARE   STAPHYLOCOCCUS AUREUS    (A)    Final     Culture result: FEW   CANDIDA TROPICALIS           Blood culture on 3/1/2017  Culture result:  (A)    Final      AEROBIC AND ANAEROBIC BOTTLES   STREPTOCOCCUS PNEUMONIAE       Hospital Course:  Chasity Lang is a 81 y.o. male with a hx of COPD, Chronic hypoxic respiratory failure on Home O2 3L NC, HTN, BPH, ? Remote history of lung CA who was admitted to [facility] on 3/1/17 for acute hypoxic respiratory failure and COPD exacerbation. He complained of getting short of breath about 1pm the day of ER visit after walking across a parking lot.  He was in distress upon EMS's and was tripoding in the parking lot. He denied any chest pain. He also noted non-productive cough. He declined to answer any other questions for while on BIPAP. He did not know his home medications and declined to discuss them. He was more comfortable on BIPAP in the ER. CXR showed bilateral lower lobes infiltrate. CTA of chest was negative for pneumonia. He was admitted to [facility] for further evaluation and treatment. He was on nebulizer treatment,solumedrol iv,levaquin and vancomycin. Pulmonary was involved. Oncology was consulted for Lung mass with hx Lung cancer and saw patient on 3/2017. He advised patient that he can follow up with him as outpatient at [facility]([phone]). On 3/1/2017,blood cx grew strep pneumonia. The repeated blood cx on 3/2 came back negative. Sputum cx on 3/2/2017 grew Staph aureus. On 3/6 as patient breathing improved and tolerated NC oxygen,he was transferred out of stepdown to [address] [place] [address].ID saw patient on 3/6 and put patient on keflex since the strep pneumoniae from blood cx and staph aurues from sputum cx were both sensitive to keflex. Patient is now on 3 liters oxygen which correnponds to his Home oxygen. He is clinically stable and will be discharged to a SNF. P/E  NAD. Lungs ctab  Heart s1s2 nl  Extr:no edema  Neuro:aaox3    Time for discharge:40 minutes.     Nikhil Kiran MD  March 8, 2017

## 2017-03-08 NOTE — DISCHARGE INSTRUCTIONS
DISCHARGE SUMMARY from Nurse    The following personal items are in your possession at time of discharge:    Dental Appliances: None  Visual Aid: With patient, Glasses     Home Medications: None  Jewelry: Watch, With patient  Clothing: Sent home  Other Valuables: None             PATIENT INSTRUCTIONS:    After general anesthesia or intravenous sedation, for 24 hours or while taking prescription Narcotics:  · Limit your activities  · Do not drive and operate hazardous machinery  · Do not make important personal or business decisions  · Do  not drink alcoholic beverages  · If you have not urinated within 8 hours after discharge, please contact your surgeon on call. Report the following to your surgeon:  · Excessive pain, swelling, redness or odor of or around the surgical area  · Temperature over 100.5  · Nausea and vomiting lasting longer than 4 hours or if unable to take medications  · Any signs of decreased circulation or nerve impairment to extremity: change in color, persistent  numbness, tingling, coldness or increase pain  · Any questions        What to do at Home:  Recommended activity: Activity as tolerated. If you experience any of the following symptoms shortness of breath chest pain, difficulty breathing, please follow up with MD Desean Landeros. *  Please give a list of your current medications to your Primary Care Provider. *  Please update this list whenever your medications are discontinued, doses are      changed, or new medications (including over-the-counter products) are added. *  Please carry medication information at all times in case of emergency situations. These are general instructions for a healthy lifestyle:    No smoking/ No tobacco products/ Avoid exposure to second hand smoke    Surgeon General's Warning:  Quitting smoking now greatly reduces serious risk to your health.     Obesity, smoking, and sedentary lifestyle greatly increases your risk for illness    A healthy diet, regular physical exercise & weight monitoring are important for maintaining a healthy lifestyle    You may be retaining fluid if you have a history of heart failure or if you experience any of the following symptoms:  Weight gain of 3 pounds or more overnight or 5 pounds in a week, increased swelling in our hands or feet or shortness of breath while lying flat in bed. Please call your doctor as soon as you notice any of these symptoms; do not wait until your next office visit. Recognize signs and symptoms of STROKE:    F-face looks uneven    A-arms unable to move or move unevenly    S-speech slurred or non-existent    T-time-call 911 as soon as signs and symptoms begin-DO NOT go       Back to bed or wait to see if you get better-TIME IS BRAIN. Warning Signs of HEART ATTACK     Call 911 if you have these symptoms:   Chest discomfort. Most heart attacks involve discomfort in the center of the chest that lasts more than a few minutes, or that goes away and comes back. It can feel like uncomfortable pressure, squeezing, fullness, or pain.  Discomfort in other areas of the upper body. Symptoms can include pain or discomfort in one or both arms, the back, neck, jaw, or stomach.  Shortness of breath with or without chest discomfort.  Other signs may include breaking out in a cold sweat, nausea, or lightheadedness. Don't wait more than five minutes to call 911 - MINUTES MATTER! Fast action can save your life. Calling 911 is almost always the fastest way to get lifesaving treatment. Emergency Medical Services staff can begin treatment when they arrive -- up to an hour sooner than if someone gets to the hospital by car. The discharge information has been reviewed with the patient. The patient verbalized understanding. Discharge medications reviewed with the patient and appropriate educational materials and side effects teaching were provided.         Patient armband removed and shredded

## 2017-03-08 NOTE — PROGRESS NOTES
Care Management Interventions  PCP Verified by CM:  Yes  Palliative Care Consult (Criteria: CHF and RRAT>21): No  Reason for No Palliative Care Consult: Patient declined palliative services at this time  Mode of Transport at Discharge: [address]  [address] Time of Discharge: 1800  Transition of Care Consult (CM Consult): SNF  MyChart Signup: No  Discharge Durable Medical Equipment: No  Physical Therapy Consult: Yes  Occupational Therapy Consult: Yes  Speech Therapy Consult: No  Current Support Network: Nursing Facility  Confirm Follow Up Transport: Other (see comment)  Plan discussed with Pt/Family/Caregiver: Yes  Freedom of Choice Offered: Yes  Discharge Location  Discharge Placement: Skilled nursing facility

## 2017-03-08 NOTE — PROGRESS NOTES
Problem: Mobility Impaired (Adult and Pediatric)  Goal: *Acute Goals and Plan of Care (Insert Text)  STG for 3.7.17 to be completed by 3.14.17 (7 days). 1. Pt will complete supine to/from sit (I) in order to complete EOB activity. 2. Pt will complete sit to/from stand transfer with no AD (I) in prep for ambulation. 3. Pt will ambulate 100 feet with LRAD vs no AD and S in order to negotiate household distances. 4. Pt will negotiate 3 steps with (U) handrail and S in order to enter home. Outcome: Progressing Towards Goal  PHYSICAL THERAPY TREATMENT     Patient: Jae Cervantes (11 y.o. male)  Date: 3/8/2017  Diagnosis: Respiratory failure with hypoxia (HCC) Respiratory failure with hypoxia (HCC)  Precautions: Fall, Other (comment) (oxygen)   Chart, physical therapy assessment, plan of care and goals were reviewed. ASSESSMENT:  SpO2 97% at rest, 95% s/p gait on 3L. Pt ambulated in room on 3L and with RW, reciprocal gt pattern, decreased clint, no LOB or path deviations. Pt left sitting in chair. Education: build tolerance to activity gradually   Progression toward goals:  [ ]      Improving appropriately and progressing toward goals  [X]      Improving slowly and progressing toward goals  [ ]      Not making progress toward goals and plan of care will be adjusted       PLAN:  Patient continues to benefit from skilled intervention to address the above impairments. Continue treatment per established plan of care. Discharge Recommendations:  Home Health vs None  Further Equipment Recommendations for Discharge:  Pt uses SPC at home       SUBJECTIVE:   Patient stated I am ready to get out of here.       OBJECTIVE DATA SUMMARY:   Critical Behavior:  Neurologic State: Alert, Eyes open to voice, Eyes open spontaneously  Orientation Level: Oriented X4  Cognition: Appropriate decision making, Appropriate for age attention/concentration, Appropriate safety awareness, Follows commands  Safety/Judgement: Fall prevention  Functional Mobility Training:  Bed Mobility:  Supine to Sit: Supervision  Transfers:  Sit to Stand: Stand-by asssistance  Stand to Sit: Stand-by asssistance  Balance:  Sitting: Intact  Sitting - Static: Good (unsupported)  Sitting - Dynamic: Good (unsupported)  Standing: Intact; With support  Standing - Static: Good  Standing - Dynamic : Good  Ambulation/Gait Training:  Distance (ft): 40 Feet (ft)  Assistive Device: Gait belt;Walker, rolling  Ambulation - Level of Assistance: Stand-by asssistance;Contact guard assistance  Pain:  Pre 0  Post 0  Pain Scale 1: Numeric (0 - 10)     Activity Tolerance:   Fair+  Please refer to the flowsheet for vital signs taken during this treatment.   After treatment:   [X] Patient left in no apparent distress sitting up in chair  [ ] Patient left in no apparent distress in bed  [X] Call bell left within reach  [ ] Nursing notified  [ ] Caregiver present  [ ] Bed alarm activated      [address], PTA   Time Calculation: 12 mins

## 2017-03-08 NOTE — PROGRESS NOTES
Problem: Self Care Deficits Care Plan (Adult)  Goal: *Acute Goals and Plan of Care (Insert Text)  Occupational Therapy Goals  Initiated 3/7/2017 within 7 day(s). 1. Patient will perform grooming tasks while standing at sink with supervision/set-up. 2. Patient will perform bathing with modified independence. 3. Patient will perform lower body dressing with modified independence. 4. Patient will perform toilet transfers with modified independence. 5. Patient will perform all aspects of toileting with independence. 6. Patient will participate in upper extremity therapeutic exercise/activities with supervision/set-up for 8 minutes to maintain function/strength of BUEs for ADLs. 7. Patient will utilize energy conservation techniques during functional activities with verbal cues. Outcome: Progressing Towards Goal  OCCUPATIONAL THERAPY TREATMENT     Patient: Anabella Alegria (15 y.o. male)  Date: 3/8/2017  Diagnosis: Respiratory failure with hypoxia (HCC) Respiratory failure with hypoxia (HCC)       Precautions: Fall, Other (comment) (oxygen)  Chart, occupational therapy assessment, plan of care, and goals were reviewed. ASSESSMENT:  Pt OOB seated in chair upon entry. Agreeable to OT and functional mobility training w/RW to bathroom. Pt requires mod vc's for safety and tolerates standing ~ 10 minutes performing ADL grooming tasks. Pt c/o blister at L nare, advised that I will alert NSG. Pt requires standing rest break w/activity, and maintains O2 sats 96% on 3L O2 nc. Encouraged IS.  EDUCATION Pt educated on safety w/RW and functional mobility w/O2 tubing mgt  Progression toward goals:  [X]          Improving appropriately and progressing toward goals  [ ]          Improving slowly and progressing toward goals  [ ]          Not making progress toward goals and plan of care will be adjusted       PLAN:  Patient continues to benefit from skilled intervention to address the above impairments.  Continue treatment per established plan of care. Discharge Recommendations:  Minh Clemons  Further Equipment Recommendations for Discharge:  shower chair and rolling walker, as determined by SNF staff       SUBJECTIVE:   Patient stated My oxygen won't reach to the bathroom.       OBJECTIVE DATA SUMMARY:         Cognitive/Behavioral Status:  Neurologic State: Alert  Orientation Level: Oriented X4  Cognition: Appropriate for age attention/concentration, Follows commands  Safety/Judgement: Fall prevention  Functional Mobility and Transfers for ADLs:              Transfers:  Sit to Stand: Supervision (w/RW)  Bed to Chair: Contact guard assistance (w/RW)              Bathroom Mobility: Contact guard assistance (w/RW)              Balance:  Sitting: Intact  Sitting - Static: Good (unsupported)  Sitting - Dynamic: Good (unsupported)  Standing: Intact; With support  Standing - Static: Good  Standing - Dynamic : Fair (fair/fair plus)  ADL Intervention:  Grooming  Washing Face: Contact guard assistance  Washing Hands: Contact guard assistance  Brushing Teeth: Minimum assistance     Pain:  Pre Treatment:0  Post Treatment:0     Activity Tolerance:    Good     Please refer to the flowsheet for vital signs taken during this treatment.   After treatment:   [X]  Patient left in no apparent distress sitting up in chair  [ ]  Patient left in no apparent distress in bed  [X]  Call bell left within reach  [ ]  Nursing notified  [ ]  Caregiver present  [ ]  Bed alarm activated     EDENILSON Dunn  Time Calculation: 31 mins

## 2017-03-08 NOTE — PROGRESS NOTES
Spoke to the patient and he agreed to allow me to speak to his daughter. He has now agreed to allow [address]  to work on authorization. Spoke to [facility] with [facility]. I have explained to the patient that [facility] is working on authorization and if approved he will be able to go to rehab. He understood and agreed as did his daughter. I explained that if he is not accepted to rehab he will  need to return home.   Marko Chase RN

## 2017-03-08 NOTE — PROGRESS NOTES
1251 meds given, patient sitting in chair, call bell within reach, no distress noted    1539 informed by  Gio Shane patient transport will arrive at  discharge instruction given, patient verbalized understanding, teach back utilized, patient given, discharge paperwork     [phone] transport at bedside     1900 3rd attempt called to give report, on hold to give report     Nataly Hope report given including patient's meds, report given to Kavya at [facility]. Kavya stated \"instructed  Transport to wait outside facility\", patient can stay here tonight request  call tomorrow or hospitalist with updated patient's home med list.  Gamaliel Kennedy informed nurse \"since the patient is alert and oriented I can review meds with the patient. \"  Made aware summary as well as scripts faxed to facility at [place] [address] Verbal shift change report given to Kavya RN (receiving nurse at [facility]) by Sun Meredith RN (offgoing nurse). Report included the following information Adebayo BOWEN and MAR.     2007 spoke to Kavya at [facility], informed transport left facility and transported patient to ED, informed this nurse med list will be updated then patient will be transported back to facility.

## 2017-03-08 NOTE — ANCILLARY DISCHARGE INSTRUCTIONS
Patient and/or next of kin has been given the [facility] Important Message From Medicare About Your Rights\" letter and all questions were answered.

## 2017-03-08 NOTE — PROGRESS NOTES
Patient has been authorized for admission to [address] [place] [address] Dyke Candelario Called and informed the patients daughter. DC summary in e discharge. [facility] to transport at 600 pm . PCS form  and check list to nurses station.  Misti Rosas RN

## 2017-03-08 NOTE — ROUTINE PROCESS
Bedside and Verbal shift change report given to manish   (oncoming nurse) by Desiree Gómez RN   (offgoing nurse). Report included the following information SBAR, Kardex, Intake/Output, MAR and Recent Results.

## 2017-03-09 NOTE — ED TRIAGE NOTES
Pt was taken to the nursing facility. They did not accept him, they said his medications were wrong and was sent back here.

## 2019-04-14 ENCOUNTER — APPOINTMENT (OUTPATIENT)
Dept: GENERAL RADIOLOGY | Age: 84
End: 2019-04-14
Attending: EMERGENCY MEDICINE
Payer: MEDICARE

## 2019-04-14 ENCOUNTER — HOSPITAL ENCOUNTER (EMERGENCY)
Age: 84
Discharge: HOME | End: 2019-04-14
Attending: EMERGENCY MEDICINE
Payer: MEDICARE

## 2019-04-14 VITALS
TEMPERATURE: 98.2 F | WEIGHT: 145 LBS | SYSTOLIC BLOOD PRESSURE: 185 MMHG | HEART RATE: 98 BPM | OXYGEN SATURATION: 96 % | HEIGHT: 67 IN | BODY MASS INDEX: 22.76 KG/M2 | RESPIRATION RATE: 18 BRPM | DIASTOLIC BLOOD PRESSURE: 90 MMHG

## 2019-04-14 DIAGNOSIS — R10.84 ABDOMINAL PAIN, GENERALIZED: ICD-10-CM

## 2019-04-14 DIAGNOSIS — R11.2 NON-INTRACTABLE VOMITING WITH NAUSEA, UNSPECIFIED VOMITING TYPE: Primary | ICD-10-CM

## 2019-04-14 LAB
ALBUMIN SERPL-MCNC: 3.8 G/DL (ref 3.4–5)
ALBUMIN/GLOB SERPL: 1.4 {RATIO} (ref 0.8–1.7)
ALP SERPL-CCNC: 56 U/L (ref 45–117)
ALT SERPL-CCNC: 32 U/L (ref 16–61)
ANION GAP SERPL CALC-SCNC: 10 MMOL/L (ref 3–18)
AST SERPL-CCNC: 32 U/L (ref 15–37)
BASOPHILS # BLD: 0 K/UL (ref 0–0.1)
BASOPHILS NFR BLD: 0 % (ref 0–2)
BILIRUB DIRECT SERPL-MCNC: 0.4 MG/DL (ref 0–0.2)
BILIRUB SERPL-MCNC: 1.7 MG/DL (ref 0.2–1)
BUN SERPL-MCNC: 18 MG/DL (ref 7–18)
BUN/CREAT SERPL: 18 (ref 12–20)
CALCIUM SERPL-MCNC: 9 MG/DL (ref 8.5–10.1)
CHLORIDE SERPL-SCNC: 91 MMOL/L (ref 100–108)
CK MB CFR SERPL CALC: 2.8 % (ref 0–4)
CK MB SERPL-MCNC: 6.6 NG/ML (ref 5–25)
CK SERPL-CCNC: 236 U/L (ref 39–308)
CO2 SERPL-SCNC: 32 MMOL/L (ref 21–32)
CREAT SERPL-MCNC: 0.99 MG/DL (ref 0.6–1.3)
DIFFERENTIAL METHOD BLD: ABNORMAL
EOSINOPHIL # BLD: 0.1 K/UL (ref 0–0.4)
EOSINOPHIL NFR BLD: 1 % (ref 0–5)
ERYTHROCYTE [DISTWIDTH] IN BLOOD BY AUTOMATED COUNT: 14.6 % (ref 11.6–14.5)
GLOBULIN SER CALC-MCNC: 2.7 G/DL (ref 2–4)
GLUCOSE SERPL-MCNC: 148 MG/DL (ref 74–99)
HCT VFR BLD AUTO: 48.6 % (ref 36–48)
HGB BLD-MCNC: 16.3 G/DL (ref 13–16)
LIPASE SERPL-CCNC: 56 U/L (ref 73–393)
LYMPHOCYTES # BLD: 1.4 K/UL (ref 0.9–3.6)
LYMPHOCYTES NFR BLD: 13 % (ref 21–52)
MCH RBC QN AUTO: 26.8 PG (ref 24–34)
MCHC RBC AUTO-ENTMCNC: 33.5 G/DL (ref 31–37)
MCV RBC AUTO: 79.8 FL (ref 74–97)
MONOCYTES # BLD: 1 K/UL (ref 0.05–1.2)
MONOCYTES NFR BLD: 10 % (ref 3–10)
NEUTS SEG # BLD: 8.2 K/UL (ref 1.8–8)
NEUTS SEG NFR BLD: 76 % (ref 40–73)
PLATELET # BLD AUTO: 208 K/UL (ref 135–420)
PMV BLD AUTO: 9.7 FL (ref 9.2–11.8)
POTASSIUM SERPL-SCNC: 2.6 MMOL/L (ref 3.5–5.5)
PROT SERPL-MCNC: 6.5 G/DL (ref 6.4–8.2)
RBC # BLD AUTO: 6.09 M/UL (ref 4.7–5.5)
SODIUM SERPL-SCNC: 133 MMOL/L (ref 136–145)
TROPONIN I SERPL-MCNC: <0.02 NG/ML (ref 0–0.04)
WBC # BLD AUTO: 10.7 K/UL (ref 4.6–13.2)

## 2019-04-14 PROCEDURE — 93005 ELECTROCARDIOGRAM TRACING: CPT

## 2019-04-14 PROCEDURE — 74011250636 HC RX REV CODE- 250/636: Performed by: EMERGENCY MEDICINE

## 2019-04-14 PROCEDURE — 83690 ASSAY OF LIPASE: CPT

## 2019-04-14 PROCEDURE — 85025 COMPLETE CBC W/AUTO DIFF WBC: CPT

## 2019-04-14 PROCEDURE — 80076 HEPATIC FUNCTION PANEL: CPT

## 2019-04-14 PROCEDURE — 99285 EMERGENCY DEPT VISIT HI MDM: CPT

## 2019-04-14 PROCEDURE — 71045 X-RAY EXAM CHEST 1 VIEW: CPT

## 2019-04-14 PROCEDURE — 80048 BASIC METABOLIC PNL TOTAL CA: CPT

## 2019-04-14 PROCEDURE — 82550 ASSAY OF CK (CPK): CPT

## 2019-04-14 PROCEDURE — 96375 TX/PRO/DX INJ NEW DRUG ADDON: CPT

## 2019-04-14 PROCEDURE — 74011250637 HC RX REV CODE- 250/637: Performed by: EMERGENCY MEDICINE

## 2019-04-14 PROCEDURE — 74011000250 HC RX REV CODE- 250: Performed by: EMERGENCY MEDICINE

## 2019-04-14 PROCEDURE — 96374 THER/PROPH/DIAG INJ IV PUSH: CPT

## 2019-04-14 PROCEDURE — 94640 AIRWAY INHALATION TREATMENT: CPT

## 2019-04-14 PROCEDURE — 77030029684 HC NEB SM VOL KT MONA -A

## 2019-04-14 RX ORDER — POTASSIUM CHLORIDE 20 MEQ/1
40 TABLET, EXTENDED RELEASE ORAL
Status: COMPLETED | OUTPATIENT
Start: 2019-04-14 | End: 2019-04-14

## 2019-04-14 RX ORDER — IPRATROPIUM BROMIDE AND ALBUTEROL SULFATE 2.5; .5 MG/3ML; MG/3ML
3 SOLUTION RESPIRATORY (INHALATION) ONCE
Status: COMPLETED | OUTPATIENT
Start: 2019-04-14 | End: 2019-04-14

## 2019-04-14 RX ORDER — ONDANSETRON 2 MG/ML
4 INJECTION INTRAMUSCULAR; INTRAVENOUS
Status: COMPLETED | OUTPATIENT
Start: 2019-04-14 | End: 2019-04-14

## 2019-04-14 RX ORDER — IPRATROPIUM BROMIDE AND ALBUTEROL SULFATE 2.5; .5 MG/3ML; MG/3ML
3 SOLUTION RESPIRATORY (INHALATION) ONCE
Status: DISCONTINUED | OUTPATIENT
Start: 2019-04-14 | End: 2019-04-14 | Stop reason: HOSPADM

## 2019-04-14 RX ORDER — ONDANSETRON 4 MG/1
8 TABLET, FILM COATED ORAL
Qty: 12 TAB | Refills: 0 | Status: SHIPPED | OUTPATIENT
Start: 2019-04-14

## 2019-04-14 RX ORDER — MORPHINE SULFATE 4 MG/ML
4 INJECTION INTRAVENOUS
Status: COMPLETED | OUTPATIENT
Start: 2019-04-14 | End: 2019-04-14

## 2019-04-14 RX ORDER — IPRATROPIUM BROMIDE AND ALBUTEROL SULFATE 2.5; .5 MG/3ML; MG/3ML
3 SOLUTION RESPIRATORY (INHALATION)
Status: COMPLETED | OUTPATIENT
Start: 2019-04-14 | End: 2019-04-14

## 2019-04-14 RX ADMIN — IPRATROPIUM BROMIDE AND ALBUTEROL SULFATE 3 ML: .5; 3 SOLUTION RESPIRATORY (INHALATION) at 18:37

## 2019-04-14 RX ADMIN — POTASSIUM CHLORIDE 40 MEQ: 20 TABLET, EXTENDED RELEASE ORAL at 19:26

## 2019-04-14 RX ADMIN — IPRATROPIUM BROMIDE AND ALBUTEROL SULFATE 3 ML: .5; 3 SOLUTION RESPIRATORY (INHALATION) at 16:17

## 2019-04-14 RX ADMIN — ONDANSETRON 4 MG: 2 INJECTION INTRAMUSCULAR; INTRAVENOUS at 18:37

## 2019-04-14 RX ADMIN — MORPHINE SULFATE 4 MG: 4 INJECTION INTRAVENOUS at 18:37

## 2019-04-14 NOTE — DISCHARGE INSTRUCTIONS
SPECIFIC PATIENT INSTRUCTIONS FROM THE PHYSICIAN WHO TREATED YOU IN THE ER TODAY:  1. Return to the emergency department for worsening of symptoms or for any new symptoms which you want evaluated. 2. Zofran for nausea and/or vomiting. 3. Follow up with your doctor in the next 3-4 days for reevaluation. Patient Education        Abdominal Pain: Care Instructions  Your Care Instructions    Abdominal pain has many possible causes. Some aren't serious and get better on their own in a few days. Others need more testing and treatment. If your pain continues or gets worse, you need to be rechecked and may need more tests to find out what is wrong. You may need surgery to correct the problem. Don't ignore new symptoms, such as fever, nausea and vomiting, urination problems, pain that gets worse, and dizziness. These may be signs of a more serious problem. Your doctor may have recommended a follow-up visit in the next 8 to 12 hours. If you are not getting better, you may need more tests or treatment. The doctor has checked you carefully, but problems can develop later. If you notice any problems or new symptoms, get medical treatment right away. Follow-up care is a key part of your treatment and safety. Be sure to make and go to all appointments, and call your doctor if you are having problems. It's also a good idea to know your test results and keep a list of the medicines you take. How can you care for yourself at home? · Rest until you feel better. · To prevent dehydration, drink plenty of fluids, enough so that your urine is light yellow or clear like water. Choose water and other caffeine-free clear liquids until you feel better. If you have kidney, heart, or liver disease and have to limit fluids, talk with your doctor before you increase the amount of fluids you drink. · If your stomach is upset, eat mild foods, such as rice, dry toast or crackers, bananas, and applesauce.  Try eating several small meals instead of two or three large ones. · Wait until 48 hours after all symptoms have gone away before you have spicy foods, alcohol, and drinks that contain caffeine. · Do not eat foods that are high in fat. · Avoid anti-inflammatory medicines such as aspirin, ibuprofen (Advil, Motrin), and naproxen (Aleve). These can cause stomach upset. Talk to your doctor if you take daily aspirin for another health problem. When should you call for help? Call 911 anytime you think you may need emergency care. For example, call if:    · You passed out (lost consciousness).     · You pass maroon or very bloody stools.     · You vomit blood or what looks like coffee grounds.     · You have new, severe belly pain.    Call your doctor now or seek immediate medical care if:    · Your pain gets worse, especially if it becomes focused in one area of your belly.     · You have a new or higher fever.     · Your stools are black and look like tar, or they have streaks of blood.     · You have unexpected vaginal bleeding.     · You have symptoms of a urinary tract infection. These may include:  ? Pain when you urinate. ? Urinating more often than usual.  ? Blood in your urine.     · You are dizzy or lightheaded, or you feel like you may faint.    Watch closely for changes in your health, and be sure to contact your doctor if:    · You are not getting better after 1 day (24 hours). Where can you learn more? Go to http://richa-mechelle.info/. Enter B033 in the search box to learn more about \"Abdominal Pain: Care Instructions. \"  Current as of: September 23, 2018  Content Version: 11.9  © 5777-7760 [employer]. Care instructions adapted under license by [employer] (which disclaims liability or warranty for this information).  If you have questions about a medical condition or this instruction, always ask your healthcare professional. [address] disclaims any warranty or liability for your use of this information. Patient Education        Nausea and Vomiting: Care Instructions  Your Care Instructions    When you are nauseated, you may feel weak and sweaty and notice a lot of saliva in your mouth. Nausea often leads to vomiting. Most of the time you do not need to worry about nausea and vomiting, but they can be signs of other illnesses. Two common causes of nausea and vomiting are stomach flu and food poisoning. Nausea and vomiting from viral stomach flu will usually start to improve within 24 hours. Nausea and vomiting from food poisoning may last from 12 to 48 hours. The doctor has checked you carefully, but problems can develop later. If you notice any problems or new symptoms, get medical treatment right away. Follow-up care is a key part of your treatment and safety. Be sure to make and go to all appointments, and call your doctor if you are having problems. It's also a good idea to know your test results and keep a list of the medicines you take. How can you care for yourself at home? · To prevent dehydration, drink plenty of fluids, enough so that your urine is light yellow or clear like water. Choose water and other caffeine-free clear liquids until you feel better. If you have kidney, heart, or liver disease and have to limit fluids, talk with your doctor before you increase the amount of fluids you drink. · Rest in bed until you feel better. · When you are able to eat, try clear soups, mild foods, and liquids until all symptoms are gone for 12 to 48 hours. Other good choices include dry toast, crackers, cooked cereal, and gelatin dessert, such as Jell-O. When should you call for help? Call 911 anytime you think you may need emergency care. For example, call if:    · You passed out (lost consciousness).    Call your doctor now or seek immediate medical care if:    · You have symptoms of dehydration, such as:  ? Dry eyes and a dry mouth.   ? Passing only a little dark urine. ? Feeling thirstier than usual.     · You have new or worsening belly pain.     · You have a new or higher fever.     · You vomit blood or what looks like coffee grounds.    Watch closely for changes in your health, and be sure to contact your doctor if:    · You have ongoing nausea and vomiting.     · Your vomiting is getting worse.     · Your vomiting lasts longer than 2 days.     · You are not getting better as expected. Where can you learn more? Go to http://richa-mechelle.info/. Enter 25 [id number] in the search box to learn more about \"Nausea and Vomiting: Care Instructions. \"  Current as of: 2018  Content Version: 11.9  © 0972-5301 [employer]. Care instructions adapted under license by [employer] (which disclaims liability or warranty for this information). If you have questions about a medical condition or this instruction, always ask your healthcare professional. [place] [address] any warranty or liability for your use of this information. Angelfish Activation    Thank you for requesting access to Angelfish. Please follow the instructions below to securely access and download your online medical record. Angelfish allows you to send messages to your doctor, view your test results, renew your prescriptions, schedule appointments, and more. How Do I Sign Up? 1. In your internet browser, go to [email or web address]. 2. Click on the First Time User? Click Here link in the Sign In box. You will see the New Member Sign Up page. 3. Enter your Angelfish Access Code exactly as it appears below. You will not need to use this code after youve completed the sign-up process. If you do not sign up before the expiration date, you must request a new code. Angelfish Access Code: [email or web address]  Expires: 2019  3:49 PM (This is the date your Angelfish access code will )    4.  Enter the last four digits of your Social Security Number (xxxx) and Date of Birth (mm/dd/yyyy) as indicated and click Submit. You will be taken to the next sign-up page. 5. Create a Mouth Party ID. This will be your Mouth Party login ID and cannot be changed, so think of one that is secure and easy to remember. 6. Create a Mouth Party password. You can change your password at any time. 7. Enter your Password Reset Question and Answer. This can be used at a later time if you forget your password. 8. Enter your e-mail address. You will receive e-mail notification when new information is available in [address] 9. Click Sign Up. You can now view and download portions of your medical record. 10. Click the Download Summary menu link to download a portable copy of your medical information. Additional Information    If you have questions, please visit the Frequently Asked Questions section of the Mouth Party website at [email or web address] Remember, Mouth Party is NOT to be used for urgent needs. For medical emergencies, dial 911.

## 2019-04-14 NOTE — ED PROVIDER NOTES
[facility] EMERGENCY DEPT 
 
 
4:19 PM 
 
Date: 4/14/2019 Patient Name: Domitila Romo History of Presenting Illness Chief Complaint Patient presents with  Shortness of Breath  Vomiting  Wheezing 81 y.o. male with noted past medical history who presents to the emergency department graph the patient states that 1.5 days ago he started to have some nausea and vomiting with diffuse abdominal pain that continues to the present. He denies any diarrhea and his last bowel movement was yesterday morning. He has no fever and chills no UTI symptoms   Currently ER he is nauseated still has the pain as described above. Patient has history of COPD and states that he is mildly more short of breath and has baseline. Patient denies any other associated signs or symptoms. Patient denies any other complaints. Nursing notes regarding the HPI and triage nursing notes were reviewed. Prior medical records were reviewed. Past History Past Medical History: 
Past Medical History:  
Diagnosis Date  Chronic obstructive pulmonary disease ([address] Past Surgical History: 
History reviewed. No pertinent surgical history. Family History: 
History reviewed. No pertinent family history. Social History: 
Social History Tobacco Use  Smoking status: Light Tobacco Smoker  Smokeless tobacco: Never Used Substance Use Topics  Alcohol use: Yes  Drug use: Not Currently Allergies: 
No Known Allergies Patient's primary care provider (as noted in EPIC):  None Review of Systems Constitutional: Negative for diaphoresis. HENT: Negative for congestion. Eyes: Negative for discharge. Respiratory: Positive for shortness of breath. Negative for stridor. Cardiovascular: Negative for palpitations. Gastrointestinal: Positive for abdominal pain and vomiting. Negative for diarrhea. Endocrine: Negative for heat intolerance. Genitourinary: Negative for flank pain. Musculoskeletal: Negative for back pain. Neurological: Negative for weakness. Psychiatric/Behavioral: Negative for hallucinations. All other systems reviewed and are negative. Visit Vitals /83 (BP 1 Location: Right arm, BP Patient Position: Sitting) Pulse (!) 108 Temp 98.2 °F (36.8 °C) Resp 18 Ht 5' 7\" (1.702 m) Wt 65.8 kg (145 lb) SpO2 98% BMI 22.71 kg/m² PHYSICAL EXAM: 
CONSTITUTIONAL:  Alert, in no apparent distress;  well developed;  well nourished. HEAD:  Normocephalic, atraumatic. EYES:  EOMI. Non-icteric sclera. Normal conjunctiva. ENTM:  Nose:  no rhinorrhea. Throat:  no erythema or exudate, mucous membranes moist. 
NECK:  No JVD. Supple RESPIRATORY: Decreased air movement but no rales rhonchi or wheeze appreciated. CARDIOVASCULAR:  Regular rate and rhythm. No murmurs, rubs, or gallops. GI:  Normal bowel sounds, abdomen soft a mild diffuse abdominal tenderness to palpation. .  No rebound or guarding. BACK:  Non-tender. UPPER EXT:  Normal inspection. LOWER EXT:  No edema, no calf tenderness. Distal pulses intact. NEURO:  Moves all four extremities, and grossly normal motor exam. 
SKIN:  No rashes;  Normal for age. PSYCH:  Alert and normal affect. DIFFERENTIAL DIAGNOSES/ MEDICAL DECISION MAKING: 
Gastritis, gerd, peptic ulcer disease, cholecystitis, pancreatitis, gastroenteritis, constipation related pain, atypical appendicitis pain, obstruction, urinary tract infection, appendicitis, diverticulitis, constipation related pain, versus combination of the above and/or numerous other processes/ etiologies. Diagnostic Study Results Abnormal lab results from this emergency department encounter: 
Labs Reviewed CBC WITH AUTOMATED DIFF - Abnormal; Notable for the following components:  
    Result Value RBC 6.09 (*) HGB 16.3 (*) HCT 48.6 (*)   
 RDW 14.6 (*) NEUTROPHILS 76 (*) LYMPHOCYTES 13 (*)   
 ABS. NEUTROPHILS 8.2 (*) All other components within normal limits METABOLIC PANEL, BASIC - Abnormal; Notable for the following components:  
 Sodium 133 (*) Potassium 2.6 (*) Chloride 91 (*) Glucose 148 (*) All other components within normal limits CARDIAC PANEL,(CK, CKMB & TROPONIN) - Abnormal; Notable for the following components:  
 CK - MB 6.6 (*) All other components within normal limits HEPATIC FUNCTION PANEL - Abnormal; Notable for the following components:  
 Bilirubin, total 1.7 (*) Bilirubin, direct 0.4 (*) All other components within normal limits LIPASE - Abnormal; Notable for the following components:  
 Lipase 56 (*) All other components within normal limits Lab values for this patient within approximately the last 12 hours: 
Recent Results (from the past 12 hour(s)) CBC WITH AUTOMATED DIFF Collection Time: 04/14/19  3:44 PM  
Result Value Ref Range WBC 10.7 4.6 - 13.2 K/uL  
 RBC 6.09 (H) 4.70 - 5.50 M/uL  
 HGB 16.3 (H) 13.0 - 16.0 g/dL HCT 48.6 (H) 36.0 - 48.0 % MCV 79.8 74.0 - 97.0 FL  
 MCH 26.8 24.0 - 34.0 PG  
 MCHC 33.5 31.0 - 37.0 g/dL  
 RDW 14.6 (H) 11.6 - 14.5 % PLATELET 022 828 - 888 K/uL MPV 9.7 9.2 - 11.8 FL  
 NEUTROPHILS 76 (H) 40 - 73 % LYMPHOCYTES 13 (L) 21 - 52 % MONOCYTES 10 3 - 10 % EOSINOPHILS 1 0 - 5 % BASOPHILS 0 0 - 2 %  
 ABS. NEUTROPHILS 8.2 (H) 1.8 - 8.0 K/UL  
 ABS. LYMPHOCYTES 1.4 0.9 - 3.6 K/UL  
 ABS. MONOCYTES 1.0 0.05 - 1.2 K/UL  
 ABS. EOSINOPHILS 0.1 0.0 - 0.4 K/UL  
 ABS. BASOPHILS 0.0 0.0 - 0.1 K/UL  
 DF AUTOMATED METABOLIC PANEL, BASIC Collection Time: 04/14/19  3:44 PM  
Result Value Ref Range Sodium 133 (L) 136 - 145 mmol/L Potassium 2.6 (LL) 3.5 - 5.5 mmol/L Chloride 91 (L) 100 - 108 mmol/L  
 CO2 32 21 - 32 mmol/L Anion gap 10 3.0 - 18 mmol/L Glucose 148 (H) 74 - 99 mg/dL  BUN 18 7.0 - 18 MG/DL  
 Creatinine 0.99 0.6 - 1.3 MG/DL  
 BUN/Creatinine ratio 18 12 - 20 GFR est AA >60 >60 ml/min/1.73m2 GFR est non-AA >60 >60 ml/min/1.73m2 Calcium 9.0 8.5 - 10.1 MG/DL  
CARDIAC PANEL,(CK, CKMB & TROPONIN) Collection Time: 04/14/19  3:44 PM  
Result Value Ref Range  39 - 308 U/L  
 CK - MB 6.6 (H) <3.6 ng/ml CK-MB Index 2.8 0.0 - 4.0 % Troponin-I, QT <0.02 0.0 - 0.045 NG/ML  
HEPATIC FUNCTION PANEL Collection Time: 04/14/19  3:44 PM  
Result Value Ref Range Protein, total 6.5 6.4 - 8.2 g/dL Albumin 3.8 3.4 - 5.0 g/dL Globulin 2.7 2.0 - 4.0 g/dL A-G Ratio 1.4 0.8 - 1.7 Bilirubin, total 1.7 (H) 0.2 - 1.0 MG/DL Bilirubin, direct 0.4 (H) 0.0 - 0.2 MG/DL Alk. phosphatase 56 45 - 117 U/L  
 AST (SGOT) 32 15 - 37 U/L  
 ALT (SGPT) 32 16 - 61 U/L  
LIPASE Collection Time: 04/14/19  3:44 PM  
Result Value Ref Range Lipase 56 (L) 73 - 393 U/L  
EKG, 12 LEAD, INITIAL Collection Time: 04/14/19  3:45 PM  
Result Value Ref Range Ventricular Rate 107 BPM  
 Atrial Rate 107 BPM  
 P-R Interval 142 ms QRS Duration 84 ms Q-T Interval 340 ms QTC Calculation (Bezet) 453 ms Calculated P Axis 80 degrees Calculated R Axis 13 degrees Calculated T Axis 70 degrees Diagnosis Sinus tachycardia with premature atrial complexes and premature ventricular  
complexes or fusion complexes Nonspecific ST abnormality Abnormal ECG No previous ECGs available Radiologist and cardiologist interpretations if available at time of this note: No results found. Portable (A-P view) CXR:  Preliminary review of x-rays by ED Physician. Interpretation of chest X-ray shows, no infiltrates, no pneumothorax, no CHF, no effusion. Medication(s) ordered for patient during this emergency visit encounter: 
Medications  
ondansetron (ZOFRAN) injection 4 mg (has no administration in time range) sodium chloride 0.9 % bolus infusion 1,000 mL (has no administration in time range)  
albuterol-ipratropium (DUO-NEB) 2.5 MG-0.5 MG/3 ML (has no administration in time range)  
albuterol-ipratropium (DUO-NEB) 2.5 MG-0.5 MG/3 ML (has no administration in time range)  
albuterol-ipratropium (DUO-NEB) 2.5 MG-0.5 MG/3 ML (3 mL Nebulization Given 4/14/19 0977) Medical Decision Making I am the first provider for this patient. I reviewed the vital signs, available nursing notes, past medical history, past surgical history, family history and social history. Vital Signs:  Reviewed the patient's vital signs. ED COURSE:   
IMPRESSION AND MEDICAL DECISION MAKING: 
Based upon the patient's presentation with noted HPI and PE, along with the work up done in the emergency department, I believe that the patient is having nausea and vomiting of uncertain etiology. However, I do believe that the patient can be discharged home and follow up outpatient with your primary doctor. Will prescribe zofran for nausea and vomiting. DIAGNOSIS: 
1. Nausea and vomiting 2. Abdominal pain. SPECIFIC PATIENT INSTRUCTIONS FROM THE PHYSICIAN WHO TREATED YOU IN THE ER TODAY: 
1. Return to the emergency department for worsening of symptoms or for any new symptoms which you want evaluated. 2. Zofran for nausea and/or vomiting. 3. Follow up with your doctor in the next 3-4 days for reevaluation. Patient is improved, resting quietly and comfortably. The patient will be discharged home. The patient was reassured that these symptoms do not appear to represent a serious or life threatening condition at this time. Warning signs of worsening condition were discussed and understood by the patient. Based on patient's age, coexisting illness, exam, and the results of this ED evaluation, the decision to treat as an outpatient was made.  Based on the information available at time of discharge, acute pathology requiring immediate intervention was deemed relative unlikely. While it is impossible to completely exclude the possibility of underlying serious disease or worsening of condition, I feel the relative likelihood is extremely low. I discussed this uncertainty with the patient, who understood ED evaluation and treatment and felt comfortable with the outpatient treatment plan. All questions regarding care, test results, and follow up were answered. The patient is stable and appropriate to discharge. They understand that they should return to the emergency department for any new or worsening symptoms. I stressed the importance of follow up for repeat assessment and possibly further evaluation/treatment. Dictation disclaimer:  Please note that this dictation was completed with "[employer] the computer voice recognition software. Quite often unanticipated grammatical, syntax, homophones, and other interpretive errors are inadvertently transcribed by the computer software. Please disregard these errors. Please excuse any errors that have escaped final proofreading. Coding Diagnoses Clinical Impression: 1. Non-intractable vomiting with nausea, unspecified vomiting type 2. Abdominal pain, generalized Disposition Disposition:  Home. MARK Win Board Certified Emergency Physician Provider Attestation: If a scribe was utilized in generation of this patient record, I personally performed the services described in the documentation, reviewed the documentation, as recorded by the scribe in my presence, and it accurately records the patient's history of presenting illness, review of systems, patient physical examination, and procedures performed by me as the attending physician. MARK Win Board Certified Emergency Physician 4/14/2019. 
4:20 PM

## 2019-04-15 LAB
ATRIAL RATE: 107 BPM
CALCULATED P AXIS, ECG09: 80 DEGREES
CALCULATED R AXIS, ECG10: 13 DEGREES
CALCULATED T AXIS, ECG11: 70 DEGREES
DIAGNOSIS, 93000: NORMAL
P-R INTERVAL, ECG05: 142 MS
Q-T INTERVAL, ECG07: 340 MS
QRS DURATION, ECG06: 84 MS
QTC CALCULATION (BEZET), ECG08: 453 MS
VENTRICULAR RATE, ECG03: 107 BPM

## 2019-04-15 NOTE — ED NOTES
I have reviewed discharge instructions with the patient. The patient and caregiver verbalized understanding. \ Patient armband removed and shredded

## 2019-08-17 ENCOUNTER — HOSPITAL ENCOUNTER (OUTPATIENT)
Dept: CT IMAGING | Age: 84
Discharge: HOME | End: 2019-08-17
Attending: INTERNAL MEDICINE
Payer: MEDICARE

## 2019-08-17 DIAGNOSIS — C34.82 MALIGNANT NEOPLASM OF OVERLAPPING SITES OF LEFT LUNG (HCC): ICD-10-CM

## 2019-08-17 PROCEDURE — 71250 CT THORAX DX C-: CPT

## 2024-09-26 NOTE — ED NOTES
Medication records reconciled. New prescriptions filled out by hospitalist. Transportation arranged for transfer to [facility]. Patient discussed with staff at facility.
Paperwork provided to transport.
Negative